# Patient Record
Sex: MALE | Race: WHITE | Employment: OTHER | URBAN - METROPOLITAN AREA
[De-identification: names, ages, dates, MRNs, and addresses within clinical notes are randomized per-mention and may not be internally consistent; named-entity substitution may affect disease eponyms.]

---

## 2018-07-22 RX ORDER — ATENOLOL 50 MG/1
TABLET ORAL
Qty: 90 TABLET | Refills: 11 | Status: SHIPPED | OUTPATIENT
Start: 2018-07-22 | End: 2019-10-12 | Stop reason: SDUPTHER

## 2018-07-22 RX ORDER — LISINOPRIL 20 MG/1
TABLET ORAL
Qty: 90 TABLET | Refills: 11 | Status: SHIPPED | OUTPATIENT
Start: 2018-07-22 | End: 2019-10-12 | Stop reason: SDUPTHER

## 2019-02-08 ENCOUNTER — HOSPITAL ENCOUNTER (OUTPATIENT)
Age: 69
Discharge: HOME | End: 2019-02-08

## 2019-02-08 PROCEDURE — 9900000038 HC CARDIAC REHAB PHASE 3 - MONTHLY

## 2019-10-13 RX ORDER — ATENOLOL 50 MG/1
TABLET ORAL
Qty: 90 TABLET | Refills: 10 | Status: SHIPPED | OUTPATIENT
Start: 2019-10-13 | End: 2020-09-21 | Stop reason: SDUPTHER

## 2019-10-13 RX ORDER — LISINOPRIL 20 MG/1
TABLET ORAL
Qty: 90 TABLET | Refills: 10 | Status: SHIPPED | OUTPATIENT
Start: 2019-10-13 | End: 2020-09-21 | Stop reason: SDUPTHER

## 2019-10-14 ENCOUNTER — OFFICE VISIT (OUTPATIENT)
Dept: INTERNAL MEDICINE CLINIC | Age: 69
End: 2019-10-14
Payer: MEDICARE

## 2019-10-14 VITALS
BODY MASS INDEX: 29.52 KG/M2 | HEART RATE: 78 BPM | WEIGHT: 230 LBS | RESPIRATION RATE: 12 BRPM | DIASTOLIC BLOOD PRESSURE: 80 MMHG | SYSTOLIC BLOOD PRESSURE: 122 MMHG | HEIGHT: 74 IN

## 2019-10-14 DIAGNOSIS — Z13.29 SCREENING FOR THYROID DISORDER: ICD-10-CM

## 2019-10-14 DIAGNOSIS — E78.5 HYPERLIPIDEMIA, UNSPECIFIED HYPERLIPIDEMIA TYPE: ICD-10-CM

## 2019-10-14 DIAGNOSIS — I10 ESSENTIAL HYPERTENSION: ICD-10-CM

## 2019-10-14 DIAGNOSIS — K63.5 POLYP OF COLON, UNSPECIFIED PART OF COLON, UNSPECIFIED TYPE: ICD-10-CM

## 2019-10-14 DIAGNOSIS — R31.9 HEMATURIA, UNSPECIFIED TYPE: Primary | ICD-10-CM

## 2019-10-14 DIAGNOSIS — Z23 NEED FOR VACCINATION WITH 13-POLYVALENT PNEUMOCOCCAL CONJUGATE VACCINE: ICD-10-CM

## 2019-10-14 DIAGNOSIS — Z00.00 MEDICARE ANNUAL WELLNESS VISIT, SUBSEQUENT: Primary | ICD-10-CM

## 2019-10-14 DIAGNOSIS — B19.20 HEPATITIS C VIRUS INFECTION WITHOUT HEPATIC COMA, UNSPECIFIED CHRONICITY: ICD-10-CM

## 2019-10-14 DIAGNOSIS — Z12.5 SPECIAL SCREENING FOR MALIGNANT NEOPLASM OF PROSTATE: ICD-10-CM

## 2019-10-14 LAB
BILIRUBIN, POC: ABNORMAL
BLOOD URINE, POC: ABNORMAL
CLARITY, POC: CLEAR
COLOR, POC: YELLOW
GLUCOSE URINE, POC: ABNORMAL
KETONES, POC: ABNORMAL
LEUKOCYTE EST, POC: ABNORMAL
NITRITE, POC: ABNORMAL
PH, POC: 5
PROTEIN, POC: ABNORMAL
SPECIFIC GRAVITY, POC: 1.01
UROBILINOGEN, POC: ABNORMAL

## 2019-10-14 PROCEDURE — 81002 URINALYSIS NONAUTO W/O SCOPE: CPT | Performed by: INTERNAL MEDICINE

## 2019-10-14 PROCEDURE — 4040F PNEUMOC VAC/ADMIN/RCVD: CPT | Performed by: INTERNAL MEDICINE

## 2019-10-14 PROCEDURE — G0439 PPPS, SUBSEQ VISIT: HCPCS | Performed by: INTERNAL MEDICINE

## 2019-10-14 PROCEDURE — 3017F COLORECTAL CA SCREEN DOC REV: CPT | Performed by: INTERNAL MEDICINE

## 2019-10-14 PROCEDURE — 1123F ACP DISCUSS/DSCN MKR DOCD: CPT | Performed by: INTERNAL MEDICINE

## 2019-10-14 PROCEDURE — G8484 FLU IMMUNIZE NO ADMIN: HCPCS | Performed by: INTERNAL MEDICINE

## 2019-10-14 PROCEDURE — G0009 ADMIN PNEUMOCOCCAL VACCINE: HCPCS | Performed by: INTERNAL MEDICINE

## 2019-10-14 PROCEDURE — 90670 PCV13 VACCINE IM: CPT | Performed by: INTERNAL MEDICINE

## 2019-10-14 PROCEDURE — 93000 ELECTROCARDIOGRAM COMPLETE: CPT | Performed by: INTERNAL MEDICINE

## 2019-10-14 ASSESSMENT — PATIENT HEALTH QUESTIONNAIRE - PHQ9
SUM OF ALL RESPONSES TO PHQ QUESTIONS 1-9: 1
SUM OF ALL RESPONSES TO PHQ QUESTIONS 1-9: 1

## 2019-10-14 ASSESSMENT — LIFESTYLE VARIABLES
HOW OFTEN DURING THE LAST YEAR HAVE YOU FAILED TO DO WHAT WAS NORMALLY EXPECTED FROM YOU BECAUSE OF DRINKING: 0
HOW OFTEN DURING THE LAST YEAR HAVE YOU FOUND THAT YOU WERE NOT ABLE TO STOP DRINKING ONCE YOU HAD STARTED: 0
HOW OFTEN DO YOU HAVE A DRINK CONTAINING ALCOHOL: 4
AUDIT-C TOTAL SCORE: 7
HOW OFTEN DURING THE LAST YEAR HAVE YOU BEEN UNABLE TO REMEMBER WHAT HAPPENED THE NIGHT BEFORE BECAUSE YOU HAD BEEN DRINKING: 0
HAVE YOU OR SOMEONE ELSE BEEN INJURED AS A RESULT OF YOUR DRINKING: 0
HOW MANY STANDARD DRINKS CONTAINING ALCOHOL DO YOU HAVE ON A TYPICAL DAY: 1
HOW OFTEN DO YOU HAVE SIX OR MORE DRINKS ON ONE OCCASION: 2
AUDIT TOTAL SCORE: 7
HOW OFTEN DURING THE LAST YEAR HAVE YOU HAD A FEELING OF GUILT OR REMORSE AFTER DRINKING: 0
HAS A RELATIVE, FRIEND, DOCTOR, OR ANOTHER HEALTH PROFESSIONAL EXPRESSED CONCERN ABOUT YOUR DRINKING OR SUGGESTED YOU CUT DOWN: 0
HOW OFTEN DURING THE LAST YEAR HAVE YOU NEEDED AN ALCOHOLIC DRINK FIRST THING IN THE MORNING TO GET YOURSELF GOING AFTER A NIGHT OF HEAVY DRINKING: 0

## 2019-10-16 LAB — URINE CULTURE, ROUTINE: NORMAL

## 2019-10-29 DIAGNOSIS — R31.9 HEMATURIA, UNSPECIFIED TYPE: Primary | ICD-10-CM

## 2019-10-29 LAB
BILIRUBIN, POC: ABNORMAL
BLOOD URINE, POC: ABNORMAL
CLARITY, POC: CLEAR
COLOR, POC: YELLOW
GLUCOSE URINE, POC: ABNORMAL
KETONES, POC: ABNORMAL
LEUKOCYTE EST, POC: ABNORMAL
NITRITE, POC: ABNORMAL
PH, POC: 6.5
PROTEIN, POC: ABNORMAL
SPECIFIC GRAVITY, POC: 1.01
UROBILINOGEN, POC: ABNORMAL

## 2019-10-29 PROCEDURE — 81002 URINALYSIS NONAUTO W/O SCOPE: CPT | Performed by: INTERNAL MEDICINE

## 2019-11-18 LAB — PROSTATE SPECIFIC ANTIGEN: 0.5 NG/ML

## 2020-01-27 ENCOUNTER — TELEPHONE (OUTPATIENT)
Dept: INTERNAL MEDICINE CLINIC | Age: 70
End: 2020-01-27

## 2020-01-27 NOTE — TELEPHONE ENCOUNTER
Over the weekend he stood up and there was a popping sound in his leg   She noticed broken blood vessels and soreness   Went to urgent care   They said maybe a pulled muscle but to call PCP to get an ultrasound   It is his inside left leg near knee

## 2020-09-21 RX ORDER — LISINOPRIL 20 MG/1
TABLET ORAL
Qty: 90 TABLET | Refills: 10 | Status: SHIPPED | OUTPATIENT
Start: 2020-09-21 | End: 2020-09-28

## 2020-09-21 RX ORDER — ATENOLOL 50 MG/1
TABLET ORAL
Qty: 90 TABLET | Refills: 3 | Status: SHIPPED | OUTPATIENT
Start: 2020-09-21 | End: 2020-09-28 | Stop reason: SDUPTHER

## 2020-09-28 ENCOUNTER — OFFICE VISIT (OUTPATIENT)
Dept: PRIMARY CARE CLINIC | Age: 70
End: 2020-09-28
Payer: MEDICARE

## 2020-09-28 VITALS
SYSTOLIC BLOOD PRESSURE: 126 MMHG | TEMPERATURE: 97.4 F | HEART RATE: 78 BPM | OXYGEN SATURATION: 98 % | HEIGHT: 74 IN | BODY MASS INDEX: 30.56 KG/M2 | WEIGHT: 238.13 LBS | DIASTOLIC BLOOD PRESSURE: 81 MMHG

## 2020-09-28 PROBLEM — F17.210 CIGARETTE NICOTINE DEPENDENCE WITHOUT COMPLICATION: Status: ACTIVE | Noted: 2020-09-28

## 2020-09-28 PROCEDURE — 99406 BEHAV CHNG SMOKING 3-10 MIN: CPT | Performed by: STUDENT IN AN ORGANIZED HEALTH CARE EDUCATION/TRAINING PROGRAM

## 2020-09-28 PROCEDURE — G0438 PPPS, INITIAL VISIT: HCPCS | Performed by: STUDENT IN AN ORGANIZED HEALTH CARE EDUCATION/TRAINING PROGRAM

## 2020-09-28 RX ORDER — ATENOLOL 50 MG/1
TABLET ORAL
Qty: 90 TABLET | Refills: 3 | Status: SHIPPED | OUTPATIENT
Start: 2020-09-28 | End: 2021-09-16

## 2020-09-28 RX ORDER — VARENICLINE TARTRATE 1 MG/1
1 TABLET, FILM COATED ORAL 2 TIMES DAILY
Qty: 60 TABLET | Refills: 3 | Status: SHIPPED | OUTPATIENT
Start: 2020-09-28 | End: 2021-09-28 | Stop reason: ALTCHOICE

## 2020-09-28 RX ORDER — LISINOPRIL 20 MG/1
20 TABLET ORAL DAILY
Qty: 90 TABLET | Refills: 3 | Status: SHIPPED | OUTPATIENT
Start: 2020-09-28 | End: 2021-09-27

## 2020-09-28 RX ORDER — VARENICLINE TARTRATE
KIT
Qty: 42 TABLET | Refills: 0 | Status: SHIPPED | OUTPATIENT
Start: 2020-09-28 | End: 2021-06-23

## 2020-09-28 ASSESSMENT — PATIENT HEALTH QUESTIONNAIRE - PHQ9
10. IF YOU CHECKED OFF ANY PROBLEMS, HOW DIFFICULT HAVE THESE PROBLEMS MADE IT FOR YOU TO DO YOUR WORK, TAKE CARE OF THINGS AT HOME, OR GET ALONG WITH OTHER PEOPLE: 0
8. MOVING OR SPEAKING SO SLOWLY THAT OTHER PEOPLE COULD HAVE NOTICED. OR THE OPPOSITE, BEING SO FIGETY OR RESTLESS THAT YOU HAVE BEEN MOVING AROUND A LOT MORE THAN USUAL: 0
4. FEELING TIRED OR HAVING LITTLE ENERGY: 1
1. LITTLE INTEREST OR PLEASURE IN DOING THINGS: 3
3. TROUBLE FALLING OR STAYING ASLEEP: 3
7. TROUBLE CONCENTRATING ON THINGS, SUCH AS READING THE NEWSPAPER OR WATCHING TELEVISION: 0
5. POOR APPETITE OR OVEREATING: 0
6. FEELING BAD ABOUT YOURSELF - OR THAT YOU ARE A FAILURE OR HAVE LET YOURSELF OR YOUR FAMILY DOWN: 0
SUM OF ALL RESPONSES TO PHQ QUESTIONS 1-9: 7
9. THOUGHTS THAT YOU WOULD BE BETTER OFF DEAD, OR OF HURTING YOURSELF: 0
SUM OF ALL RESPONSES TO PHQ QUESTIONS 1-9: 7

## 2020-09-28 ASSESSMENT — ENCOUNTER SYMPTOMS
COUGH: 0
SHORTNESS OF BREATH: 0
BACK PAIN: 0
BLOOD IN STOOL: 0

## 2020-09-28 NOTE — PROGRESS NOTES
2020    Bessie Resendez (:  1950) is a 72 y.o. male, here for evaluation of the following medical concerns:    HPI    Well Adult Physical: Patient here for a comprehensive physical exam.The patient reports problems - knee pain  Do you take any herbs or supplements that were not prescribed by a doctor? no Are you taking calcium supplements? no Are you taking aspirin daily? no    Sexual activity: single partner, contraception - none   Diet: Unhealthy  Exercise: no regular exercise  Seatbelt use: yes    Review of Systems   Constitutional: Negative for activity change, fatigue and unexpected weight change. HENT: Negative for hearing loss. Eyes: Negative for visual disturbance. Respiratory: Negative for cough and shortness of breath. Cardiovascular: Negative for chest pain and leg swelling. Gastrointestinal: Negative for blood in stool. Endocrine: Negative for polydipsia and polyphagia. Genitourinary: Negative for dysuria and frequency. Musculoskeletal: Positive for arthralgias (Bilateral knee pain; known osteoarthritis. Previous primary care doctor was holding him off as long as possible. ). Negative for back pain and gait problem. Skin: Negative for rash. Allergic/Immunologic: Negative for environmental allergies. Neurological: Negative for dizziness and headaches. Hematological: Does not bruise/bleed easily. Psychiatric/Behavioral: Negative for dysphoric mood and sleep disturbance. The patient is not nervous/anxious. Prior to Visit Medications    Medication Sig Taking? Authorizing Provider   varenicline (CHANTIX STARTING MONTH LEATHA) 0.5 MG X 11 & 1 MG X 42 tablet Take by mouth.  Yes Gretta Payne DO   varenicline (CHANTIX CONTINUING MONTH LEATHA) 1 MG tablet Take 1 tablet by mouth 2 times daily Yes Gretta Payne DO   atenolol (TENORMIN) 50 MG tablet TAKE ONE TABLET BY MOUTH DAILY Yes Gretta Payne, DO   lisinopril (PRINIVIL;ZESTRIL) 20 MG tablet Take 1 tablet by mouth daily Yes Myron Davies, DO        No Known Allergies    Past Medical History:   Diagnosis Date    Hepatitis C     History of meniscal tear     Right knee     Hyperlipidemia     Hypertension     Impaired fasting glucose        Past Surgical History:   Procedure Laterality Date    COLONOSCOPY  Aug., 2014 ( 2017 )    Dr. Aaron Mckeon - tubular adenomas ( 3 )    COLONOSCOPY  *Nov.26, 2019 ( 2024 )    Dr. Dani Yee - tubular adenoma       Social History     Socioeconomic History    Marital status:      Spouse name: Not on file    Number of children: 0    Years of education: Not on file    Highest education level: Not on file   Occupational History    Occupation: Artist    Social Needs    Financial resource strain: Not on file    Food insecurity     Worry: Not on file     Inability: Not on file   [employer] needs     Medical: Not on file     Non-medical: Not on file   Tobacco Use    Smoking status: Current Every Day Smoker     Packs/day: 1.00     Years: 30.00     Pack years: 30.00    Smokeless tobacco: Never Used   Substance and Sexual Activity    Alcohol use: Yes     Alcohol/week: 0.0 standard drinks     Comment: 35 beers per week.      Drug use: Not on file    Sexual activity: Not Currently   Lifestyle    Physical activity     Days per week: Not on file     Minutes per session: Not on file    Stress: Not on file   Relationships    Social connections     Talks on phone: Not on file     Gets together: Not on file     Attends Oriental orthodox service: Not on file     Active member of club or organization: Not on file     Attends meetings of clubs or organizations: Not on file     Relationship status: Not on file    Intimate partner violence     Fear of current or ex partner: Not on file     Emotionally abused: Not on file     Physically abused: Not on file     Forced sexual activity: Not on file   Other Topics Concern    Not on file   Social History Narrative    Living Will:  No                 Family History Problem Relation Age of Onset    Other Father 80        , pneumonia.  Other Mother         Alive, 81, in good health. Vitals:    20 1020 20 1118   BP: (!) 150/103 126/81   Pulse: 78    Temp: 97.4 °F (36.3 °C)    TempSrc: Oral    SpO2: 98%    Weight: 238 lb 2 oz (108 kg)    Height: 6' 2\" (1.88 m)      Estimated body mass index is 30.57 kg/m² as calculated from the following:    Height as of this encounter: 6' 2\" (1.88 m). Weight as of this encounter: 238 lb 2 oz (108 kg). Physical Exam  Vitals signs reviewed. Constitutional:       Appearance: Normal appearance. HENT:      Head: Normocephalic and atraumatic. Nose: Nose normal.      Mouth/Throat:      Mouth: Mucous membranes are moist.      Pharynx: Oropharynx is clear. Eyes:      Extraocular Movements: Extraocular movements intact. Conjunctiva/sclera: Conjunctivae normal.   Neck:      Musculoskeletal: Normal range of motion and neck supple. Cardiovascular:      Rate and Rhythm: Normal rate and regular rhythm. Pulses: Normal pulses. Heart sounds: Normal heart sounds. Pulmonary:      Effort: Pulmonary effort is normal.      Breath sounds: Normal breath sounds. Abdominal:      General: Abdomen is flat. Bowel sounds are normal.      Palpations: Abdomen is soft. Musculoskeletal: Normal range of motion. Skin:     General: Skin is warm and dry. Capillary Refill: Capillary refill takes less than 2 seconds. Findings: No rash. Neurological:      General: No focal deficit present. Mental Status: He is alert and oriented to person, place, and time. Mental status is at baseline. Sensory: No sensory deficit. Psychiatric:         Mood and Affect: Mood normal.         Behavior: Behavior normal.         Thought Content:  Thought content normal.         Judgment: Judgment normal.       Separate Identifiable issues addressed today:      ASSESSMENT/PLAN:   [facility] was seen today for established new doctor, nicotine dependence and other. Diagnoses and all orders for this visit:    Encounter for medical examination to establish care: Counseled on healthy diet and exercise regimen. Agrees he could improve upon his. Essential hypertension: BP at goal today. Continue current regimen.  -     COMPREHENSIVE METABOLIC PANEL; Future  -     atenolol (TENORMIN) 50 MG tablet; TAKE ONE TABLET BY MOUTH DAILY  -     lisinopril (PRINIVIL;ZESTRIL) 20 MG tablet; Take 1 tablet by mouth daily    Pure hypercholesterolemia: No recent lipid panel. Ordered. -     Lipid, Fasting; Future    Nicotine dependence, cigarettes, uncomplicated: Previously succeeded in quitting with Chantix, but resumed after prescription ran out. Currently smoking 1 pack/day. Interested in stopping. Will start Chantix again today. Greater than 3 minutes spent counseling.  -     CT Lung Screen (Initial or Annual); Future  -     CO TOBACCO USE CESSATION INTERMEDIATE 3-10 MINUTES  -     varenicline (CHANTIX STARTING MONTH PAK) 0.5 MG X 11 & 1 MG X 42 tablet; Take by mouth.  -     varenicline (CHANTIX CONTINUING MONTH PAK) 1 MG tablet; Take 1 tablet by mouth 2 times daily    Class 1 obesity due to excess calories with serious comorbidity and body mass index (BMI) of 30.0 to 30.9 in adult: Counseled on appropriate lifestyle modifications. Encounter for screening for malignant neoplasm of lung: Longstanding smoker. Currently asymptomatic. Appropriate for screening CT. Ordered. -     CT Lung Screen (Initial or Annual); Future    Need for immunization against influenza: Declined    Primary osteoarthritis of both knees: Previously diagnosed with osteoarthritis of both knees by his previous PCP. At his recommendation he has been holding off on knee replacement surgery, but he is now ready to have this done. Referral placed.   -     Anaid Ryan MD, Orthopedic Surgery (Sports,Knee), [place]    Return in about 4 weeks (around 10/26/2020) for Annual Wellness. An  electronic signature was used to authenticate this note.     --Logan Schwab, DO on 9/28/2020 at 11:18 AM

## 2020-09-28 NOTE — PROGRESS NOTES
Pt is concerned about his knees and feet hurting him. Wants to possibly have referrals,   Pt is asking about a marijuana card, has complaints about chronic pain.

## 2020-09-28 NOTE — PATIENT INSTRUCTIONS
pressure checked during a routine doctor visit. Your doctor will tell you how often to check your blood pressure based on your age, your blood pressure results, and other factors. · Diabetes. Ask your doctor whether you should have tests for diabetes. · Vision. Experts recommend that you have yearly exams for glaucoma and other age-related eye problems. · Hearing. Tell your doctor if you notice any change in your hearing. You can have tests to find out how well you hear. · Colon cancer tests. Keep having colon cancer tests as your doctor recommends. You can have one of several types of tests. · Heart attack and stroke risk. At least every 4 to 6 years, you should have your risk for heart attack and stroke assessed. Your doctor uses factors such as your age, blood pressure, cholesterol, and whether you smoke or have diabetes to show what your risk for a heart attack or stroke is over the next 10 years. · Osteoporosis. Talk to your doctor about whether you should have a bone density test to find out whether you have thinning bones. Ask your doctor if you need to take a calcium plus vitamin D supplement. You may be able to get enough calcium and vitamin D through your diet. For women  · Pap test and pelvic exam. You may no longer need a Pap test. Talk with your doctor about whether to stop or continue to have Pap tests. · Breast exam and mammogram. Ask how often you should have a mammogram, which is an X-ray of your breasts. A mammogram can spot breast cancer before it can be felt and when it is easiest to treat. · Thyroid disease. Talk to your doctor about whether to have your thyroid checked as part of a regular physical exam. Women have an increased chance of a thyroid problem. For men  · Prostate exam. Talk to your doctor about whether you should have a blood test (called a PSA test) for prostate cancer.  Experts recommend that you discuss the benefits and risks of the test with your doctor before you decide whether to have this test. Some experts say that men ages 79 and older no longer need testing. · Abdominal aortic aneurysm. Ask your doctor whether you should have a test to check for an aneurysm. You may need a test if you ever smoked or if your parent, brother, sister, or child has had an aneurysm. When should you call for help? Watch closely for changes in your health, and be sure to contact your doctor if you have any problems or symptoms that concern you. Where can you learn more? Go to https://[employer].Jule Game. org and sign in to your [employer] account. Enter I827 in the [employer] box to learn more about \"Well Visit, Over 65: Care Instructions. \"     If you do not have an account, please click on the \"Sign Up Now\" link. Current as of: August 22, 2019               Content Version: 12.5  © 7798-9120 Healthwise, Incorporated. Care instructions adapted under license by [facility] [address] If you have questions about a medical condition or this instruction, always ask your healthcare professional. [address] any warranty or liability for your use of this information.

## 2020-09-29 ENCOUNTER — TELEPHONE (OUTPATIENT)
Dept: PRIMARY CARE CLINIC | Age: 70
End: 2020-09-29

## 2020-09-30 NOTE — TELEPHONE ENCOUNTER
PA submitted via CMM for Chantix Starting Month Devendra 0.5 MG X 11 &1 MG X 42 tablets.   Alex CHOWDHURY Case ID: [id number] - Rx #: [id number]    STATUS: PENDING

## 2020-10-01 NOTE — TELEPHONE ENCOUNTER
Chantix Starting Month Devendra 0.5 MG X 11 &1 MG X 42 tablets  Approved on September 30   Your request has been approved

## 2021-06-14 PROBLEM — E66.09 CLASS 1 OBESITY DUE TO EXCESS CALORIES WITH SERIOUS COMORBIDITY AND BODY MASS INDEX (BMI) OF 30.0 TO 30.9 IN ADULT: Status: ACTIVE | Noted: 2021-06-14

## 2021-06-14 PROBLEM — E66.811 CLASS 1 OBESITY DUE TO EXCESS CALORIES WITH SERIOUS COMORBIDITY AND BODY MASS INDEX (BMI) OF 30.0 TO 30.9 IN ADULT: Status: ACTIVE | Noted: 2021-06-14

## 2021-06-14 NOTE — PATIENT INSTRUCTIONS
Patient Education        Learning About the Mediterranean Diet  What is the [address]? The Mediterranean diet is a style of eating rather than a diet plan. It features foods eaten in [place], [place], [place] and [place], and other countries along the [facility]. It emphasizes eating foods like fish, fruits, vegetables, beans, high-fiber breads and whole grains, nuts, and olive oil. This style of eating includes limited red meat, cheese, and sweets. Why choose the Mediterranean diet? A Mediterranean-style diet may improve heart health. It contains more fat than other heart-healthy diets. But the fats are mainly from nuts, unsaturated oils (such as fish oils and olive oil), and certain nut or seed oils (such as canola, soybean, or flaxseed oil). These fats may help protect the heart and blood vessels. How can you get started on the Mediterranean diet? Here are some things you can do to switch to a more Mediterranean way of eating. What to eat  · Eat a variety of fruits and vegetables each day, such as grapes, blueberries, tomatoes, broccoli, peppers, figs, olives, spinach, eggplant, beans, lentils, and chickpeas. · Eat a variety of whole-grain foods each day, such as oats, brown rice, and whole wheat bread, pasta, and couscous. · Eat fish at least 2 times a week. Try tuna, salmon, mackerel, lake trout, herring, or sardines. · Eat moderate amounts of low-fat dairy products, such as milk, cheese, or yogurt. · Eat moderate amounts of poultry and eggs. · Choose healthy (unsaturated) fats, such as nuts, olive oil, and certain nut or seed oils like canola, soybean, and flaxseed. · Limit unhealthy (saturated) fats, such as butter, palm oil, and coconut oil. And limit fats found in animal products, such as meat and dairy products made with whole milk. Try to eat red meat only a few times a month in very small amounts. · Limit sweets and desserts to only a few times a week.  This includes sugar-sweetened drinks like soda. The Mediterranean diet may also include red wine with your meal1 glass each day for women and up to 2 glasses a day for men. Tips for eating at home  · Use herbs, spices, garlic, lemon zest, and citrus juice instead of salt to add flavor to foods. · Add avocado slices to your sandwich instead of kelley. · Have fish for lunch or dinner instead of red meat. Brush the fish with olive oil, and broil or grill it. · Sprinkle your salad with seeds or nuts instead of cheese. · Cook with olive or canola oil instead of butter or oils that are high in saturated fat. · Switch from 2% milk or whole milk to 1% or fat-free milk. · Dip raw vegetables in a vinaigrette dressing or hummus instead of dips made from mayonnaise or sour cream.  · Have a piece of fruit for dessert instead of a piece of cake. Try baked apples, or have some dried fruit. Tips for eating out  · Try broiled, grilled, baked, or poached fish instead of having it fried or breaded. · Ask your  to have your meals prepared with olive oil instead of butter. · Order dishes made with marinara sauce or sauces made from olive oil. Avoid sauces made from cream or mayonnaise. · Choose whole-grain breads, whole wheat pasta and pizza crust, brown rice, beans, and lentils. · Cut back on butter or margarine on bread. Instead, you can dip your bread in a small amount of olive oil. · Ask for a side salad or grilled vegetables instead of french fries or chips. Where can you learn more? Go to https://[employer] Companyguilleeweb.[employer]aks. org and sign in to your [employer] account. Enter [phone] in the [facility] box to learn more about \"Learning About the Mediterranean Diet. \"     If you do not have an account, please click on the \"Sign Up Now\" link. Current as of: December 17, 2020               Content Version: 12.8  © 1424-6514 Healthwise, Incorporated. Care instructions adapted under license by [facility] ([facility]).  If you have questions about a medical condition or this instruction, always ask your healthcare professional. [place] [address] any warranty or liability for your use of this information.

## 2021-06-15 ENCOUNTER — OFFICE VISIT (OUTPATIENT)
Dept: PRIMARY CARE CLINIC | Age: 71
End: 2021-06-15
Payer: MEDICARE

## 2021-06-15 VITALS
HEIGHT: 74 IN | WEIGHT: 236.8 LBS | BODY MASS INDEX: 30.39 KG/M2 | HEART RATE: 70 BPM | SYSTOLIC BLOOD PRESSURE: 123 MMHG | DIASTOLIC BLOOD PRESSURE: 83 MMHG

## 2021-06-15 DIAGNOSIS — E66.09 CLASS 1 OBESITY DUE TO EXCESS CALORIES WITH SERIOUS COMORBIDITY AND BODY MASS INDEX (BMI) OF 30.0 TO 30.9 IN ADULT: ICD-10-CM

## 2021-06-15 DIAGNOSIS — R79.9 ABNORMAL FINDING OF BLOOD CHEMISTRY, UNSPECIFIED: ICD-10-CM

## 2021-06-15 DIAGNOSIS — E78.00 PURE HYPERCHOLESTEROLEMIA: ICD-10-CM

## 2021-06-15 DIAGNOSIS — Z12.5 SCREENING PSA (PROSTATE SPECIFIC ANTIGEN): ICD-10-CM

## 2021-06-15 DIAGNOSIS — M67.449 GANGLION CYST OF JOINT OF FINGER: ICD-10-CM

## 2021-06-15 DIAGNOSIS — F17.210 CIGARETTE NICOTINE DEPENDENCE WITHOUT COMPLICATION: ICD-10-CM

## 2021-06-15 DIAGNOSIS — N40.0 BENIGN PROSTATIC HYPERPLASIA WITHOUT LOWER URINARY TRACT SYMPTOMS: ICD-10-CM

## 2021-06-15 DIAGNOSIS — I10 ESSENTIAL HYPERTENSION: Primary | ICD-10-CM

## 2021-06-15 DIAGNOSIS — Z13.1 SCREENING FOR DIABETES MELLITUS: ICD-10-CM

## 2021-06-15 PROCEDURE — G8417 CALC BMI ABV UP PARAM F/U: HCPCS | Performed by: STUDENT IN AN ORGANIZED HEALTH CARE EDUCATION/TRAINING PROGRAM

## 2021-06-15 PROCEDURE — 4040F PNEUMOC VAC/ADMIN/RCVD: CPT | Performed by: STUDENT IN AN ORGANIZED HEALTH CARE EDUCATION/TRAINING PROGRAM

## 2021-06-15 PROCEDURE — 4004F PT TOBACCO SCREEN RCVD TLK: CPT | Performed by: STUDENT IN AN ORGANIZED HEALTH CARE EDUCATION/TRAINING PROGRAM

## 2021-06-15 PROCEDURE — 1123F ACP DISCUSS/DSCN MKR DOCD: CPT | Performed by: STUDENT IN AN ORGANIZED HEALTH CARE EDUCATION/TRAINING PROGRAM

## 2021-06-15 PROCEDURE — 99214 OFFICE O/P EST MOD 30 MIN: CPT | Performed by: STUDENT IN AN ORGANIZED HEALTH CARE EDUCATION/TRAINING PROGRAM

## 2021-06-15 PROCEDURE — 3017F COLORECTAL CA SCREEN DOC REV: CPT | Performed by: STUDENT IN AN ORGANIZED HEALTH CARE EDUCATION/TRAINING PROGRAM

## 2021-06-15 PROCEDURE — G8427 DOCREV CUR MEDS BY ELIG CLIN: HCPCS | Performed by: STUDENT IN AN ORGANIZED HEALTH CARE EDUCATION/TRAINING PROGRAM

## 2021-06-15 SDOH — ECONOMIC STABILITY: FOOD INSECURITY: WITHIN THE PAST 12 MONTHS, THE FOOD YOU BOUGHT JUST DIDN'T LAST AND YOU DIDN'T HAVE MONEY TO GET MORE.: NEVER TRUE

## 2021-06-15 SDOH — ECONOMIC STABILITY: FOOD INSECURITY: WITHIN THE PAST 12 MONTHS, YOU WORRIED THAT YOUR FOOD WOULD RUN OUT BEFORE YOU GOT MONEY TO BUY MORE.: NEVER TRUE

## 2021-06-15 ASSESSMENT — ENCOUNTER SYMPTOMS
NAUSEA: 0
CHEST TIGHTNESS: 0
ABDOMINAL PAIN: 0
SHORTNESS OF BREATH: 0
ABDOMINAL DISTENTION: 0
DIARRHEA: 0
COUGH: 0

## 2021-06-15 ASSESSMENT — PATIENT HEALTH QUESTIONNAIRE - PHQ9
2. FEELING DOWN, DEPRESSED OR HOPELESS: 0
SUM OF ALL RESPONSES TO PHQ QUESTIONS 1-9: 1
SUM OF ALL RESPONSES TO PHQ QUESTIONS 1-9: 1
SUM OF ALL RESPONSES TO PHQ9 QUESTIONS 1 & 2: 1
SUM OF ALL RESPONSES TO PHQ QUESTIONS 1-9: 1
1. LITTLE INTEREST OR PLEASURE IN DOING THINGS: 1

## 2021-06-15 ASSESSMENT — SOCIAL DETERMINANTS OF HEALTH (SDOH): HOW HARD IS IT FOR YOU TO PAY FOR THE VERY BASICS LIKE FOOD, HOUSING, MEDICAL CARE, AND HEATING?: NOT HARD AT ALL

## 2021-06-15 NOTE — PROGRESS NOTES
6/15/2021     Cynthia Garcia (:  1950) is a 72 y.o. male, here for evaluation of the following medical concerns:    HPI  Treatment Adherence:   Medication compliance:  compliant most of the time  Diet compliance:  compliant most of the time  Weight trend: stable  Current exercise: walks 3 time(s) per week  Barriers: none    Hypertension:  Home blood pressure monitoring: No. Patient denies chest pain, shortness of breath, headache, lightheadedness, blurred vision, peripheral edema, palpitations, dry cough and fatigue. Antihypertensive medication side effects: no medication side effects noted. Use of agents associated with hypertension: none. Sodium (mmol/L)   Date Value   2014 144    BUN (mg/dL)   Date Value   2014 13    Glucose (mg/dL)   Date Value   2014 95      Potassium (mmol/L)   Date Value   2014 4.2    CREATININE (mg/dL)   Date Value   2014 0.7 (L)         Hyperlipidemia:  No new myalgias or GI upset on atorvastatin (Lipitor). Lab Results   Component Value Date    CHOL 229 (H) 2014    TRIG 126 2014    HDL 36 (L) 2014    LDLCALC 168 (H) 2014     Lab Results   Component Value Date    ALT 62 (H) 2014    AST 73 (H) 2014        Mass left finger: Cynthia Najera presents today for evaluation of a mass of one of his left fingers. He noticed the mass several years ago, but it has been slowly increasing in size. It does not cause him much discomfort unless he strikes it on something. Review of Systems   Constitutional: Negative for activity change, appetite change, fatigue and unexpected weight change. Eyes: Negative for visual disturbance. Respiratory: Negative for cough, chest tightness and shortness of breath. Cardiovascular: Negative for chest pain, palpitations and leg swelling. Gastrointestinal: Negative for abdominal distention, abdominal pain, diarrhea and nausea.    Endocrine: Rate and Rhythm: Normal rate and regular rhythm. Pulses: Normal pulses. Heart sounds: Normal heart sounds. Pulmonary:      Effort: Pulmonary effort is normal.      Breath sounds: Normal breath sounds. Abdominal:      General: Abdomen is flat. Bowel sounds are normal.      Palpations: Abdomen is soft. Musculoskeletal:         General: Deformity (Ganglion cyst of PIP joint) present. Normal range of motion. Cervical back: Normal range of motion and neck supple. Skin:     General: Skin is warm and dry. Capillary Refill: Capillary refill takes less than 2 seconds. Findings: No rash. Neurological:      General: No focal deficit present. Mental Status: He is alert and oriented to person, place, and time. Mental status is at baseline. Psychiatric:         Mood and Affect: Mood normal.         ASSESSMENT/PLAN:  1. Essential hypertension: Blood pressure at goal today. Updating CMP. Tolerating current regimen well without side effects. Refills given on medications. - Comprehensive Metabolic Panel; Future    2. Pure hypercholesterolemia: Most recent lipid panel July 2014. . Updating today. - Lipid, Fasting; Future    3. Ganglion cyst of joint of finger: Irritating ganglion cyst of left PIP joint. Referral to Dr. Janey Cherry for evaluation.  - Ignacio Mckee MD, Hand Surgery (Hand, Wrist, Upper Extremity), [facility]    4. Cigarette nicotine dependence without complication: Chantix too expensive. Patient continues to try to wean cigarettes on his own. - CT Lung Screen (Initial or Annual); Future    5. Class 1 obesity due to excess calories with serious comorbidity and body mass index (BMI) of 30.0 to 68.1 in adult  Complicates all aspects of patient's care. 6. Screening for diabetes mellitus  - Hemoglobin A1C; Future    7. Screening PSA (prostate specific antigen)  - PSA, Prostatic Specific Antigen;  Future    Return in about 6 months (around 12/15/2021) for Annual Wellness. An electronic signature was used to authenticate this note.     --Jaycob Whelan,  on 6/15/2021 at 2:06 PM

## 2021-06-22 DIAGNOSIS — F17.210 NICOTINE DEPENDENCE, CIGARETTES, UNCOMPLICATED: ICD-10-CM

## 2021-06-22 NOTE — TELEPHONE ENCOUNTER
Medication:   Requested Prescriptions     Pending Prescriptions Disp Refills    varenicline (CHANTIX STARTING MONTH LEATHA) 0.5 MG X 11 & 1 MG X 42 tablet [Pharmacy Med Name: [address] 1 box 0     Sig: TAKE 1 (0.5MG) TAB BY MOUTH DAILY FOR 3 DAYS, THEN TAKE 1 (0.5MG) TAB TWICE DAILY FOR 4 DAYS, THEN TAKE 1 (1MG) TAB TWICE DAILY THEREAFTER        Last Filled:  09/28/2020    Patient Phone Number: [phone] (home)     Last appt: 6/15/2021 Return in about 6 months (around 12/15/2021) for Annual Wellness  Next appt: Visit date not found    Last OARRS: No flowsheet data found.

## 2021-06-23 RX ORDER — VARENICLINE TARTRATE
KIT
Qty: 1 BOX | Refills: 0 | Status: SHIPPED | OUTPATIENT
Start: 2021-06-23 | End: 2021-09-28 | Stop reason: ALTCHOICE

## 2021-06-24 ENCOUNTER — TELEPHONE (OUTPATIENT)
Dept: PRIMARY CARE CLINIC | Age: 71
End: 2021-06-24

## 2021-06-24 NOTE — TELEPHONE ENCOUNTER
PA submitted via CMM for Chantix Starting Month Devendra 0.5 MG X 11 &1 MG X 42 tablets.   Key: [id number] - PA Case ID: [id number]    STATUS: PENDING

## 2021-06-24 NOTE — TELEPHONE ENCOUNTER
varenicline (CHANTIX STARTING MONTH PAK) 0.5 MG X 11 & 1 MG X 42 tablet [[id number]]   Order Details   Dose, Route, Frequency: As Directed   Dispense Quantity: 1 box Refills: 0         Sig: TAKE 1 (0.5MG) TAB BY MOUTH DAILY FOR 3 DAYS, THEN TAKE 1 (0.5MG) TAB TWICE DAILY FOR 4 DAYS, THEN TAKE 1 (1MG) TAB TWICE DAILY THEREAFTER        Start Date: 06/23/21 End Date: --   Written Date: 06/23/21 Expiration Date: 06/23/22      Diagnosis Association: Nicotine dependence, cigarettes, uncomplicated    Original Order: varenicline (CHANTIX STARTING MONTH LEATHA) 0.5 MG X 11 & 1 MG X 42 tablet [[id number]]   Providers  Authorizing Provider: Maribeth Macias DO NPI: [id number]   Ordering User: Maribeth Macias DO        Pharmacy  [address], [address] [place] [id number] [facility] [phone]   [place] [id number]   Phone: [phone] Fax: [phone]

## 2021-06-25 NOTE — TELEPHONE ENCOUNTER
Received APPROVAL for Chantix. Medication has been approved through 12/21/2021. See approval letter attached. Please notify patient. Thank you.

## 2021-07-06 ENCOUNTER — TELEPHONE (OUTPATIENT)
Dept: PRIMARY CARE CLINIC | Age: 71
End: 2021-07-06

## 2021-07-06 DIAGNOSIS — G89.29 CHRONIC LOW BACK PAIN, UNSPECIFIED BACK PAIN LATERALITY, UNSPECIFIED WHETHER SCIATICA PRESENT: Primary | ICD-10-CM

## 2021-07-06 DIAGNOSIS — M54.50 CHRONIC LOW BACK PAIN, UNSPECIFIED BACK PAIN LATERALITY, UNSPECIFIED WHETHER SCIATICA PRESENT: Primary | ICD-10-CM

## 2021-07-06 NOTE — TELEPHONE ENCOUNTER
----- Message from Jenifer Cruz sent at 7/2/2021  3:41 PM EDT -----  Subject: Message to Provider    QUESTIONS  Information for Provider? Pt says Dr. Maria T Krishnamurthy mentioned a pain management   clinic in [facility] and he would like Dr. Maria T Krishnamurthy to give him a call or text   to get the number and address. ---------------------------------------------------------------------------  --------------  [place] READ  What is the best way for the office to contact you? OK to leave message on   voicemail  Preferred Call Back Phone Number? [phone]  ---------------------------------------------------------------------------  --------------  SCRIPT ANSWERS  Relationship to Patient?  Self

## 2021-07-06 NOTE — TELEPHONE ENCOUNTER
Can we call and clarify what he wants the referral for and I can place it. I just cannot remember which body part he has chronic pain.

## 2021-09-16 DIAGNOSIS — I10 ESSENTIAL HYPERTENSION: ICD-10-CM

## 2021-09-16 RX ORDER — ATENOLOL 100 MG/1
100 TABLET ORAL DAILY
Qty: 90 TABLET | Refills: 1 | Status: SHIPPED | OUTPATIENT
Start: 2021-09-16 | End: 2021-09-21

## 2021-09-16 NOTE — TELEPHONE ENCOUNTER
Medication:   Requested Prescriptions     Pending Prescriptions Disp Refills    atenolol (TENORMIN) 100 MG tablet [Pharmacy Med Name: ATENOLOL 100 MG TABLET] 45 tablet      Sig: TAKE 1/2 TABLET BY MOUTH DAILY        Last Filled:  09/28/2020    Patient Phone Number: [phone] (home)     Last appt: 6/15/2021 Return in about 6 months (around 12/15/2021) for Annual Wellness    Next appt: Visit date not found    Last OARRS: No flowsheet data found.

## 2021-09-21 DIAGNOSIS — I10 ESSENTIAL HYPERTENSION: ICD-10-CM

## 2021-09-21 RX ORDER — ATENOLOL 100 MG/1
TABLET ORAL
Qty: 45 TABLET | Refills: 1 | Status: SHIPPED | OUTPATIENT
Start: 2021-09-21 | End: 2021-09-28 | Stop reason: ALTCHOICE

## 2021-09-21 NOTE — TELEPHONE ENCOUNTER
Medication:   Requested Prescriptions     Pending Prescriptions Disp Refills    atenolol (TENORMIN) 100 MG tablet [Pharmacy Med Name: ATENOLOL 100 MG TABLET] 45 tablet      Sig: TAKE 1/2 TABLET BY MOUTH DAILY        Last Filled:  09/16/21    Patient Phone Number: [phone] (home)     Last appt: 6/15/2021 Return in about 6 months (around 12/15/2021) for Annual Wellness. Next appt: Visit date not found    Last OARRS: No flowsheet data found.

## 2021-09-24 DIAGNOSIS — I10 ESSENTIAL HYPERTENSION: ICD-10-CM

## 2021-09-27 RX ORDER — LISINOPRIL 20 MG/1
TABLET ORAL
Qty: 90 TABLET | Refills: 3 | Status: SHIPPED | OUTPATIENT
Start: 2021-09-27 | End: 2022-09-26

## 2021-09-28 ENCOUNTER — OFFICE VISIT (OUTPATIENT)
Dept: INTERNAL MEDICINE CLINIC | Age: 71
End: 2021-09-28
Payer: MEDICARE

## 2021-09-28 VITALS
BODY MASS INDEX: 29.26 KG/M2 | DIASTOLIC BLOOD PRESSURE: 80 MMHG | TEMPERATURE: 98 F | SYSTOLIC BLOOD PRESSURE: 140 MMHG | RESPIRATION RATE: 14 BRPM | HEIGHT: 74 IN | HEART RATE: 91 BPM | WEIGHT: 228 LBS | OXYGEN SATURATION: 98 %

## 2021-09-28 DIAGNOSIS — B18.2 CHRONIC HEPATITIS C WITHOUT HEPATIC COMA (HCC): ICD-10-CM

## 2021-09-28 DIAGNOSIS — F17.210 CIGARETTE NICOTINE DEPENDENCE WITHOUT COMPLICATION: ICD-10-CM

## 2021-09-28 DIAGNOSIS — E78.00 PURE HYPERCHOLESTEROLEMIA: ICD-10-CM

## 2021-09-28 DIAGNOSIS — E66.09 CLASS 1 OBESITY DUE TO EXCESS CALORIES WITH SERIOUS COMORBIDITY AND BODY MASS INDEX (BMI) OF 30.0 TO 30.9 IN ADULT: ICD-10-CM

## 2021-09-28 DIAGNOSIS — I10 ESSENTIAL HYPERTENSION: Primary | ICD-10-CM

## 2021-09-28 DIAGNOSIS — N40.1 BENIGN PROSTATIC HYPERPLASIA (BPH) WITH URINARY URGENCY: ICD-10-CM

## 2021-09-28 DIAGNOSIS — R39.15 BENIGN PROSTATIC HYPERPLASIA (BPH) WITH URINARY URGENCY: ICD-10-CM

## 2021-09-28 DIAGNOSIS — T14.8XXA BRUISING: ICD-10-CM

## 2021-09-28 PROCEDURE — G0439 PPPS, SUBSEQ VISIT: HCPCS | Performed by: INTERNAL MEDICINE

## 2021-09-28 RX ORDER — ATENOLOL 100 MG/1
100 TABLET ORAL DAILY
COMMUNITY
End: 2022-06-27

## 2021-09-28 ASSESSMENT — PATIENT HEALTH QUESTIONNAIRE - PHQ9
SUM OF ALL RESPONSES TO PHQ QUESTIONS 1-9: 0
2. FEELING DOWN, DEPRESSED OR HOPELESS: 0
SUM OF ALL RESPONSES TO PHQ QUESTIONS 1-9: 0
SUM OF ALL RESPONSES TO PHQ QUESTIONS 1-9: 0
1. LITTLE INTEREST OR PLEASURE IN DOING THINGS: 0
SUM OF ALL RESPONSES TO PHQ9 QUESTIONS 1 & 2: 0

## 2021-09-28 ASSESSMENT — ENCOUNTER SYMPTOMS
SINUS PRESSURE: 0
RHINORRHEA: 0
CHEST TIGHTNESS: 0
ROS SKIN COMMENTS: BRUISING
WHEEZING: 0
SINUS PAIN: 0
NAUSEA: 0
VOMITING: 0
EYE DISCHARGE: 0
SORE THROAT: 0
COUGH: 0
TROUBLE SWALLOWING: 0
SHORTNESS OF BREATH: 0
EYE PAIN: 0
ABDOMINAL PAIN: 0
BLOOD IN STOOL: 0

## 2021-09-28 NOTE — PROGRESS NOTES
2021    Gabby Lerner (: 1950) is a 80 y.o. male, here for evaluation of the following medical concerns:    Chief Complaint   Patient presents with    Establish Care       Hypertension    Watching low-sodium diet. Lisinopril not taken today thus her blood pressure is high usually is normal  Taking blood pressure medications regularly. Blood pressure checked off and on and trying to keep a goal of blood pressure less than 130/85 most of the time. Denies any chest pain / palpitation / shortness of breath / lightheadedness etc.   The available labs reviewed and analyzed and independent interpretation of the results explained at length. Lab Results       Component                Value               Date                       NA                       144                 2014                 K                        4.2                 2014                 CL                       102                 2014                 CO2                      26                  2014                 BUN                      13                  2014                 CREATININE               0.7                 2014                 GLUCOSE                  95                  2014                 CALCIUM                  9.4                 2014                Hyperlipidemia    he has not been watching low fat diet. As he says nobody else told him he had high cholesterol so he has not been following any diet. Reminded to keep doing regular exercise 3 to 4 days a week. Must keep trying to lose weight. The available labs reviewed and analyzed and independent interpretation of the results explained at length.   Lab Results       Component                Value               Date                       CHOL                     229 (H)             2014                 TRIG                     126                 2014                 HDL 36 (L)              07/01/2014                 ALT                      62 (H)              07/01/2014                 AST                      73 (H)              07/01/2014           He had colonoscopy in 2019 and had polyps    He had taken medication for hepatitis C in the past.   But he has not kept up with the liver specialist follow-up so he does need to see him again. Bruising forearms we need to check CBC PT PTT. BPH with symptoms. Smoking    The [address] for Disease Control and Prevention states:    Tobacco use harms every organ of the body which leads to disease and disability. Tobacco use causes cancer, heart disease, stroke, and lung diseases (including emphysema, bronchitis, and chronic airway obstruction). strongly encouraged  to quit using tobacco.   You can decrease your cigarette use by half every 1-2 weeks to quit smoking in 4-8 weeks or so. You can use off and on nicotine gum or lozenges to help quit smoking. Review of Systems   Constitutional: Negative for appetite change, chills, fever and unexpected weight change. HENT: Negative for congestion, ear discharge, ear pain, nosebleeds, rhinorrhea, sinus pressure, sinus pain, sore throat and trouble swallowing. Eyes: Negative for pain and discharge. Respiratory: Negative for cough, chest tightness, shortness of breath and wheezing. Cardiovascular: Negative for chest pain, palpitations and leg swelling. Gastrointestinal: Negative for abdominal pain, blood in stool, nausea and vomiting. Endocrine: Negative for polydipsia and polyphagia. Genitourinary: Negative for difficulty urinating, enuresis, flank pain and hematuria. Musculoskeletal: Negative for myalgias. Skin: Negative for rash. Bruising   Neurological: Negative for facial asymmetry, weakness, light-headedness, numbness and headaches. Psychiatric/Behavioral: Negative for confusion.        Current Outpatient Medications on File Prior to Visit   Medication Sig Dispense Refill    atenolol (TENORMIN) 100 MG tablet Take 100 mg by mouth daily      lisinopril (PRINIVIL;ZESTRIL) 20 MG tablet TAKE ONE TABLET BY MOUTH DAILY 90 tablet 3     No current facility-administered medications on file prior to visit. No Known Allergies  Past Medical History:   Diagnosis Date    Hepatitis C     History of meniscal tear     Right knee     Hyperlipidemia     Hypertension     Impaired fasting glucose      Past Surgical History:   Procedure Laterality Date    COLONOSCOPY  Aug., 2014 (  )    Dr. Vyas Hams - tubular adenomas ( 3 )    COLONOSCOPY  *2019 (  )    Dr. Elizondo Shown - tubular adenoma      Social History     Tobacco Use    Smoking status: Current Every Day Smoker     Packs/day: 1.00     Years: 30.00     Pack years: 30.00    Smokeless tobacco: Never Used   Substance Use Topics    Alcohol use: Yes     Alcohol/week: 0.0 standard drinks     Comment: 35 beers per week. Family History   Problem Relation Age of Onset    Other Father 80        , pneumonia.  Other Mother         Alive, 80, in good health. Vitals:    21 1331 21 1344   BP: (!) 144/90 (!) 140/80   Site: Left Upper Arm Left Upper Arm   Position: Sitting Sitting   Cuff Size: Large Adult Large Adult   Pulse: 91    Resp: 14    Temp: 98 °F (36.7 °C)    TempSrc: Temporal    SpO2: 98%    Weight: 228 lb (103.4 kg)    Height: 6' 2\" (1.88 m)      Estimated body mass index is 29.27 kg/m² as calculated from the following:    Height as of this encounter: 6' 2\" (1.88 m). Weight as of this encounter: 228 lb (103.4 kg). Physical Exam  Vitals and nursing note reviewed. Constitutional:       General: He is not in acute distress. HENT:      Head: Normocephalic and atraumatic.       Right Ear: Tympanic membrane, ear canal and external ear normal.      Left Ear: Tympanic membrane, ear canal and external ear normal.      Nose: Nose normal.      Mouth/Throat: Mouth: Mucous membranes are moist.      Pharynx: No posterior oropharyngeal erythema. Eyes:      General: Lids are normal.      Extraocular Movements: Extraocular movements intact. Conjunctiva/sclera: Conjunctivae normal.      Pupils: Pupils are equal, round, and reactive to light. Neck:      Thyroid: No thyromegaly. Vascular: No JVD. Trachea: No tracheal deviation. Cardiovascular:      Rate and Rhythm: Normal rate and regular rhythm. Heart sounds: Normal heart sounds. No gallop. Pulmonary:      Effort: Pulmonary effort is normal. No respiratory distress. Breath sounds: Normal breath sounds. No wheezing or rales. Abdominal:      General: Bowel sounds are normal.      Palpations: Abdomen is soft. There is no mass. Tenderness: There is no abdominal tenderness. Musculoskeletal:         General: No tenderness. Cervical back: Neck supple. Comments: No leg edema or calf tenderness   Lymphadenopathy:      Cervical: No cervical adenopathy. Skin:     General: Skin is warm and dry. Findings: Bruising present. No rash. Neurological:      General: No focal deficit present. Mental Status: He is alert and oriented to person, place, and time. Cranial Nerves: No cranial nerve deficit. Sensory: No sensory deficit. Psychiatric:         Mood and Affect: Mood normal.         Behavior: Behavior normal.         Thought Content: Thought content normal.         Judgment: Judgment normal.         ASSESSMENT/PLAN:  1. Essential hypertension    - Comprehensive Metabolic Panel; Future    2. Pure hypercholesterolemia    - Lipid Panel; Future    3. Chronic hepatitis C without hepatic coma (HCC)    - Pierre Hernandez MD, Gastroenterology, [facility]    4. Class 1 obesity due to excess calories with serious comorbidity and body mass index (BMI) of 30.0 to 30.9 in adult  Lose weight    5.  Benign prostatic hyperplasia (BPH) with urinary urgency    - PSA, Prostatic Specific Antigen; Future  - Urinalysis Reflex to Culture; Future    6. Cigarette nicotine dependence without complication    - CT Lung Screen (Initial or Annual); Future    7. Bruising    - CBC Auto Differential; Future  - Protime-INR; Future  - APTT; Future      Return in about 4 weeks (around 10/26/2021) for annual wellness visit. Patient Instructions   Fasting blood work tomorrow here. Do CT scan of chest [facility]    Hypertension    Extensive counseling done to keep low sodium diet and  Avoid potato chips, pretzels, sauerkraut , ham , sausage, kelley , salty crackers , salty french fries, salty nuts, salty popcorn etc.  Use only low sodium soups. No salted canned vegetables. Use fresh or frozen vegetables. No salt shaker use. May use Mrs. Shaw as a salt substitute. Avoid weight gain. Regular exercise program.  Keep taking blood pressure medications regularly. If blood pressure staying above 130/85 or having side effects with blood pressure medications, then bring the blood pressure and pulse recorded twice a day to an appointment sooner than scheduled one. Strongly encouraged to quit using tobacco.   You can decrease your cigarette use by half every 1-2 weeks to quit smoking in 4-8 weeks or so. You can use off and on nicotine gum or lozenges to help quit smoking. The [address] for Disease Control and Prevention states:    Tobacco use harms every organ of the body which leads to disease and disability.  Tobacco use causes cancer, heart disease, stroke, and lung diseases (including emphysema, bronchitis, and chronic airway obstruction). We have many other ways to help you quit using tobacco.     We suggest this website:  www.smokefree. gov   You might also like this cell phone text message program.  Text message charges apply on your cell phone plan. Text the word QUIT to IQUIT to get started.  This program offers free telephone counseling.   Dial 1-800-QUIT-Now    Hyperlipidemia    Explained -- to keep least cheese butter or fried food. Grilled baked or broiled meats. No breaded meats. Keep trying to get to a healthy weight. Keep regular exercise program.  Discussed use, benefit, and side effects of prescribed medications. Barriers to compliance discussed. All patient questions answered. Pt voiced understanding. IF YOU NEED A PRESCRIPTION REFILL, THEN PLEASE GIVE US THREE WORKING DAYS TO REFILL A PRESCRIPTION. Office Hours to Answer Questions--Tuesday thru Friday --9.00 AM to 4.00 PM    Please get all OVER DUE VACCINATIONS done at the pharmacy or health department as soon as possible. Electronically signed by Antonio Rahman MD on 9/28/2021 at 2:51 PM     This dictation was generated by voice recognition computer software. Although all attempts are made to edit the dictation for accuracy, there may be errors in the transcription that are not intended.

## 2021-09-28 NOTE — PATIENT INSTRUCTIONS
Fasting blood work tomorrow here. Do CT scan of chest [facility] [employer]    Hypertension    Extensive counseling done to keep low sodium diet and  Avoid potato chips, pretzels, sauerkraut , ham , sausage, kelley , salty crackers , salty french fries, salty nuts, salty popcorn etc.  Use only low sodium soups. No salted canned vegetables. Use fresh or frozen vegetables. No salt shaker use. May use Mrs. Shaw as a salt substitute. Avoid weight gain. Regular exercise program.  Keep taking blood pressure medications regularly. If blood pressure staying above 130/85 or having side effects with blood pressure medications, then bring the blood pressure and pulse recorded twice a day to an appointment sooner than scheduled one. Strongly encouraged to quit using tobacco.   You can decrease your cigarette use by half every 1-2 weeks to quit smoking in 4-8 weeks or so. You can use off and on nicotine gum or lozenges to help quit smoking. The [address] for Disease Control and Prevention states:    Tobacco use harms every organ of the body which leads to disease and disability.  Tobacco use causes cancer, heart disease, stroke, and lung diseases (including emphysema, bronchitis, and chronic airway obstruction). We have many other ways to help you quit using tobacco.     We suggest this website:  www.smokefree. gov   You might also like this cell phone text message program.  Text message charges apply on your cell phone plan. Text the word QUIT to IQUIT to get started.  This program offers free telephone counseling. Dial 1-800-QUIT-Now    Hyperlipidemia    Explained -- to keep least cheese butter or fried food. Grilled baked or broiled meats. No breaded meats. Keep trying to get to a healthy weight. Keep regular exercise program.  Discussed use, benefit, and side effects of prescribed medications. Barriers to compliance discussed. All patient questions answered. Pt voiced understanding.    IF YOU NEED A PRESCRIPTION REFILL, THEN PLEASE GIVE US THREE WORKING DAYS TO REFILL A PRESCRIPTION. Office Hours to Answer Questions--Tuesday thru Friday --9.00 AM to 4.00 PM    Please get all OVER DUE VACCINATIONS done at the pharmacy or health department as soon as possible.

## 2021-10-19 ENCOUNTER — HOSPITAL ENCOUNTER (OUTPATIENT)
Dept: CT IMAGING | Age: 71
Discharge: HOME | End: 2021-10-19
Payer: MEDICARE

## 2021-10-19 DIAGNOSIS — F17.210 CIGARETTE NICOTINE DEPENDENCE WITHOUT COMPLICATION: ICD-10-CM

## 2021-10-19 PROCEDURE — 71271 CT THORAX LUNG CANCER SCR C-: CPT

## 2021-10-26 ENCOUNTER — TELEPHONE (OUTPATIENT)
Dept: CT IMAGING | Age: 71
End: 2021-10-26

## 2021-10-26 NOTE — TELEPHONE ENCOUNTER
Dr. Tylor Fitzgerald,    Pt completed CT Lung Screening on 10/19/21 with the following results:       Pulmonary nodules : none.        Significant incidental findings : Nodular liver surface concerning for cirrhosis. Coronary artery calcification noted.        Thank you,  Gavin Jane  Lung Nodule Navigator  [facility]  [phone]

## 2021-10-26 NOTE — TELEPHONE ENCOUNTER
Baseline lung screen on 10/19/21. LRAD1. Recommended screen in one year. Ordering physician notified of results. Results letter mailed.        Domingo Isidro  Lung Nodule Navigator  [facility]  [phone]

## 2021-10-28 ENCOUNTER — OFFICE VISIT (OUTPATIENT)
Dept: INTERNAL MEDICINE CLINIC | Age: 71
End: 2021-10-28
Payer: MEDICARE

## 2021-10-28 VITALS
DIASTOLIC BLOOD PRESSURE: 84 MMHG | HEART RATE: 84 BPM | OXYGEN SATURATION: 97 % | SYSTOLIC BLOOD PRESSURE: 136 MMHG | WEIGHT: 232.2 LBS | TEMPERATURE: 98.7 F | BODY MASS INDEX: 29.8 KG/M2 | HEIGHT: 74 IN

## 2021-10-28 DIAGNOSIS — I25.10 ATHEROSCLEROSIS OF NATIVE CORONARY ARTERY OF NATIVE HEART WITHOUT ANGINA PECTORIS: ICD-10-CM

## 2021-10-28 DIAGNOSIS — Z00.00 ROUTINE GENERAL MEDICAL EXAMINATION AT A HEALTH CARE FACILITY: Primary | ICD-10-CM

## 2021-10-28 DIAGNOSIS — E78.00 PURE HYPERCHOLESTEROLEMIA: ICD-10-CM

## 2021-10-28 DIAGNOSIS — I10 ESSENTIAL HYPERTENSION: ICD-10-CM

## 2021-10-28 DIAGNOSIS — K70.30 ALCOHOLIC CIRRHOSIS OF LIVER WITHOUT ASCITES (HCC): ICD-10-CM

## 2021-10-28 DIAGNOSIS — Z23 NEED FOR INFLUENZA VACCINATION: ICD-10-CM

## 2021-10-28 PROCEDURE — G8427 DOCREV CUR MEDS BY ELIG CLIN: HCPCS | Performed by: INTERNAL MEDICINE

## 2021-10-28 PROCEDURE — G8484 FLU IMMUNIZE NO ADMIN: HCPCS | Performed by: INTERNAL MEDICINE

## 2021-10-28 PROCEDURE — 1123F ACP DISCUSS/DSCN MKR DOCD: CPT | Performed by: INTERNAL MEDICINE

## 2021-10-28 PROCEDURE — G8417 CALC BMI ABV UP PARAM F/U: HCPCS | Performed by: INTERNAL MEDICINE

## 2021-10-28 PROCEDURE — G0439 PPPS, SUBSEQ VISIT: HCPCS | Performed by: INTERNAL MEDICINE

## 2021-10-28 PROCEDURE — 99213 OFFICE O/P EST LOW 20 MIN: CPT | Performed by: INTERNAL MEDICINE

## 2021-10-28 PROCEDURE — 4004F PT TOBACCO SCREEN RCVD TLK: CPT | Performed by: INTERNAL MEDICINE

## 2021-10-28 PROCEDURE — 90694 VACC AIIV4 NO PRSRV 0.5ML IM: CPT | Performed by: INTERNAL MEDICINE

## 2021-10-28 PROCEDURE — G0008 ADMIN INFLUENZA VIRUS VAC: HCPCS | Performed by: INTERNAL MEDICINE

## 2021-10-28 PROCEDURE — 93000 ELECTROCARDIOGRAM COMPLETE: CPT | Performed by: INTERNAL MEDICINE

## 2021-10-28 PROCEDURE — 4040F PNEUMOC VAC/ADMIN/RCVD: CPT | Performed by: INTERNAL MEDICINE

## 2021-10-28 PROCEDURE — 3017F COLORECTAL CA SCREEN DOC REV: CPT | Performed by: INTERNAL MEDICINE

## 2021-10-28 RX ORDER — ASPIRIN 81 MG/1
81 TABLET, CHEWABLE ORAL DAILY
Qty: 30 TABLET | Refills: 3 | Status: SHIPPED | OUTPATIENT
Start: 2021-10-28

## 2021-10-28 RX ORDER — ATORVASTATIN CALCIUM 20 MG/1
20 TABLET, FILM COATED ORAL DAILY
Qty: 30 TABLET | Refills: 3 | Status: SHIPPED | OUTPATIENT
Start: 2021-10-28 | End: 2022-03-23

## 2021-10-28 ASSESSMENT — ENCOUNTER SYMPTOMS
COUGH: 0
SHORTNESS OF BREATH: 0
CHEST TIGHTNESS: 0
TROUBLE SWALLOWING: 0
VOMITING: 0
BLOOD IN STOOL: 0
NAUSEA: 0
RHINORRHEA: 0
WHEEZING: 0
ABDOMINAL PAIN: 0
EYE PAIN: 0
EYE DISCHARGE: 0
SORE THROAT: 0
SINUS PAIN: 0
SINUS PRESSURE: 0

## 2021-10-28 ASSESSMENT — PATIENT HEALTH QUESTIONNAIRE - PHQ9
SUM OF ALL RESPONSES TO PHQ9 QUESTIONS 1 & 2: 0
1. LITTLE INTEREST OR PLEASURE IN DOING THINGS: 0
SUM OF ALL RESPONSES TO PHQ QUESTIONS 1-9: 0
2. FEELING DOWN, DEPRESSED OR HOPELESS: 0

## 2021-10-28 ASSESSMENT — LIFESTYLE VARIABLES
HOW OFTEN DURING THE LAST YEAR HAVE YOU FAILED TO DO WHAT WAS NORMALLY EXPECTED FROM YOU BECAUSE OF DRINKING: 0
HOW OFTEN DO YOU HAVE A DRINK CONTAINING ALCOHOL: 4
HOW OFTEN DURING THE LAST YEAR HAVE YOU BEEN UNABLE TO REMEMBER WHAT HAPPENED THE NIGHT BEFORE BECAUSE YOU HAD BEEN DRINKING: 0
HOW OFTEN DURING THE LAST YEAR HAVE YOU FOUND THAT YOU WERE NOT ABLE TO STOP DRINKING ONCE YOU HAD STARTED: 0
HOW OFTEN DO YOU HAVE SIX OR MORE DRINKS ON ONE OCCASION: 2
HAS A RELATIVE, FRIEND, DOCTOR, OR ANOTHER HEALTH PROFESSIONAL EXPRESSED CONCERN ABOUT YOUR DRINKING OR SUGGESTED YOU CUT DOWN: 0
AUDIT TOTAL SCORE: 7
HAVE YOU OR SOMEONE ELSE BEEN INJURED AS A RESULT OF YOUR DRINKING: 0
HOW MANY STANDARD DRINKS CONTAINING ALCOHOL DO YOU HAVE ON A TYPICAL DAY: 1
AUDIT-C TOTAL SCORE: 7
HOW OFTEN DURING THE LAST YEAR HAVE YOU NEEDED AN ALCOHOLIC DRINK FIRST THING IN THE MORNING TO GET YOURSELF GOING AFTER A NIGHT OF HEAVY DRINKING: 0
HOW OFTEN DURING THE LAST YEAR HAVE YOU HAD A FEELING OF GUILT OR REMORSE AFTER DRINKING: 0

## 2021-10-28 NOTE — PROGRESS NOTES
Medicare Annual Wellness Visit  Name: Charity Simms Date: 10/28/2021   MRN: <[id number]> Sex: Male   Age: 80 y.o. Ethnicity: Non- / Non    : 1950 Race: White (non-)      Catalina Melo is here for Medicare AWV and Discuss Labs    Screenings for behavioral, psychosocial and functional/safety risks, and cognitive dysfunction are all negative except as indicated below. These results, as well as other patient data from the [address] form, are documented in Flowsheets linked to this Encounter. No Known Allergies      Prior to Visit Medications    Medication Sig Taking? Authorizing Provider   atorvastatin (LIPITOR) 20 MG tablet Take 1 tablet by mouth daily Yes Blayne Yeung MD   aspirin (ASPIRIN CHILDRENS) 81 MG chewable tablet Take 1 tablet by mouth daily Yes Blayne Yeung MD   atenolol (TENORMIN) 100 MG tablet Take 100 mg by mouth daily Yes Historical Provider, MD   lisinopril (PRINIVIL;ZESTRIL) 20 MG tablet TAKE ONE TABLET BY MOUTH DAILY Yes Ruth Burton DO         Past Medical History:   Diagnosis Date    Hepatitis C     History of meniscal tear     Right knee     Hyperlipidemia     Hypertension     Impaired fasting glucose        Past Surgical History:   Procedure Laterality Date    COLONOSCOPY  Aug., 2014 (  )    Dr. Kateryna Guerrero - tubular adenomas ( 3 )    COLONOSCOPY  *2019 (  )    Dr. Patricia Epstein - tubular adenoma         Family History   Problem Relation Age of Onset    Other Father 80        , pneumonia.  Other Mother         Alive, 80, in good health.         CareTeam (Including outside providers/suppliers regularly involved in providing care):   Patient Care Team:  Laurie Day MD as PCP - General (Internal Medicine)  Laurie Day MD as PCP - [facility] Empaneled Provider  Taqueria Rodriguez MD as Surgeon (Orthopedic Surgery)    Wt Readings from Last 3 Encounters:   10/28/21 232 lb 3.2 oz (105.3 kg)   21 228 lb (103.4 kg)   06/15/21 236 lb 12.8 oz (107.4 kg)     Vitals:    10/28/21 1221   BP: 136/84   Site: Left Upper Arm   Position: Sitting   Cuff Size: Large Adult   Pulse: 84   Temp: 98.7 °F (37.1 °C)   TempSrc: Oral   SpO2: 97%   Weight: 232 lb 3.2 oz (105.3 kg)   Height: 6' 2\" (1.88 m)     Body mass index is 29.81 kg/m². Based upon direct observation of the patient, evaluation of cognition reveals recent and remote memory intact. Patient's complete Health Risk Assessment and screening values have been reviewed and are found in Flowsheets. The following problems were reviewed today and where indicated follow up appointments were made and/or referrals ordered. Positive Risk Factor Screenings with Interventions:         Substance History:  Social History     Tobacco History     Smoking Status  Current Every Day Smoker Smoking Frequency  0.5 packs/day for 30 years (15 pk yrs)    Smokeless Tobacco Use  Never Used          Alcohol History     Alcohol Use Status  Yes Drinks/Week  0 Standard drinks or equivalent per week Amount  0.0 standard drinks of alcohol/wk Comment  35 beers per week. Drug Use     Drug Use Status  Not Currently          Sexual Activity     Sexually Active  Not Currently               Alcohol Screening: Audit-C Score: 7  Total Score: 7    A score of 8 or more is associated with harmful or hazardous drinking. A score of 13 or more in women, and 15 or more in men, is likely to indicate alcohol dependence. Substance Abuse Interventions:  · Tobacco abuse:  tobacco cessation tips and resources provided    General Health and ACP:  General  In general, how would you say your health is?: Good  In the past 7 days, have you experienced any of the following?  New or Increased Pain, New or Increased Fatigue, Loneliness, Social Isolation, Stress or Anger?: None of These  Do you get the social and emotional support that you need?: Yes  Do you have a Living Will?: (!) No  Advance Directives     Power of [facility] Will ACP-Advance Directive ACP-Power of     Not on File Not on File Not on File Not on File      General Health Risk Interventions:  · No Living Will: he is going to get that done     Hearing/Vision:  No exam data present  Hearing/Vision  Do you or your family notice any trouble with your hearing that hasn't been managed with hearing aids?: No  Do you have difficulty driving, watching TV, or doing any of your daily activities because of your eyesight?: (!) Yes  Have you had an eye exam within the past year?: Yes  Hearing/Vision Interventions:  · Vision concerns:  keeping eye specialist f/u    Safety:  Safety  Do you have working smoke detectors?: Yes  Have all throw rugs been removed or fastened?: (!) No  Do you have non-slip mats or surfaces in all bathtubs/showers?: Yes  Do all of your stairways have a railing or banister?: Yes  Are your doorways, halls and stairs free of clutter?: Yes  Do you always fasten your seatbelt when you are in a car?: Yes  Safety Interventions:  Home safety tips provided       10/28/2021     Adali Garces (: 1950) is a 80 y.o. male, here for evaluation of the following medical concerns:    Chief Complaint   Patient presents with    Medicare AWV    Discuss Labs         CT chest shows. Pulmonary nodules : none.      Significant incidental findings : Nodular liver surface concerning for cirrhosis. Coronary artery calcification noted.     Other Incidentals: none    He has not scheduled appointment with the liver specialist yet for chronic hepatitis C but now he needs to follow-up for cirrhosis also. And they need more testing with how much of the scarring in the liver they have. Coronary Artery Disease    For coronary artery disease risk factors explained about the smoking and the cholesterol part and will start a baby aspirin as well as atorvastatin and see how he does with that. He will need to do stress test if any chest pains.   Plaque hypothesis explained. No chest pains. No other heart symptoms. Able to exercise. Hyperlipidemia    he has been watching low fat diet. Reminded to keep doing regular exercise 3 to 4 days a week. Must keep trying to lose weight. He needs to start taking cholesterol-lowering medication  The available labs reviewed and analyzed and independent interpretation of the results explained at length. Lab Results       Component                Value               Date                       CHOL                     227 (H)             10/07/2021                 TRIG                     122                 10/07/2021                 HDL                      45                  10/07/2021                 ALT                      18                  10/07/2021                 AST                      28                  10/07/2021                Review of Systems   Constitutional: Negative for appetite change, chills, fever and unexpected weight change. HENT: Negative for congestion, ear discharge, ear pain, nosebleeds, rhinorrhea, sinus pressure, sinus pain, sore throat and trouble swallowing. Eyes: Negative for pain and discharge. Respiratory: Negative for cough, chest tightness, shortness of breath and wheezing. Cardiovascular: Negative for chest pain, palpitations and leg swelling. Gastrointestinal: Negative for abdominal pain, blood in stool, nausea and vomiting. Endocrine: Negative for polydipsia and polyphagia. Genitourinary: Negative for difficulty urinating, enuresis, flank pain and hematuria. Musculoskeletal: Negative for myalgias. Skin: Negative for rash. Neurological: Negative for facial asymmetry, weakness, light-headedness, numbness and headaches. Psychiatric/Behavioral: Negative for confusion.        Current Outpatient Medications on File Prior to Visit   Medication Sig Dispense Refill    atenolol (TENORMIN) 100 MG tablet Take 100 mg by mouth daily      lisinopril (PRINIVIL;ZESTRIL) 20 MG tablet TAKE ONE TABLET BY MOUTH DAILY 90 tablet 3     No current facility-administered medications on file prior to visit. Past Medical History:   Diagnosis Date    Hepatitis C     History of meniscal tear     Right knee     Hyperlipidemia     Hypertension     Impaired fasting glucose       Social History     Tobacco Use    Smoking status: Current Every Day Smoker     Packs/day: 0.50     Years: 30.00     Pack years: 15.00    Smokeless tobacco: Never Used   Substance Use Topics    Alcohol use: Yes     Alcohol/week: 0.0 standard drinks     Comment: 35 beers per week. Family History   Problem Relation Age of Onset    Other Father 80        , pneumonia.  Other Mother         Alive, 80, in good health. Vitals:    10/28/21 1221   BP: 136/84   Site: Left Upper Arm   Position: Sitting   Cuff Size: Large Adult   Pulse: 84   Temp: 98.7 °F (37.1 °C)   TempSrc: Oral   SpO2: 97%   Weight: 232 lb 3.2 oz (105.3 kg)   Height: 6' 2\" (1.88 m)     Estimated body mass index is 29.81 kg/m² as calculated from the following:    Height as of this encounter: 6' 2\" (1.88 m). Weight as of this encounter: 232 lb 3.2 oz (105.3 kg). Physical Exam  Vitals and nursing note reviewed. Constitutional:       General: He is not in acute distress. HENT:      Head: Normocephalic and atraumatic. Right Ear: External ear normal.      Left Ear: External ear normal.   Eyes:      General: Lids are normal.      Conjunctiva/sclera: Conjunctivae normal.      Pupils: Pupils are equal, round, and reactive to light. Neck:      Thyroid: No thyromegaly. Vascular: No JVD. Trachea: No tracheal deviation. Cardiovascular:      Rate and Rhythm: Normal rate and regular rhythm. Heart sounds: Normal heart sounds. No gallop. Pulmonary:      Effort: Pulmonary effort is normal. No respiratory distress. Breath sounds: Normal breath sounds. No wheezing or rales.    Abdominal:      General: Bowel sounds are normal.      Palpations: Abdomen is soft. There is no mass. Tenderness: There is no abdominal tenderness. Musculoskeletal:         General: No tenderness. Cervical back: Neck supple. Comments: No leg edema or calf tenderness   Lymphadenopathy:      Cervical: No cervical adenopathy. Skin:     General: Skin is warm and dry. Findings: No rash. Neurological:      General: No focal deficit present. Mental Status: He is alert and oriented to person, place, and time. Cranial Nerves: No cranial nerve deficit. Sensory: No sensory deficit. Psychiatric:         Mood and Affect: Mood normal.         Behavior: Behavior normal.         Thought Content: Thought content normal.         Judgment: Judgment normal.         ASSESSMENT/PLAN:  1. Routine general medical examination at a health care facility  Yearly exam    2. Need for influenza vaccination    - INFLUENZA, QUADV, ADJUVANTED, 65 YRS =, IM, PF, PREFILL SYR, 0.5ML (FLUAD)    3. Atherosclerosis of native coronary artery of native heart without angina pectoris    - atorvastatin (LIPITOR) 20 MG tablet; Take 1 tablet by mouth daily  Dispense: 30 tablet; Refill: 3  - aspirin (ASPIRIN CHILDRENS) 81 MG chewable tablet; Take 1 tablet by mouth daily  Dispense: 30 tablet; Refill: 3  - EKG 12 Lead    4. Essential hypertension  Low-sodium diet and blood pressure medicine    5. Pure hypercholesterolemia    - Basic Metabolic Panel; Future  - CK; Future  - Hepatic Function Panel; Future  - Lipid Panel; Future    6. Alcoholic cirrhosis of liver without ascites ([facility] [address]    Wean off alcohol and check on hepatitis C  Return in 2 months (on 1/12/2022) for  high cholesterol, blood pressure. Patient Instructions   Fasting blood work in January and follow-up after that. Baby aspirin after food. Call and see the liver specialist for cirrhosis of liver. Atorvastatin with the dinner.     If any chest pain then we do need to get him to see cardiologist.    Limit cheese butter fried foods and lot more vegetables fruit lean meats grilled or broiled. Low-sodium diet and blood pressure watch. Keep weaning down on the cigarette part as well as alcohol     personalized Preventive Plan for Jose Oquendo - 10/28/2021  Medicare offers a range of preventive health benefits. Some of the tests and screenings are paid in full while other may be subject to a deductible, co-insurance, and/or copay. Some of these benefits include a comprehensive review of your medical history including lifestyle, illnesses that may run in your family, and various assessments and screenings as appropriate. After reviewing your medical record and screening and assessments performed today your provider may have ordered immunizations, labs, imaging, and/or referrals for you. A list of these orders (if applicable) as well as your Preventive Care list are included within your After Visit Summary for your review. Other Preventive Recommendations:    A preventive eye exam performed by an eye specialist is recommended every 1-2 years to screen for glaucoma; cataracts, macular degeneration, and other eye disorders. A preventive dental visit is recommended every 6 months. Try to get at least 150 minutes of exercise per week or 10,000 steps per day on a pedometer . Order or download the FREE \"Exercise & Physical Activity: Your Everyday Guide\" from [employer]. Call 6-583.382.9789 or search [employer] online. You need 6222-6018 mg of calcium and 7633-0357 IU of vitamin D per day. It is possible to meet your calcium requirement with diet alone, but a vitamin D supplement is usually necessary to meet this goal.  When exposed to the sun, use a sunscreen that protects against both UVA and UVB radiation with an SPF of 30 or greater. Reapply every 2 to 3 hours or after sweating, drying off with a towel, or swimming.   Always wear a seat belt when traveling in a car. Always wear a helmet when riding a bicycle or motorcycle. Electronically signed by Norah Osman MD on 10/28/2021 at 1:11 PM     This dictation was generated by voice recognition computer software. Although all attempts are made to edit the dictation for accuracy, there may be errors in the transcription that are not intended. Personalized Preventive Plan   Current Health Maintenance Status  Immunization History   Administered Date(s) Administered    COVID-19, Pfizer, PF, 30mcg/0.3mL 02/11/2021, 03/02/2021, 10/07/2021    Influenza Vaccine, unspecified formulation 01/05/2013, 01/02/2014, 11/11/2014    Influenza Virus Vaccine 01/05/2013    Influenza, Quadv, adjuvanted, 65 yrs +, IM, PF (Fluad) 10/28/2021    Pneumococcal Conjugate 13-valent (Lonzie Dakota) 10/14/2019    Tdap (Boostrix, Adacel) 06/10/2014        Health Maintenance   Topic Date Due    Hepatitis A vaccine (1 of 2 - Risk 2-dose series) Never done    Shingles Vaccine (1 of 2) Never done    Pneumococcal 65+ years Vaccine (2 of 2 - PPSV23) 10/14/2020    Hepatitis B vaccine (1 of 3 - Risk 3-dose series) 06/15/2022 (Originally 8/3/1969)    Annual Wellness Visit (AWV)  09/29/2022    Potassium monitoring  10/07/2022    Creatinine monitoring  10/07/2022    PSA counseling  10/07/2022    Low dose CT lung screening  10/19/2022    DTaP/Tdap/Td vaccine (2 - Td or Tdap) 06/10/2024    Colon cancer screen colonoscopy  11/26/2024    Lipid screen  10/07/2026    Flu vaccine  Completed    COVID-19 Vaccine  Completed    AAA screen  Completed    Hib vaccine  Aged Out    Meningococcal (ACWY) vaccine  Aged Out     Recommendations for [employer] Due: see orders and patient instructions/AVS.  . Recommended screening schedule for the next 5-10 years is provided to the patient in written form: see Patient Bryce Loya was seen today for medicare awv and discuss labs.     Diagnoses and all orders for this visit:    Routine general medical examination at a health care facility    Need for influenza vaccination  -     INFLUENZA, QUADV, ADJUVANTED, 72 YRS =, IM, PF, PREFILL SYR, 0.5ML (FLUAD)    Atherosclerosis of native coronary artery of native heart without angina pectoris  -     atorvastatin (LIPITOR) 20 MG tablet; Take 1 tablet by mouth daily  -     aspirin (ASPIRIN CHILDRENS) 81 MG chewable tablet; Take 1 tablet by mouth daily  -     EKG 12 Lead    Essential hypertension    Pure hypercholesterolemia  -     Basic Metabolic Panel; Future  -     CK; Future  -     Hepatic Function Panel; Future  -     Lipid Panel;  Future    [facility]

## 2021-10-28 NOTE — PATIENT INSTRUCTIONS
Fasting blood work in January and follow-up after that. Baby aspirin after food. Call and see the liver specialist for cirrhosis of liver. Atorvastatin with the dinner. If any chest pain then we do need to get him to see cardiologist.    Limit cheese butter fried foods and lot more vegetables fruit lean meats grilled or broiled. Low-sodium diet and blood pressure watch. Keep weaning down on the cigarette part as well as alcohol     personalized Preventive Plan for Adali Garces - 10/28/2021  Medicare offers a range of preventive health benefits. Some of the tests and screenings are paid in full while other may be subject to a deductible, co-insurance, and/or copay. Some of these benefits include a comprehensive review of your medical history including lifestyle, illnesses that may run in your family, and various assessments and screenings as appropriate. After reviewing your medical record and screening and assessments performed today your provider may have ordered immunizations, labs, imaging, and/or referrals for you. A list of these orders (if applicable) as well as your Preventive Care list are included within your After Visit Summary for your review. Other Preventive Recommendations:    · A preventive eye exam performed by an eye specialist is recommended every 1-2 years to screen for glaucoma; cataracts, macular degeneration, and other eye disorders. · A preventive dental visit is recommended every 6 months. · Try to get at least 150 minutes of exercise per week or 10,000 steps per day on a pedometer . · Order or download the FREE \"Exercise & Physical Activity: Your Everyday Guide\" from [employer]. Call 4-458.991.2042 or search [employer] online. · You need 0472-0909 mg of calcium and 8794-7051 IU of vitamin D per day.  It is possible to meet your calcium requirement with diet alone, but a vitamin D supplement is usually necessary to meet this goal.  · When exposed to the sun, use a sunscreen that protects against both UVA and UVB radiation with an SPF of 30 or greater. Reapply every 2 to 3 hours or after sweating, drying off with a towel, or swimming. · Always wear a seat belt when traveling in a car. Always wear a helmet when riding a bicycle or motorcycle.

## 2022-03-21 RX ORDER — ATENOLOL 100 MG/1
TABLET ORAL
Qty: 90 TABLET | OUTPATIENT
Start: 2022-03-21

## 2022-03-23 DIAGNOSIS — I25.10 ATHEROSCLEROSIS OF NATIVE CORONARY ARTERY OF NATIVE HEART WITHOUT ANGINA PECTORIS: ICD-10-CM

## 2022-03-23 RX ORDER — ATORVASTATIN CALCIUM 20 MG/1
TABLET, FILM COATED ORAL
Qty: 30 TABLET | Refills: 0 | Status: SHIPPED | OUTPATIENT
Start: 2022-03-23 | End: 2022-05-11

## 2022-05-10 DIAGNOSIS — I25.10 ATHEROSCLEROSIS OF NATIVE CORONARY ARTERY OF NATIVE HEART WITHOUT ANGINA PECTORIS: ICD-10-CM

## 2022-05-11 RX ORDER — ATORVASTATIN CALCIUM 20 MG/1
TABLET, FILM COATED ORAL
Qty: 30 TABLET | Refills: 0 | Status: SHIPPED | OUTPATIENT
Start: 2022-05-11 | End: 2022-06-28 | Stop reason: SDUPTHER

## 2022-06-19 DIAGNOSIS — I25.10 ATHEROSCLEROSIS OF NATIVE CORONARY ARTERY OF NATIVE HEART WITHOUT ANGINA PECTORIS: ICD-10-CM

## 2022-06-21 RX ORDER — ATORVASTATIN CALCIUM 20 MG/1
TABLET, FILM COATED ORAL
Qty: 30 TABLET | Refills: 3 | OUTPATIENT
Start: 2022-06-21

## 2022-06-27 RX ORDER — ATENOLOL 100 MG/1
TABLET ORAL
Qty: 90 TABLET | Refills: 0 | Status: SHIPPED | OUTPATIENT
Start: 2022-06-27 | End: 2022-06-28

## 2022-06-28 ENCOUNTER — OFFICE VISIT (OUTPATIENT)
Dept: INTERNAL MEDICINE CLINIC | Age: 72
End: 2022-06-28
Payer: MEDICARE

## 2022-06-28 VITALS
SYSTOLIC BLOOD PRESSURE: 140 MMHG | BODY MASS INDEX: 30.8 KG/M2 | HEIGHT: 74 IN | TEMPERATURE: 97.5 F | HEART RATE: 71 BPM | OXYGEN SATURATION: 98 % | WEIGHT: 240 LBS | DIASTOLIC BLOOD PRESSURE: 90 MMHG

## 2022-06-28 DIAGNOSIS — K70.30 ALCOHOLIC CIRRHOSIS OF LIVER WITHOUT ASCITES (HCC): ICD-10-CM

## 2022-06-28 DIAGNOSIS — I10 ESSENTIAL HYPERTENSION: Primary | ICD-10-CM

## 2022-06-28 DIAGNOSIS — E78.00 PURE HYPERCHOLESTEROLEMIA: ICD-10-CM

## 2022-06-28 DIAGNOSIS — I25.10 ATHEROSCLEROSIS OF NATIVE CORONARY ARTERY OF NATIVE HEART WITHOUT ANGINA PECTORIS: ICD-10-CM

## 2022-06-28 DIAGNOSIS — B18.2 CHRONIC HEPATITIS C WITHOUT HEPATIC COMA (HCC): ICD-10-CM

## 2022-06-28 PROCEDURE — 1123F ACP DISCUSS/DSCN MKR DOCD: CPT | Performed by: INTERNAL MEDICINE

## 2022-06-28 PROCEDURE — 3017F COLORECTAL CA SCREEN DOC REV: CPT | Performed by: INTERNAL MEDICINE

## 2022-06-28 PROCEDURE — 99214 OFFICE O/P EST MOD 30 MIN: CPT | Performed by: INTERNAL MEDICINE

## 2022-06-28 PROCEDURE — 4004F PT TOBACCO SCREEN RCVD TLK: CPT | Performed by: INTERNAL MEDICINE

## 2022-06-28 PROCEDURE — G8417 CALC BMI ABV UP PARAM F/U: HCPCS | Performed by: INTERNAL MEDICINE

## 2022-06-28 PROCEDURE — G8427 DOCREV CUR MEDS BY ELIG CLIN: HCPCS | Performed by: INTERNAL MEDICINE

## 2022-06-28 RX ORDER — ATORVASTATIN CALCIUM 20 MG/1
20 TABLET, FILM COATED ORAL DAILY
Qty: 30 TABLET | Refills: 0 | Status: SHIPPED | OUTPATIENT
Start: 2022-06-28 | End: 2022-07-13

## 2022-06-28 RX ORDER — ATENOLOL 50 MG/1
50 TABLET ORAL DAILY
Qty: 30 TABLET | Refills: 0 | Status: SHIPPED | OUTPATIENT
Start: 2022-06-28 | End: 2022-07-13

## 2022-06-28 SDOH — ECONOMIC STABILITY: FOOD INSECURITY: WITHIN THE PAST 12 MONTHS, THE FOOD YOU BOUGHT JUST DIDN'T LAST AND YOU DIDN'T HAVE MONEY TO GET MORE.: NEVER TRUE

## 2022-06-28 SDOH — ECONOMIC STABILITY: FOOD INSECURITY: WITHIN THE PAST 12 MONTHS, YOU WORRIED THAT YOUR FOOD WOULD RUN OUT BEFORE YOU GOT MONEY TO BUY MORE.: NEVER TRUE

## 2022-06-28 ASSESSMENT — ENCOUNTER SYMPTOMS
SINUS PAIN: 0
NAUSEA: 0
TROUBLE SWALLOWING: 0
SORE THROAT: 0
EYE PAIN: 0
COUGH: 0
EYE DISCHARGE: 0
BLOOD IN STOOL: 0
WHEEZING: 0
SINUS PRESSURE: 0
VOMITING: 0
SHORTNESS OF BREATH: 0
CHEST TIGHTNESS: 0
ABDOMINAL PAIN: 0
RHINORRHEA: 0

## 2022-06-28 ASSESSMENT — PATIENT HEALTH QUESTIONNAIRE - PHQ9
1. LITTLE INTEREST OR PLEASURE IN DOING THINGS: 0
SUM OF ALL RESPONSES TO PHQ QUESTIONS 1-9: 0
2. FEELING DOWN, DEPRESSED OR HOPELESS: 0
SUM OF ALL RESPONSES TO PHQ9 QUESTIONS 1 & 2: 0

## 2022-06-28 ASSESSMENT — SOCIAL DETERMINANTS OF HEALTH (SDOH): HOW HARD IS IT FOR YOU TO PAY FOR THE VERY BASICS LIKE FOOD, HOUSING, MEDICAL CARE, AND HEATING?: NOT HARD AT ALL

## 2022-06-28 NOTE — PATIENT INSTRUCTIONS
Fasting Blood work tomorrow morning. Keep GI f/u to upper scope and lower scope and check on hepatitis C with them    Decrease alcohol use    Hypertension    Extensive counseling done to keep low sodium diet and  Avoid potato chips, pretzels, sauerkraut , ham , sausage, kelley , salty crackers , salty french fries, salty nuts, salty popcorn etc.  Use only low sodium soups. No salted canned vegetables. Use fresh or frozen vegetables. No salt shaker use. May use Mrs. Shaw as a salt substitute. Avoid weight gain. Regular exercise program.  Keep taking blood pressure medications regularly. If blood pressure staying above 130/85 or having side effects with blood pressure medications, then bring the blood pressure and pulse recorded twice a day to an appointment sooner than scheduled one. Hyperlipidemia    Explained -- to keep least cheese butter or fried food. Grilled baked or broiled meats. No breaded meats. Keep trying to get to a healthy weight. Keep regular exercise program.  Keep taking cholesterol lowering medication regularly. Discussed use, benefit, and side effects of prescribed medications. Barriers to compliance discussed. All patient questions answered. Pt voiced understanding. IF YOU NEED A PRESCRIPTION REFILL, THEN PLEASE GIVE US THREE WORKING DAYS TO REFILL A PRESCRIPTION. May go to urgent care or Emergency room or call to be seen in the office sooner than scheduled follow-up appointment,if condition worsens. Please get all OVER DUE Shingles/ pneumococcal VACCINATIONS done at the pharmacy or health department as soon as possible.

## 2022-06-28 NOTE — PROGRESS NOTES
2022     Steven Larios (: 1950) is a 80 y.o. male, here for evaluation of the following medical concerns:    Chief Complaint   Patient presents with    Hypertension    Hyperlipidemia        Hypertension--he was supposed to be doing blood work in January and follow-up after that but he never kept a follow-up here and he has been out of blood pressure medication and atenolol he is saying he used to take 25 mg and then increased further or not but the last prescription we see is 100 mg so unless he checks blood pressure and I am going to give him prescription for 50 mg and then he can adjust half a tablet a whole tablet and see how he does with blood pressure. He is taking lisinopril but blood pressure still elevated. Watching low-sodium diet. Taking blood pressure medications . Blood pressure checked off and on and trying to keep a goal of blood pressure less than 130/85 most of the time. Denies any chest pain / palpitation / shortness of breath / lightheadedness etc.   The available labs reviewed and analyzed and independent interpretation of the results explained at length.   Lab Results       Component                Value               Date                       NA                       139                 10/07/2021                 K                        4.4                 10/07/2021                 CL                       101                 10/07/2021                 CO2                      21                  10/07/2021                 BUN                      5                   10/07/2021                 CREATININE               0.8                 10/07/2021                 GLUCOSE                  70                  10/07/2021                 CALCIUM                  9.3                 10/07/2021                etoh cirrhosis--Dr Banuelos--u/s ok--EGD recommended-to check for esophageal varices and he has not done that and he is going to check on the colonoscopy with them 2.-not rescheduled yet--they have been trying to reach him a number of times and then we had to call him and he has not been keeping his follow-up here either. He does not remember what they told him about hepatitis C so explained to him that he needs to check with Dr. Lyudmila Jansen again. He is still drinking alcohol and neither watercolor wine and explained to him that is still not going to good for his liver. Hyperlipidemia    he has not been watching low fat diet. He is eating cheese with every meal and explained to him that is not going to be good for his cholesterol and heart disease. Reminded to keep doing regular exercise 3 to 4 days a week. Must keep trying to lose weight.    he has been tolerating cholesterol medications without any side effects. The available labs reviewed and analyzed and independent interpretation of the results explained at length. Lab Results       Component                Value               Date                       CHOL                     227 (H)             10/07/2021                 TRIG                     122                 10/07/2021                 HDL                      45                  10/07/2021                 ALT                      18                  10/07/2021                 AST                      28                  10/07/2021              Smoking    The [address] for Disease Control and Prevention states:    Tobacco use harms every organ of the body which leads to disease and disability. Tobacco use causes cancer, heart disease, stroke, and lung diseases (including emphysema, bronchitis, and chronic airway obstruction). strongly encouraged  to quit using tobacco.   You can decrease your cigarette use by half every 1-2 weeks to quit smoking in 4-8 weeks or so. You can use off and on nicotine gum or lozenges to help quit smoking.         Review of Systems   Constitutional: Negative for appetite change, chills, fever and unexpected weight change. HENT: Negative for congestion, ear discharge, ear pain, nosebleeds, rhinorrhea, sinus pressure, sinus pain, sore throat and trouble swallowing. Eyes: Negative for pain and discharge. Respiratory: Negative for cough, chest tightness, shortness of breath and wheezing. Cardiovascular: Negative for chest pain, palpitations and leg swelling. Gastrointestinal: Negative for abdominal pain, blood in stool, nausea and vomiting. Endocrine: Negative for polydipsia and polyphagia. Genitourinary: Negative for difficulty urinating, enuresis, flank pain and hematuria. Musculoskeletal: Negative for myalgias. Skin: Negative for rash. Neurological: Negative for facial asymmetry, weakness, light-headedness, numbness and headaches. Psychiatric/Behavioral: Negative for confusion. Current Outpatient Medications on File Prior to Visit   Medication Sig Dispense Refill    aspirin (ASPIRIN CHILDRENS) 81 MG chewable tablet Take 1 tablet by mouth daily 30 tablet 3    lisinopril (PRINIVIL;ZESTRIL) 20 MG tablet TAKE ONE TABLET BY MOUTH DAILY 90 tablet 3     No current facility-administered medications on file prior to visit. Past Medical History:   Diagnosis Date    Hepatitis C     History of meniscal tear     Right knee     Hyperlipidemia     Hypertension     Impaired fasting glucose       Social History     Tobacco Use    Smoking status: Current Every Day Smoker     Packs/day: 0.50     Years: 30.00     Pack years: 15.00    Smokeless tobacco: Never Used   Substance Use Topics    Alcohol use: Yes     Comment: 2vodka/soda or wine 2 glasses per week. no beers      Family History   Problem Relation Age of Onset    Other Father 80        , pneumonia.  Other Mother         Alive, 80, in good health.          Vitals:    22 1640 22 1659   BP: (!) 140/80 (!) 140/90   Site: Left Upper Arm    Position: Sitting    Cuff Size: Large Adult    Pulse: 71    Temp: 97.5 °F (36.4 °C) TempSrc: Temporal    SpO2: 98%    Weight: 240 lb (108.9 kg)    Height: 6' 2\" (1.88 m)      Estimated body mass index is 30.81 kg/m² as calculated from the following:    Height as of this encounter: 6' 2\" (1.88 m). Weight as of this encounter: 240 lb (108.9 kg). Physical Exam  Vitals and nursing note reviewed. Constitutional:       General: He is not in acute distress. HENT:      Head: Normocephalic and atraumatic. Right Ear: External ear normal.      Left Ear: External ear normal.   Eyes:      General: Lids are normal.      Conjunctiva/sclera: Conjunctivae normal.      Pupils: Pupils are equal, round, and reactive to light. Neck:      Thyroid: No thyromegaly. Vascular: No JVD. Trachea: No tracheal deviation. Cardiovascular:      Rate and Rhythm: Normal rate and regular rhythm. Heart sounds: Normal heart sounds. No gallop. Pulmonary:      Effort: Pulmonary effort is normal. No respiratory distress. Breath sounds: Normal breath sounds. No wheezing or rales. Abdominal:      General: Bowel sounds are normal.      Palpations: Abdomen is soft. There is no mass. Tenderness: There is no abdominal tenderness. Musculoskeletal:         General: No tenderness. Cervical back: Neck supple. Comments: No leg edema or calf tenderness   Lymphadenopathy:      Cervical: No cervical adenopathy. Skin:     General: Skin is warm and dry. Findings: No rash. Neurological:      Mental Status: He is alert and oriented to person, place, and time. Cranial Nerves: No cranial nerve deficit. Sensory: No sensory deficit. Psychiatric:         Behavior: Behavior normal.         Thought Content: Thought content normal.         ASSESSMENT/PLAN:  1. Essential hypertension    - atenolol (TENORMIN) 50 MG tablet; Take 1 tablet by mouth daily  Dispense: 30 tablet; Refill: 0    2. Pure hypercholesterolemia    - atorvastatin (LIPITOR) 20 MG tablet;  Take 1 tablet by mouth daily Dispense: 30 tablet; Refill: 0    3. Alcoholic cirrhosis of liver without ascites (HCC)  GI:    4. Chronic hepatitis C without hepatic coma (HCC)  GI    5. Atherosclerosis of native coronary artery of native heart without angina pectoris  Risk factor control      Return in about 4 weeks (around 7/26/2022) for blood pressure. Patient Instructions   Fasting Blood work tomorrow morning. Keep GI f/u to upper scope and lower scope and check on hepatitis C with them    Decrease alcohol use    Hypertension    Extensive counseling done to keep low sodium diet and  Avoid potato chips, pretzels, sauerkraut , ham , sausage, kelley , salty crackers , salty french fries, salty nuts, salty popcorn etc.  Use only low sodium soups. No salted canned vegetables. Use fresh or frozen vegetables. No salt shaker use. May use Mrs. Shaw as a salt substitute. Avoid weight gain. Regular exercise program.  Keep taking blood pressure medications regularly. If blood pressure staying above 130/85 or having side effects with blood pressure medications, then bring the blood pressure and pulse recorded twice a day to an appointment sooner than scheduled one. Hyperlipidemia    Explained -- to keep least cheese butter or fried food. Grilled baked or broiled meats. No breaded meats. Keep trying to get to a healthy weight. Keep regular exercise program.  Keep taking cholesterol lowering medication regularly. Discussed use, benefit, and side effects of prescribed medications. Barriers to compliance discussed. All patient questions answered. Pt voiced understanding. IF YOU NEED A PRESCRIPTION REFILL, THEN PLEASE GIVE US THREE WORKING DAYS TO REFILL A PRESCRIPTION. May go to urgent care or Emergency room or call to be seen in the office sooner than scheduled follow-up appointment,if condition worsens.     Please get all OVER DUE Shingles/ pneumococcal VACCINATIONS done at the pharmacy or health department as soon as possible. Electronically signed by Ilene Muniz MD on 6/28/2022 at 5:52 PM     This dictation was generated by voice recognition computer software. Although all attempts are made to edit the dictation for accuracy, there may be errors in the transcription that are not intended.

## 2022-06-29 DIAGNOSIS — E78.00 PURE HYPERCHOLESTEROLEMIA: ICD-10-CM

## 2022-06-29 DIAGNOSIS — I10 ESSENTIAL HYPERTENSION: ICD-10-CM

## 2022-06-29 LAB
ALBUMIN SERPL-MCNC: 4.6 G/DL (ref 3.4–5)
ALP BLD-CCNC: 90 U/L (ref 40–129)
ALT SERPL-CCNC: 20 U/L (ref 10–40)
ANION GAP SERPL CALCULATED.3IONS-SCNC: 16 MMOL/L (ref 3–16)
AST SERPL-CCNC: 29 U/L (ref 15–37)
BILIRUB SERPL-MCNC: 0.8 MG/DL (ref 0–1)
BILIRUBIN DIRECT: <0.2 MG/DL (ref 0–0.3)
BILIRUBIN, INDIRECT: NORMAL MG/DL (ref 0–1)
BUN BLDV-MCNC: 11 MG/DL (ref 7–20)
CALCIUM SERPL-MCNC: 9.6 MG/DL (ref 8.3–10.6)
CHLORIDE BLD-SCNC: 102 MMOL/L (ref 99–110)
CHOLESTEROL, TOTAL: 168 MG/DL (ref 0–199)
CO2: 21 MMOL/L (ref 21–32)
CREAT SERPL-MCNC: 0.8 MG/DL (ref 0.8–1.3)
GFR AFRICAN AMERICAN: >60
GFR NON-AFRICAN AMERICAN: >60
GLUCOSE BLD-MCNC: 98 MG/DL (ref 70–99)
HDLC SERPL-MCNC: 52 MG/DL (ref 40–60)
LDL CHOLESTEROL CALCULATED: 104 MG/DL
POTASSIUM SERPL-SCNC: 4.8 MMOL/L (ref 3.5–5.1)
SODIUM BLD-SCNC: 139 MMOL/L (ref 136–145)
TOTAL CK: 94 U/L (ref 39–308)
TOTAL PROTEIN: 7.7 G/DL (ref 6.4–8.2)
TRIGL SERPL-MCNC: 58 MG/DL (ref 0–150)
VLDLC SERPL CALC-MCNC: 12 MG/DL

## 2022-07-13 RX ORDER — ATORVASTATIN CALCIUM 20 MG/1
20 TABLET, FILM COATED ORAL DAILY
Qty: 90 TABLET | Refills: 0 | Status: SHIPPED | OUTPATIENT
Start: 2022-07-13 | End: 2022-08-02 | Stop reason: SDUPTHER

## 2022-07-13 RX ORDER — ATENOLOL 50 MG/1
50 TABLET ORAL DAILY
Qty: 90 TABLET | Refills: 0 | Status: SHIPPED | OUTPATIENT
Start: 2022-07-13 | End: 2022-07-29

## 2022-07-29 DIAGNOSIS — I10 ESSENTIAL HYPERTENSION: ICD-10-CM

## 2022-07-29 RX ORDER — ATENOLOL 50 MG/1
50 TABLET ORAL DAILY
Qty: 90 TABLET | Refills: 1 | Status: SHIPPED | OUTPATIENT
Start: 2022-07-29 | End: 2022-10-28

## 2022-08-02 ENCOUNTER — OFFICE VISIT (OUTPATIENT)
Dept: INTERNAL MEDICINE CLINIC | Age: 72
End: 2022-08-02
Payer: MEDICARE

## 2022-08-02 VITALS
SYSTOLIC BLOOD PRESSURE: 126 MMHG | HEIGHT: 74 IN | BODY MASS INDEX: 30.29 KG/M2 | WEIGHT: 236 LBS | DIASTOLIC BLOOD PRESSURE: 68 MMHG

## 2022-08-02 DIAGNOSIS — M25.561 CHRONIC PAIN OF BOTH KNEES: ICD-10-CM

## 2022-08-02 DIAGNOSIS — M25.562 CHRONIC PAIN OF BOTH KNEES: ICD-10-CM

## 2022-08-02 DIAGNOSIS — E78.00 PURE HYPERCHOLESTEROLEMIA: ICD-10-CM

## 2022-08-02 DIAGNOSIS — I10 ESSENTIAL HYPERTENSION: ICD-10-CM

## 2022-08-02 DIAGNOSIS — G89.29 CHRONIC PAIN OF BOTH KNEES: ICD-10-CM

## 2022-08-02 DIAGNOSIS — M79.89 PAIN AND SWELLING OF LOWER EXTREMITY, UNSPECIFIED LATERALITY: ICD-10-CM

## 2022-08-02 DIAGNOSIS — I25.10 ATHEROSCLEROSIS OF NATIVE CORONARY ARTERY OF NATIVE HEART WITHOUT ANGINA PECTORIS: Primary | ICD-10-CM

## 2022-08-02 DIAGNOSIS — F17.210 CIGARETTE NICOTINE DEPENDENCE WITHOUT COMPLICATION: ICD-10-CM

## 2022-08-02 DIAGNOSIS — M79.606 PAIN AND SWELLING OF LOWER EXTREMITY, UNSPECIFIED LATERALITY: ICD-10-CM

## 2022-08-02 DIAGNOSIS — K70.30 ALCOHOLIC CIRRHOSIS OF LIVER WITHOUT ASCITES (HCC): ICD-10-CM

## 2022-08-02 PROCEDURE — G8427 DOCREV CUR MEDS BY ELIG CLIN: HCPCS | Performed by: INTERNAL MEDICINE

## 2022-08-02 PROCEDURE — 4004F PT TOBACCO SCREEN RCVD TLK: CPT | Performed by: INTERNAL MEDICINE

## 2022-08-02 PROCEDURE — 3017F COLORECTAL CA SCREEN DOC REV: CPT | Performed by: INTERNAL MEDICINE

## 2022-08-02 PROCEDURE — 99214 OFFICE O/P EST MOD 30 MIN: CPT | Performed by: INTERNAL MEDICINE

## 2022-08-02 PROCEDURE — 1123F ACP DISCUSS/DSCN MKR DOCD: CPT | Performed by: INTERNAL MEDICINE

## 2022-08-02 PROCEDURE — G8417 CALC BMI ABV UP PARAM F/U: HCPCS | Performed by: INTERNAL MEDICINE

## 2022-08-02 RX ORDER — ATORVASTATIN CALCIUM 40 MG/1
40 TABLET, FILM COATED ORAL DAILY
Qty: 90 TABLET | Refills: 0 | Status: SHIPPED | OUTPATIENT
Start: 2022-08-02 | End: 2022-11-01 | Stop reason: SDUPTHER

## 2022-08-02 RX ORDER — SPIRONOLACTONE 25 MG/1
25 TABLET ORAL DAILY
Qty: 30 TABLET | Refills: 0 | Status: SHIPPED | OUTPATIENT
Start: 2022-08-02 | End: 2022-09-06

## 2022-08-02 ASSESSMENT — ENCOUNTER SYMPTOMS
WHEEZING: 0
EYE PAIN: 0
ABDOMINAL PAIN: 0
BLOOD IN STOOL: 0
VOMITING: 0
EYE DISCHARGE: 0
TROUBLE SWALLOWING: 0
SHORTNESS OF BREATH: 0
CHEST TIGHTNESS: 0
NAUSEA: 0
SORE THROAT: 0
SINUS PRESSURE: 0
RHINORRHEA: 0
SINUS PAIN: 0
COUGH: 0

## 2022-08-02 ASSESSMENT — PATIENT HEALTH QUESTIONNAIRE - PHQ9
SUM OF ALL RESPONSES TO PHQ QUESTIONS 1-9: 0
SUM OF ALL RESPONSES TO PHQ9 QUESTIONS 1 & 2: 0
1. LITTLE INTEREST OR PLEASURE IN DOING THINGS: 0
SUM OF ALL RESPONSES TO PHQ QUESTIONS 1-9: 0
SUM OF ALL RESPONSES TO PHQ QUESTIONS 1-9: 0
2. FEELING DOWN, DEPRESSED OR HOPELESS: 0
SUM OF ALL RESPONSES TO PHQ QUESTIONS 1-9: 0

## 2022-08-02 NOTE — PROGRESS NOTES
2022     Angie Hinds (: 1950) is a 80 y.o. male, here for evaluation of the following medical concerns:    Chief Complaint   Patient presents with    Hypertension        Leg swelling and off/ on pain--can be sec to cirrhosis    Still drinking alcohol -not good. He is still trying to schedule EGD/colon with Dr Ana Childress    Hypertension    Watching low-sodium diet. Taking blood pressure medications regularly. Blood pressure checked off and on and trying to keep a goal of blood pressure less than 130/85 most of the time. Denies any chest pain / palpitation / shortness of breath / lightheadedness etc.   The available labs reviewed and analyzed and independent interpretation of the results explained at length. Lab Results       Component                Value               Date/Time                  NA                       139                 2022 11:00 AM        K                        4.8                 2022 11:00 AM        CL                       102                 2022 11:00 AM        CO2                      21                  2022 11:00 AM        BUN                      11                  2022 11:00 AM        CREATININE               0.8                 2022 11:00 AM        GLUCOSE                  98                  2022 11:00 AM        CALCIUM                  9.6                 2022 11:00 AM       Hyperlipidemia    he has been watching low fat diet. Reminded to keep doing regular exercise 3 to 4 days a week. Must keep trying to lose weight.    he has been tolerating cholesterol medications without any side effects. The available labs reviewed and analyzed and independent interpretation of the results explained at length.   Lab Results       Component                Value               Date                       CHOL                     168                 2022                 TRIG                     58 06/29/2022                 HDL                      52                  06/29/2022                 ALT                      20                  06/29/2022                 AST                      29                  06/29/2022              Coronary Artery Disease    Doing good with heart medication. No chest pains. No other heart symptoms. Able to exercise. Smoking    The [address] for Disease Control and Prevention states:    Tobacco use harms every organ of the body which leads to disease and disability. Tobacco use causes cancer, heart disease, stroke, and lung diseases (including emphysema, bronchitis, and chronic airway obstruction). strongly encouraged  to quit using tobacco.   You can decrease your cigarette use by half every 1-2 weeks to quit smoking in 4-8 weeks or so. You can use off and on nicotine gum or lozenges to help quit smoking. Review of Systems   Constitutional:  Negative for appetite change, chills, fever and unexpected weight change. HENT:  Negative for congestion, ear discharge, ear pain, nosebleeds, rhinorrhea, sinus pressure, sinus pain, sore throat and trouble swallowing. Eyes:  Negative for pain and discharge. Respiratory:  Negative for cough, chest tightness, shortness of breath and wheezing. Cardiovascular:  Positive for leg swelling (w discomfort). Negative for chest pain and palpitations. Gastrointestinal:  Negative for abdominal pain, blood in stool, nausea and vomiting. Endocrine: Negative for polydipsia and polyphagia. Genitourinary:  Negative for difficulty urinating, enuresis, flank pain and hematuria. Musculoskeletal:  Negative for myalgias. Skin:  Negative for rash. Neurological:  Negative for facial asymmetry, weakness, light-headedness, numbness and headaches. Psychiatric/Behavioral:  Negative for confusion.       Current Outpatient Medications on File Prior to Visit   Medication Sig Dispense Refill    atenolol (TENORMIN) 50 MG tablet TAKE 1 TABLET BY MOUTH DAILY 90 tablet 1    aspirin (ASPIRIN CHILDRENS) 81 MG chewable tablet Take 1 tablet by mouth daily 30 tablet 3    lisinopril (PRINIVIL;ZESTRIL) 20 MG tablet TAKE ONE TABLET BY MOUTH DAILY 90 tablet 3     No current facility-administered medications on file prior to visit. Past Medical History:   Diagnosis Date    Hepatitis C     History of meniscal tear     Right knee     Hyperlipidemia     Hypertension     Impaired fasting glucose       Social History     Tobacco Use    Smoking status: Every Day     Packs/day: 0.50     Years: 30.00     Pack years: 15.00     Types: Cigarettes    Smokeless tobacco: Never   Substance Use Topics    Alcohol use: Yes     Comment: 2vodka/soda or wine 2 glasses per week. no beers      Family History   Problem Relation Age of Onset    Other Father 80        , pneumonia. Other Mother         Alive, 80, in good health. Vitals:    22 1245   BP: 126/68   Site: Left Upper Arm   Weight: 236 lb (107 kg)   Height: 6' 2\" (1.88 m)     Estimated body mass index is 30.3 kg/m² as calculated from the following:    Height as of this encounter: 6' 2\" (1.88 m). Weight as of this encounter: 236 lb (107 kg). Physical Exam  Vitals and nursing note reviewed. Constitutional:       General: He is not in acute distress. HENT:      Head: Normocephalic and atraumatic. Right Ear: External ear normal.      Left Ear: External ear normal.      Nose: Nose normal.   Eyes:      General: Lids are normal.      Conjunctiva/sclera: Conjunctivae normal.      Pupils: Pupils are equal, round, and reactive to light. Neck:      Thyroid: No thyromegaly. Vascular: No JVD. Trachea: No tracheal deviation. Cardiovascular:      Rate and Rhythm: Normal rate and regular rhythm. Heart sounds: Normal heart sounds. No gallop. Pulmonary:      Effort: Pulmonary effort is normal. No respiratory distress. Breath sounds: Normal breath sounds.  No wheezing or rales. Abdominal:      General: Bowel sounds are normal.      Palpations: Abdomen is soft. There is no mass. Tenderness: There is no abdominal tenderness. Musculoskeletal:         General: No tenderness. Cervical back: Neck supple. Comments: B/l leg edema with discomfort with ankle swollen     no calf tenderness   Lymphadenopathy:      Cervical: No cervical adenopathy. Skin:     General: Skin is warm and dry. Findings: No rash. Neurological:      General: No focal deficit present. Mental Status: He is alert and oriented to person, place, and time. Cranial Nerves: No cranial nerve deficit. Sensory: No sensory deficit. Psychiatric:         Mood and Affect: Mood normal.         Behavior: Behavior normal.         Thought Content: Thought content normal.         Judgment: Judgment normal.       ASSESSMENT/PLAN:  1. Atherosclerosis of native coronary artery of native heart without angina pectoris    - Samira Andino MD, Cardiology, [facility]    2. Essential hypertension  Low-sodium diet    3. Pure hypercholesterolemia    - atorvastatin (LIPITOR) 40 MG tablet; Take 1 tablet by mouth in the morning. Dispense: 90 tablet; Refill: 0    4. Alcoholic cirrhosis of liver without ascites (HCC)    - Samira Andino MD, Cardiology, [facility]    5. Pain and swelling of lower extremity, unspecified laterality    - VL DUP LOWER EXTREMITY VENOUS BILATERAL; Future  [facility]  - spironolactone (ALDACTONE) 25 MG tablet; Take 1 tablet by mouth in the morning. Dispense: 30 tablet; Refill: 0  - Basic Metabolic Panel; Future    6. Chronic pain of both knees    - Chloe - Elaine Carrera MD, Orthopedic Surgery, [facility]    7. Cigarette nicotine dependence without complication  Wean off smoking    Return in about 3 weeks (around 8/23/2022) for spironolactone.      Patient Instructions   Do the blood work a day before 3 weeks follow-up. This new medication spironolactone 25 mg every day. Call [phone] to schedule ultrasound of the both legs    Call and see the cardiologist.    Call and do egd/colon soon w Dr Maryland Dubin off alcohol    Wean off smoking    Increase atorvastatin 40mg a day    See ortho    Strongly encouraged to quit using tobacco.   You can decrease your cigarette use by half every 1-2 weeks to quit smoking in 4-8 weeks or so. You can use off and on nicotine gum or lozenges to help quit smoking. The [address] for Disease Control and Prevention states:    Tobacco use harms every organ of the body which leads to disease and disability. Tobacco use causes cancer, heart disease, stroke, and lung diseases (including emphysema, bronchitis, and chronic airway obstruction). We have many other ways to help you quit using tobacco.    We suggest this website:  www.smokefree. gov  You might also like this cell phone text message program.  Text message charges apply on your cell phone plan. Text the word QUIT to ILIANAUIT to get started. This program offers free telephone counseling. Dial -800-QUIT-Now   Hypertension    Extensive counseling done to keep low sodium diet and  Avoid potato chips, pretzels, sauerkraut , ham , sausage, kelley , salty crackers , salty french fries, salty nuts, salty popcorn etc.  Use only low sodium soups. No salted canned vegetables. Use fresh or frozen vegetables. No salt shaker use. May use Mrs. Shaw as a salt substitute. Avoid weight gain. Regular exercise program.  Keep taking blood pressure medications regularly. If blood pressure staying above 130/85 or having side effects with blood pressure medications, then bring the blood pressure and pulse recorded twice a day to an appointment sooner than scheduled one. Hyperlipidemia    Explained -- to keep least cheese butter or fried food. Grilled baked or broiled meats. No breaded meats.   Keep trying to get to a healthy weight. Keep regular exercise program.  Keep taking cholesterol lowering medication regularly. Discussed use, benefit, and side effects of prescribed medications. Barriers to compliance discussed. All patient questions answered. Pt voiced understanding. IF YOU NEED A PRESCRIPTION REFILL, THEN PLEASE GIVE US THREE WORKING DAYS TO REFILL A PRESCRIPTION. May go to urgent care or Emergency room or call to be seen in the office sooner than scheduled follow-up appointment,if condition worsens. Electronically signed by Charisse Toussaint MD on 8/2/2022 at 1:14 PM     This dictation was generated by voice recognition computer software. Although all attempts are made to edit the dictation for accuracy, there may be errors in the transcription that are not intended.

## 2022-08-02 NOTE — PATIENT INSTRUCTIONS
get to a healthy weight. Keep regular exercise program.  Keep taking cholesterol lowering medication regularly. Discussed use, benefit, and side effects of prescribed medications. Barriers to compliance discussed. All patient questions answered. Pt voiced understanding. IF YOU NEED A PRESCRIPTION REFILL, THEN PLEASE GIVE US THREE WORKING DAYS TO REFILL A PRESCRIPTION. May go to urgent care or Emergency room or call to be seen in the office sooner than scheduled follow-up appointment,if condition worsens.

## 2022-08-03 DIAGNOSIS — M79.89 PAIN AND SWELLING OF LOWER EXTREMITY, UNSPECIFIED LATERALITY: ICD-10-CM

## 2022-08-03 DIAGNOSIS — M79.606 PAIN AND SWELLING OF LOWER EXTREMITY, UNSPECIFIED LATERALITY: ICD-10-CM

## 2022-08-04 RX ORDER — SPIRONOLACTONE 25 MG/1
25 TABLET ORAL DAILY
Qty: 90 TABLET | OUTPATIENT
Start: 2022-08-04

## 2022-08-23 ENCOUNTER — HOSPITAL ENCOUNTER (OUTPATIENT)
Age: 72
Discharge: HOME | End: 2022-08-23
Payer: MEDICARE

## 2022-08-23 ENCOUNTER — HOSPITAL ENCOUNTER (OUTPATIENT)
Dept: GENERAL RADIOLOGY | Age: 72
Discharge: HOME | End: 2022-08-23
Payer: MEDICARE

## 2022-08-23 ENCOUNTER — OFFICE VISIT (OUTPATIENT)
Dept: INTERNAL MEDICINE CLINIC | Age: 72
End: 2022-08-23
Payer: MEDICARE

## 2022-08-23 VITALS
HEIGHT: 74 IN | WEIGHT: 236 LBS | SYSTOLIC BLOOD PRESSURE: 123 MMHG | HEART RATE: 71 BPM | DIASTOLIC BLOOD PRESSURE: 73 MMHG | BODY MASS INDEX: 30.29 KG/M2

## 2022-08-23 DIAGNOSIS — M79.642 LEFT HAND PAIN: ICD-10-CM

## 2022-08-23 DIAGNOSIS — M79.642 LEFT HAND PAIN: Primary | ICD-10-CM

## 2022-08-23 DIAGNOSIS — E78.00 PURE HYPERCHOLESTEROLEMIA: ICD-10-CM

## 2022-08-23 DIAGNOSIS — I10 ESSENTIAL HYPERTENSION: ICD-10-CM

## 2022-08-23 DIAGNOSIS — K70.30 ALCOHOLIC CIRRHOSIS OF LIVER WITHOUT ASCITES (HCC): ICD-10-CM

## 2022-08-23 PROCEDURE — 99214 OFFICE O/P EST MOD 30 MIN: CPT | Performed by: INTERNAL MEDICINE

## 2022-08-23 PROCEDURE — G8417 CALC BMI ABV UP PARAM F/U: HCPCS | Performed by: INTERNAL MEDICINE

## 2022-08-23 PROCEDURE — G8427 DOCREV CUR MEDS BY ELIG CLIN: HCPCS | Performed by: INTERNAL MEDICINE

## 2022-08-23 PROCEDURE — 4004F PT TOBACCO SCREEN RCVD TLK: CPT | Performed by: INTERNAL MEDICINE

## 2022-08-23 PROCEDURE — 73130 X-RAY EXAM OF HAND: CPT

## 2022-08-23 PROCEDURE — 1123F ACP DISCUSS/DSCN MKR DOCD: CPT | Performed by: INTERNAL MEDICINE

## 2022-08-23 PROCEDURE — 3017F COLORECTAL CA SCREEN DOC REV: CPT | Performed by: INTERNAL MEDICINE

## 2022-08-23 ASSESSMENT — ENCOUNTER SYMPTOMS
EYE DISCHARGE: 0
SHORTNESS OF BREATH: 0
CHEST TIGHTNESS: 0
RHINORRHEA: 0
WHEEZING: 0
TROUBLE SWALLOWING: 0
VOMITING: 0
EYE PAIN: 0
NAUSEA: 0
SINUS PAIN: 0
BLOOD IN STOOL: 0
COUGH: 0
ABDOMINAL PAIN: 0
SORE THROAT: 0
SINUS PRESSURE: 0

## 2022-08-23 NOTE — PATIENT INSTRUCTIONS
X-ray and blood work right now. Diclofenac gel twice a day    Limit alcohol / seafood. Hypertension    Extensive counseling done to keep low sodium diet and  Avoid potato chips, pretzels, sauerkraut , ham , sausage, kelley , salty crackers , salty french fries, salty nuts, salty popcorn etc.  Use only low sodium soups. No salted canned vegetables. Use fresh or frozen vegetables. No salt shaker use. May use Mrs. Shaw as a salt substitute. Avoid weight gain. Regular exercise program.  Keep taking blood pressure medications regularly. If blood pressure staying above 130/85 or having side effects with blood pressure medications, then bring the blood pressure and pulse recorded twice a day to an appointment sooner than scheduled one. Hyperlipidemia    Explained -- to keep least cheese butter or fried food. Grilled baked or broiled meats. No breaded meats. Keep trying to get to a healthy weight. Keep regular exercise program.  Keep taking cholesterol lowering medication regularly. Discussed use, benefit, and side effects of prescribed medications. Barriers to compliance discussed. All patient questions answered. Pt voiced understanding. IF YOU NEED A PRESCRIPTION REFILL, THEN PLEASE GIVE US THREE WORKING DAYS TO REFILL A PRESCRIPTION. May go to urgent care or Emergency room or call to be seen in the office sooner than scheduled follow-up appointment,if condition worsens.

## 2022-08-23 NOTE — PROGRESS NOTES
2022     Latoya Gallardo (: 1950) is a 71 y.o. male, here for evaluation of the following medical concerns:    Chief Complaint   Patient presents with    Follow-up     edema        Digging  in garden-- hurt L hand -2wk ago--4d later still-swollen--pain--urgent care --cellulitis--Atb --cephalexin bid x 1 wk--finished last Friday--not as hot/red  and swelling down. He has not cut back on his alcohol or smoking understanding the risk. And not sure if he had eaten a lot of seafood or not before this episode. Hypertension    Watching low-sodium diet. Taking blood pressure medications regularly. Blood pressure checked off and on and trying to keep a goal of blood pressure less than 130/85 most of the time. Denies any chest pain / palpitation / shortness of breath / lightheadedness etc.   The available labs reviewed and analyzed and independent interpretation of the results explained at length. Lab Results       Component                Value               Date/Time                  NA                       139                 2022 11:00 AM        K                        4.8                 2022 11:00 AM        CL                       102                 2022 11:00 AM        CO2                      21                  2022 11:00 AM        BUN                      11                  2022 11:00 AM        CREATININE               0.8                 2022 11:00 AM        GLUCOSE                  98                  2022 11:00 AM        CALCIUM                  9.6                 2022 11:00 AM       Hyperlipidemia    he has been watching low fat diet. Reminded to keep doing regular exercise 3 to 4 days a week. Must keep trying to lose weight.    he has been tolerating cholesterol medications without any side effects. The available labs reviewed and analyzed and independent interpretation of the results explained at length.   Lab Results       Component                Value               Date                       CHOL                     168                 06/29/2022                 TRIG                     58                  06/29/2022                 HDL                      52                  06/29/2022                 ALT                      20                  06/29/2022                 AST                      29                  06/29/2022                  Review of Systems   Constitutional:  Negative for appetite change, chills, fever and unexpected weight change. HENT:  Negative for congestion, ear discharge, ear pain, nosebleeds, rhinorrhea, sinus pressure, sinus pain, sore throat and trouble swallowing. Eyes:  Negative for pain and discharge. Respiratory:  Negative for cough, chest tightness, shortness of breath and wheezing. Cardiovascular:  Negative for chest pain, palpitations and leg swelling. Gastrointestinal:  Negative for abdominal pain, blood in stool, nausea and vomiting. Endocrine: Negative for polydipsia and polyphagia. Genitourinary:  Negative for difficulty urinating, enuresis, flank pain and hematuria. Musculoskeletal:  Negative for myalgias. Left hand index finger MCP joint swollen and painful   Skin:  Negative for rash. Neurological:  Negative for facial asymmetry, weakness, light-headedness, numbness and headaches. Psychiatric/Behavioral:  Negative for confusion. Current Outpatient Medications on File Prior to Visit   Medication Sig Dispense Refill    atorvastatin (LIPITOR) 40 MG tablet Take 1 tablet by mouth in the morning. 90 tablet 0    spironolactone (ALDACTONE) 25 MG tablet Take 1 tablet by mouth in the morning.  30 tablet 0    atenolol (TENORMIN) 50 MG tablet TAKE 1 TABLET BY MOUTH DAILY 90 tablet 1    aspirin (ASPIRIN CHILDRENS) 81 MG chewable tablet Take 1 tablet by mouth daily 30 tablet 3    lisinopril (PRINIVIL;ZESTRIL) 20 MG tablet TAKE ONE TABLET BY MOUTH DAILY 90 tablet 3     No current facility-administered medications on file prior to visit. Past Medical History:   Diagnosis Date    Hepatitis C     History of meniscal tear     Right knee     Hyperlipidemia     Hypertension     Impaired fasting glucose       Social History     Tobacco Use    Smoking status: Every Day     Packs/day: 0.50     Years: 30.00     Pack years: 15.00     Types: Cigarettes    Smokeless tobacco: Never   Substance Use Topics    Alcohol use: Yes     Comment: 2vodka/soda or wine 2 glasses per week. no beers      Family History   Problem Relation Age of Onset    Other Father 80        , pneumonia. Other Mother         Alive, 80, in good health. Vitals:    22 1247   BP: 123/73   Pulse: 71   Weight: 236 lb (107 kg)   Height: 6' 2\" (1.88 m)     Estimated body mass index is 30.3 kg/m² as calculated from the following:    Height as of this encounter: 6' 2\" (1.88 m). Weight as of this encounter: 236 lb (107 kg). Physical Exam  Vitals and nursing note reviewed. Constitutional:       General: He is not in acute distress. HENT:      Head: Normocephalic and atraumatic. Right Ear: External ear normal.      Left Ear: External ear normal.   Eyes:      General: Lids are normal.      Conjunctiva/sclera: Conjunctivae normal.      Pupils: Pupils are equal, round, and reactive to light. Neck:      Thyroid: No thyromegaly. Vascular: No JVD. Trachea: No tracheal deviation. Cardiovascular:      Rate and Rhythm: Normal rate and regular rhythm. Heart sounds: Normal heart sounds. No gallop. Pulmonary:      Effort: Pulmonary effort is normal. No respiratory distress. Breath sounds: Normal breath sounds. No wheezing or rales. Abdominal:      General: Bowel sounds are normal.      Palpations: Abdomen is soft. There is no mass. Tenderness: There is no abdominal tenderness. Musculoskeletal:         General: No tenderness. Cervical back: Neck supple. Comments: No leg edema or calf tenderness    Left hand index finger MCP joint swollen and painful   Lymphadenopathy:      Cervical: No cervical adenopathy. Skin:     General: Skin is warm and dry. Findings: No rash. Neurological:      Mental Status: He is alert and oriented to person, place, and time. Cranial Nerves: No cranial nerve deficit. Sensory: No sensory deficit. Psychiatric:         Behavior: Behavior normal.         Thought Content: Thought content normal.       ASSESSMENT/PLAN:  1. Left hand pain    - XR HAND LEFT (2 VIEWS); Future  - CBC with Auto Differential; Future  - Sedimentation Rate; Future  - Comprehensive Metabolic Panel; Future  - Uric Acid; Future  - diclofenac sodium (VOLTAREN) 1 % GEL; Apply 1 g topically in the morning and 1 g in the evening. Dispense: 50 g; Refill: 1    2. Essential hypertension  meds    3. Pure hypercholesterolemia  meds    4. Alcoholic cirrhosis of liver without ascites (HCC)  Limit alcohol    Return in about 5 weeks (around 9/30/2022) for blood pressure, high cholesterol. Patient Instructions   X-ray and blood work right now. Diclofenac gel twice a day    Limit alcohol / seafood. Hypertension    Extensive counseling done to keep low sodium diet and  Avoid potato chips, pretzels, sauerkraut , ham , sausage, kelley , salty crackers , salty french fries, salty nuts, salty popcorn etc.  Use only low sodium soups. No salted canned vegetables. Use fresh or frozen vegetables. No salt shaker use. May use Mrs. Shaw as a salt substitute. Avoid weight gain. Regular exercise program.  Keep taking blood pressure medications regularly. If blood pressure staying above 130/85 or having side effects with blood pressure medications, then bring the blood pressure and pulse recorded twice a day to an appointment sooner than scheduled one. Hyperlipidemia    Explained -- to keep least cheese butter or fried food. Grilled baked or broiled meats.   No breaded meats. Keep trying to get to a healthy weight. Keep regular exercise program.  Keep taking cholesterol lowering medication regularly. Discussed use, benefit, and side effects of prescribed medications. Barriers to compliance discussed. All patient questions answered. Pt voiced understanding. IF YOU NEED A PRESCRIPTION REFILL, THEN PLEASE GIVE US THREE WORKING DAYS TO REFILL A PRESCRIPTION. May go to urgent care or Emergency room or call to be seen in the office sooner than scheduled follow-up appointment,if condition worsens. Electronically signed by Sally Chowdhury MD on 8/23/2022 at 1:19 PM     This dictation was generated by voice recognition computer software. Although all attempts are made to edit the dictation for accuracy, there may be errors in the transcription that are not intended.

## 2022-08-24 ENCOUNTER — TELEPHONE (OUTPATIENT)
Dept: INTERNAL MEDICINE CLINIC | Age: 72
End: 2022-08-24

## 2022-09-01 DIAGNOSIS — M79.606 PAIN AND SWELLING OF LOWER EXTREMITY, UNSPECIFIED LATERALITY: ICD-10-CM

## 2022-09-01 DIAGNOSIS — M79.642 LEFT HAND PAIN: ICD-10-CM

## 2022-09-01 DIAGNOSIS — M79.89 PAIN AND SWELLING OF LOWER EXTREMITY, UNSPECIFIED LATERALITY: ICD-10-CM

## 2022-09-01 LAB
A/G RATIO: 1.5 (ref 1.1–2.2)
ALBUMIN SERPL-MCNC: 4.5 G/DL (ref 3.4–5)
ALP BLD-CCNC: 102 U/L (ref 40–129)
ALT SERPL-CCNC: 17 U/L (ref 10–40)
ANION GAP SERPL CALCULATED.3IONS-SCNC: 13 MMOL/L (ref 3–16)
AST SERPL-CCNC: 26 U/L (ref 15–37)
BASOPHILS ABSOLUTE: 0 K/UL (ref 0–0.2)
BASOPHILS RELATIVE PERCENT: 0.4 %
BILIRUB SERPL-MCNC: 0.7 MG/DL (ref 0–1)
BUN BLDV-MCNC: 7 MG/DL (ref 7–20)
CALCIUM SERPL-MCNC: 9.2 MG/DL (ref 8.3–10.6)
CHLORIDE BLD-SCNC: 99 MMOL/L (ref 99–110)
CO2: 21 MMOL/L (ref 21–32)
CREAT SERPL-MCNC: 0.8 MG/DL (ref 0.8–1.3)
EOSINOPHILS ABSOLUTE: 0.3 K/UL (ref 0–0.6)
EOSINOPHILS RELATIVE PERCENT: 4.5 %
GFR AFRICAN AMERICAN: >60
GFR NON-AFRICAN AMERICAN: >60
GLUCOSE BLD-MCNC: 122 MG/DL (ref 70–99)
HCT VFR BLD CALC: 37.2 % (ref 40.5–52.5)
HEMOGLOBIN: 12.9 G/DL (ref 13.5–17.5)
LYMPHOCYTES ABSOLUTE: 2.3 K/UL (ref 1–5.1)
LYMPHOCYTES RELATIVE PERCENT: 33.4 %
MCH RBC QN AUTO: 30.8 PG (ref 26–34)
MCHC RBC AUTO-ENTMCNC: 34.8 G/DL (ref 31–36)
MCV RBC AUTO: 88.6 FL (ref 80–100)
MONOCYTES ABSOLUTE: 0.5 K/UL (ref 0–1.3)
MONOCYTES RELATIVE PERCENT: 7.4 %
NEUTROPHILS ABSOLUTE: 3.8 K/UL (ref 1.7–7.7)
NEUTROPHILS RELATIVE PERCENT: 54.3 %
PDW BLD-RTO: 15.3 % (ref 12.4–15.4)
PLATELET # BLD: 171 K/UL (ref 135–450)
PMV BLD AUTO: 7.8 FL (ref 5–10.5)
POTASSIUM SERPL-SCNC: 4.4 MMOL/L (ref 3.5–5.1)
RBC # BLD: 4.2 M/UL (ref 4.2–5.9)
SEDIMENTATION RATE, ERYTHROCYTE: 16 MM/HR (ref 0–20)
SODIUM BLD-SCNC: 133 MMOL/L (ref 136–145)
TOTAL PROTEIN: 7.5 G/DL (ref 6.4–8.2)
URIC ACID, SERUM: 9.5 MG/DL (ref 3.5–7.2)
WBC # BLD: 7 K/UL (ref 4–11)

## 2022-09-02 DIAGNOSIS — M1A.0710 CHRONIC GOUT OF RIGHT FOOT, UNSPECIFIED CAUSE: Primary | ICD-10-CM

## 2022-09-02 RX ORDER — COLCHICINE 0.6 MG/1
0.6 TABLET ORAL DAILY
Qty: 7 TABLET | Refills: 0 | Status: SHIPPED | OUTPATIENT
Start: 2022-09-02 | End: 2022-11-01 | Stop reason: ALTCHOICE

## 2022-09-02 RX ORDER — ALLOPURINOL 300 MG/1
300 TABLET ORAL DAILY
Qty: 30 TABLET | Refills: 0 | Status: SHIPPED | OUTPATIENT
Start: 2022-09-02 | End: 2022-10-18

## 2022-09-04 DIAGNOSIS — M79.89 PAIN AND SWELLING OF LOWER EXTREMITY, UNSPECIFIED LATERALITY: ICD-10-CM

## 2022-09-04 DIAGNOSIS — M79.606 PAIN AND SWELLING OF LOWER EXTREMITY, UNSPECIFIED LATERALITY: ICD-10-CM

## 2022-09-06 DIAGNOSIS — M79.606 PAIN AND SWELLING OF LOWER EXTREMITY, UNSPECIFIED LATERALITY: ICD-10-CM

## 2022-09-06 DIAGNOSIS — M79.89 PAIN AND SWELLING OF LOWER EXTREMITY, UNSPECIFIED LATERALITY: ICD-10-CM

## 2022-09-06 RX ORDER — SPIRONOLACTONE 25 MG/1
25 TABLET ORAL DAILY
Qty: 30 TABLET | Refills: 0 | Status: SHIPPED | OUTPATIENT
Start: 2022-09-06 | End: 2022-10-05

## 2022-09-07 RX ORDER — SPIRONOLACTONE 25 MG/1
25 TABLET ORAL DAILY
Qty: 90 TABLET | OUTPATIENT
Start: 2022-09-07

## 2022-09-25 DIAGNOSIS — M1A.0710 CHRONIC GOUT OF RIGHT FOOT, UNSPECIFIED CAUSE: ICD-10-CM

## 2022-09-26 DIAGNOSIS — I10 ESSENTIAL HYPERTENSION: ICD-10-CM

## 2022-09-26 RX ORDER — LISINOPRIL 20 MG/1
TABLET ORAL
Qty: 30 TABLET | Refills: 0 | Status: SHIPPED | OUTPATIENT
Start: 2022-09-26 | End: 2022-11-01 | Stop reason: SDUPTHER

## 2022-09-26 RX ORDER — COLCHICINE 0.6 MG/1
TABLET ORAL
Qty: 7 TABLET | Refills: 0 | OUTPATIENT
Start: 2022-09-26

## 2022-09-30 ENCOUNTER — TELEPHONE (OUTPATIENT)
Dept: CASE MANAGEMENT | Age: 72
End: 2022-09-30

## 2022-09-30 NOTE — TELEPHONE ENCOUNTER
Patient due for annual CT Lung Screening. Reminder letter mailed.       Gavin GARCIAN, RN   Lung Nodule Navigator  [facility]  [phone]

## 2022-10-05 DIAGNOSIS — M79.89 PAIN AND SWELLING OF LOWER EXTREMITY, UNSPECIFIED LATERALITY: ICD-10-CM

## 2022-10-05 DIAGNOSIS — M79.606 PAIN AND SWELLING OF LOWER EXTREMITY, UNSPECIFIED LATERALITY: ICD-10-CM

## 2022-10-05 RX ORDER — SPIRONOLACTONE 25 MG/1
25 TABLET ORAL DAILY
Qty: 30 TABLET | Refills: 0 | Status: SHIPPED | OUTPATIENT
Start: 2022-10-05 | End: 2022-10-05

## 2022-10-05 RX ORDER — SPIRONOLACTONE 25 MG/1
25 TABLET ORAL DAILY
Qty: 90 TABLET | Refills: 0 | Status: SHIPPED | OUTPATIENT
Start: 2022-10-05 | End: 2022-11-01 | Stop reason: SDUPTHER

## 2022-10-18 ENCOUNTER — TELEPHONE (OUTPATIENT)
Dept: CASE MANAGEMENT | Age: 72
End: 2022-10-18

## 2022-10-18 DIAGNOSIS — M1A.0710 CHRONIC GOUT OF RIGHT FOOT, UNSPECIFIED CAUSE: ICD-10-CM

## 2022-10-18 RX ORDER — ALLOPURINOL 300 MG/1
TABLET ORAL
Qty: 30 TABLET | Refills: 0 | Status: SHIPPED | OUTPATIENT
Start: 2022-10-18 | End: 2022-11-01 | Stop reason: SDUPTHER

## 2022-10-18 NOTE — TELEPHONE ENCOUNTER
Dr. Brizuela Enter,    Patient due for annual CT Lung screening. If you would like patient to have screening, please place order for CT Lung screening ([facility] [id number]). I will contact patient to help schedule after order entered.     Thanks,  Lory GARCIAN, RN   Lung Nodule Navigator  [facility]  [phone]

## 2022-10-18 NOTE — TELEPHONE ENCOUNTER
He was supposed to have follow-up scheduled end of September and he did not keep it. He needs to return to office.

## 2022-10-27 DIAGNOSIS — I10 ESSENTIAL HYPERTENSION: ICD-10-CM

## 2022-10-27 DIAGNOSIS — E78.00 PURE HYPERCHOLESTEROLEMIA: ICD-10-CM

## 2022-10-27 RX ORDER — ATORVASTATIN CALCIUM 20 MG/1
20 TABLET, FILM COATED ORAL DAILY
Qty: 90 TABLET | Refills: 0 | OUTPATIENT
Start: 2022-10-27

## 2022-10-28 RX ORDER — ATENOLOL 50 MG/1
50 TABLET ORAL DAILY
Qty: 90 TABLET | Refills: 1 | Status: SHIPPED | OUTPATIENT
Start: 2022-10-28

## 2022-11-01 ENCOUNTER — OFFICE VISIT (OUTPATIENT)
Dept: INTERNAL MEDICINE CLINIC | Age: 72
End: 2022-11-01

## 2022-11-01 VITALS
HEART RATE: 77 BPM | OXYGEN SATURATION: 98 % | WEIGHT: 244.2 LBS | DIASTOLIC BLOOD PRESSURE: 86 MMHG | SYSTOLIC BLOOD PRESSURE: 138 MMHG | TEMPERATURE: 97.4 F | BODY MASS INDEX: 31.34 KG/M2 | HEIGHT: 74 IN

## 2022-11-01 DIAGNOSIS — E78.00 PURE HYPERCHOLESTEROLEMIA: ICD-10-CM

## 2022-11-01 DIAGNOSIS — M79.89 PAIN AND SWELLING OF LOWER EXTREMITY, UNSPECIFIED LATERALITY: ICD-10-CM

## 2022-11-01 DIAGNOSIS — D64.9 ANEMIA, UNSPECIFIED TYPE: ICD-10-CM

## 2022-11-01 DIAGNOSIS — M79.606 PAIN AND SWELLING OF LOWER EXTREMITY, UNSPECIFIED LATERALITY: ICD-10-CM

## 2022-11-01 DIAGNOSIS — M1A.0710 CHRONIC GOUT OF RIGHT FOOT, UNSPECIFIED CAUSE: ICD-10-CM

## 2022-11-01 DIAGNOSIS — R63.5 WEIGHT GAIN: ICD-10-CM

## 2022-11-01 DIAGNOSIS — K70.30 ALCOHOLIC CIRRHOSIS OF LIVER WITHOUT ASCITES (HCC): ICD-10-CM

## 2022-11-01 DIAGNOSIS — I10 ESSENTIAL HYPERTENSION: Primary | ICD-10-CM

## 2022-11-01 DIAGNOSIS — M1A.0720 CHRONIC GOUT OF LEFT FOOT, UNSPECIFIED CAUSE: ICD-10-CM

## 2022-11-01 DIAGNOSIS — F17.210 CIGARETTE NICOTINE DEPENDENCE WITHOUT COMPLICATION: ICD-10-CM

## 2022-11-01 DIAGNOSIS — R73.9 HYPERGLYCEMIA: ICD-10-CM

## 2022-11-01 PROCEDURE — 3074F SYST BP LT 130 MM HG: CPT | Performed by: INTERNAL MEDICINE

## 2022-11-01 PROCEDURE — G8427 DOCREV CUR MEDS BY ELIG CLIN: HCPCS | Performed by: INTERNAL MEDICINE

## 2022-11-01 PROCEDURE — G8484 FLU IMMUNIZE NO ADMIN: HCPCS | Performed by: INTERNAL MEDICINE

## 2022-11-01 PROCEDURE — 4004F PT TOBACCO SCREEN RCVD TLK: CPT | Performed by: INTERNAL MEDICINE

## 2022-11-01 PROCEDURE — 3288F FALL RISK ASSESSMENT DOCD: CPT | Performed by: INTERNAL MEDICINE

## 2022-11-01 PROCEDURE — 3017F COLORECTAL CA SCREEN DOC REV: CPT | Performed by: INTERNAL MEDICINE

## 2022-11-01 PROCEDURE — 1123F ACP DISCUSS/DSCN MKR DOCD: CPT | Performed by: INTERNAL MEDICINE

## 2022-11-01 PROCEDURE — G8417 CALC BMI ABV UP PARAM F/U: HCPCS | Performed by: INTERNAL MEDICINE

## 2022-11-01 PROCEDURE — 3078F DIAST BP <80 MM HG: CPT | Performed by: INTERNAL MEDICINE

## 2022-11-01 PROCEDURE — 99214 OFFICE O/P EST MOD 30 MIN: CPT | Performed by: INTERNAL MEDICINE

## 2022-11-01 RX ORDER — LISINOPRIL 20 MG/1
TABLET ORAL
Qty: 90 TABLET | Refills: 0 | Status: SHIPPED | OUTPATIENT
Start: 2022-11-01

## 2022-11-01 RX ORDER — SPIRONOLACTONE 25 MG/1
25 TABLET ORAL DAILY
Qty: 90 TABLET | Refills: 0 | Status: SHIPPED | OUTPATIENT
Start: 2022-11-01

## 2022-11-01 RX ORDER — ATORVASTATIN CALCIUM 40 MG/1
40 TABLET, FILM COATED ORAL DAILY
Qty: 90 TABLET | Refills: 0 | Status: SHIPPED | OUTPATIENT
Start: 2022-11-01

## 2022-11-01 RX ORDER — ALLOPURINOL 300 MG/1
300 TABLET ORAL DAILY
Qty: 90 TABLET | Refills: 0 | Status: SHIPPED | OUTPATIENT
Start: 2022-11-01

## 2022-11-01 ASSESSMENT — ENCOUNTER SYMPTOMS
SINUS PAIN: 0
WHEEZING: 0
NAUSEA: 0
TROUBLE SWALLOWING: 0
SORE THROAT: 0
BLOOD IN STOOL: 0
SINUS PRESSURE: 0
SHORTNESS OF BREATH: 0
VOMITING: 0
ABDOMINAL PAIN: 0
EYE DISCHARGE: 0
RHINORRHEA: 0
CHEST TIGHTNESS: 0
EYE PAIN: 0
COUGH: 0

## 2022-11-01 ASSESSMENT — PATIENT HEALTH QUESTIONNAIRE - PHQ9
SUM OF ALL RESPONSES TO PHQ QUESTIONS 1-9: 0
2. FEELING DOWN, DEPRESSED OR HOPELESS: 0
SUM OF ALL RESPONSES TO PHQ QUESTIONS 1-9: 0
SUM OF ALL RESPONSES TO PHQ QUESTIONS 1-9: 0
SUM OF ALL RESPONSES TO PHQ9 QUESTIONS 1 & 2: 0
1. LITTLE INTEREST OR PLEASURE IN DOING THINGS: 0
SUM OF ALL RESPONSES TO PHQ QUESTIONS 1-9: 0

## 2022-11-01 NOTE — PROGRESS NOTES
2022     Alfreida Lanes (: 1950) is a 71 y.o. male, here for evaluation of the following medical concerns:    Chief Complaint   Patient presents with    Hypertension    Hyperlipidemia        Hypertension    Watching low-sodium diet. Taking blood pressure medications regularly. Blood pressure checked off and on and trying to keep a goal of blood pressure less than 130/85 most of the time. Denies any chest pain / palpitation / shortness of breath / lightheadedness etc.   The available labs reviewed and analyzed and independent interpretation of the results explained at length. Lab Results       Component                Value               Date/Time                  NA                       133                 2022 03:03 PM        K                        4.4                 2022 03:03 PM        CL                       99                  2022 03:03 PM        CO2                      21                  2022 03:03 PM        BUN                      7                   2022 03:03 PM        CREATININE               0.8                 2022 03:03 PM        GLUCOSE                  122                 2022 03:03 PM        CALCIUM                  9.2                 2022 03:03 PM       Hyperlipidemia    he has been watching low fat diet. Reminded to keep doing regular exercise 3 to 4 days a week. Must keep trying to lose weight.    he has been tolerating cholesterol medications without any side effects. The available labs reviewed and analyzed and independent interpretation of the results explained at length.   Lab Results       Component                Value               Date                       CHOL                     168                 2022                 TRIG                     58                  2022                 HDL                      52                  2022                 ALT                      17 09/01/2022                 AST                      26                  09/01/2022                Gout--doing fine--Hand and L foot    Alcohol cut back Quite a bit    Explained at length that overweight is not good for knee and hip joints. Wt gain  Overweight puts us at increased risk for high blood pressure and diabetes risk and must keep trying to get to ideal body weight with Body Mass Index less than 25. Smoking    The [address] for Disease Control and Prevention states:    Tobacco use harms every organ of the body which leads to disease and disability. Tobacco use causes cancer, heart disease, stroke, and lung diseases (including emphysema, bronchitis, and chronic airway obstruction). strongly encouraged  to quit using tobacco.   You can decrease your cigarette use by half every 1-2 weeks to quit smoking in 4-8 weeks or so. You can use off and on nicotine gum or lozenges to help quit smoking. Review of Systems   Constitutional:  Negative for appetite change, chills, fever and unexpected weight change. HENT:  Negative for congestion, ear discharge, ear pain, nosebleeds, rhinorrhea, sinus pressure, sinus pain, sore throat and trouble swallowing. Eyes:  Negative for pain and discharge. Respiratory:  Negative for cough, chest tightness, shortness of breath and wheezing. Cardiovascular:  Negative for chest pain, palpitations and leg swelling. Gastrointestinal:  Negative for abdominal pain, blood in stool, nausea and vomiting. Endocrine: Negative for polydipsia and polyphagia. Genitourinary:  Negative for difficulty urinating, enuresis, flank pain and hematuria. Musculoskeletal:  Negative for myalgias. Skin:  Negative for rash. Neurological:  Negative for facial asymmetry, weakness, light-headedness, numbness and headaches. Psychiatric/Behavioral:  Negative for confusion.       Current Outpatient Medications on File Prior to Visit   Medication Sig Dispense Refill atenolol (TENORMIN) 50 MG tablet TAKE 1 TABLET BY MOUTH DAILY 90 tablet 1    diclofenac sodium (VOLTAREN) 1 % GEL Apply 1 g topically in the morning and 1 g in the evening. 50 g 1    aspirin (ASPIRIN CHILDRENS) 81 MG chewable tablet Take 1 tablet by mouth daily 30 tablet 3     No current facility-administered medications on file prior to visit. Past Medical History:   Diagnosis Date    Hepatitis C     History of meniscal tear     Right knee     Hyperlipidemia     Hypertension     Impaired fasting glucose       Social History     Tobacco Use    Smoking status: Every Day     Packs/day: 1.00     Years: 40.00     Pack years: 40.00     Types: Cigarettes    Smokeless tobacco: Never   Substance Use Topics    Alcohol use: Yes     Comment: 2vodka/soda or wine 2 glasses per week. no beers      Family History   Problem Relation Age of Onset    Other Father 80        , pneumonia. Other Mother         Alive, 80, in good health. Vitals:    22 1101   BP: 138/86   Site: Left Upper Arm  Comment: No BP med taken   Position: Sitting   Cuff Size: Large Adult   Pulse: 77   Temp: 97.4 °F (36.3 °C)   TempSrc: Temporal   SpO2: 98%   Weight: 244 lb 3.2 oz (110.8 kg)   Height: 6' 2\" (1.88 m)     Estimated body mass index is 31.35 kg/m² as calculated from the following:    Height as of this encounter: 6' 2\" (1.88 m). Weight as of this encounter: 244 lb 3.2 oz (110.8 kg). Physical Exam  Vitals and nursing note reviewed. Constitutional:       General: He is not in acute distress. HENT:      Head: Normocephalic and atraumatic. Right Ear: External ear normal.      Left Ear: External ear normal.   Eyes:      General: Lids are normal.      Conjunctiva/sclera: Conjunctivae normal.      Pupils: Pupils are equal, round, and reactive to light. Neck:      Thyroid: No thyromegaly. Vascular: No JVD. Trachea: No tracheal deviation.    Cardiovascular:      Rate and Rhythm: Normal rate and regular rhythm. Heart sounds: Normal heart sounds. No gallop. Pulmonary:      Effort: Pulmonary effort is normal. No respiratory distress. Breath sounds: Normal breath sounds. No wheezing or rales. Abdominal:      General: Bowel sounds are normal.      Palpations: Abdomen is soft. There is no mass. Tenderness: There is no abdominal tenderness. Musculoskeletal:         General: No tenderness. Cervical back: Neck supple. Comments: No leg edema or calf tenderness   Lymphadenopathy:      Cervical: No cervical adenopathy. Skin:     General: Skin is warm and dry. Findings: No rash. Neurological:      General: No focal deficit present. Mental Status: He is alert and oriented to person, place, and time. Cranial Nerves: No cranial nerve deficit. Sensory: No sensory deficit. Psychiatric:         Mood and Affect: Mood normal.         Behavior: Behavior normal.         Thought Content: Thought content normal.       ASSESSMENT/PLAN:  1. Essential hypertension    - lisinopril (PRINIVIL;ZESTRIL) 20 MG tablet; TAKE ONE TABLET BY MOUTH DAILY  Dispense: 90 tablet; Refill: 0  - Basic Metabolic Panel; Future    2. Pure hypercholesterolemia    - CK; Future  - Hepatic Function Panel; Future  - Lipid Panel; Future  - atorvastatin (LIPITOR) 40 MG tablet; Take 1 tablet by mouth daily  Dispense: 90 tablet; Refill: 0    3. Chronic gout of left foot, unspecified cause    - allopurinol (ZYLOPRIM) 300 MG tablet; Take 1 tablet by mouth daily  Dispense: 90 tablet; Refill: 0  - Uric Acid; Future    4. Alcoholic cirrhosis of liver without ascites ([facility] [address]  Wean off    5. Weight gain  Lose wt    6. Chronic gout of right foot, unspecified cause    - allopurinol (ZYLOPRIM) 300 MG tablet; Take 1 tablet by mouth daily  Dispense: 90 tablet; Refill: 0    7. Hyperglycemia    - Hemoglobin A1C; Future    8. Anemia, unspecified type    - CBC with Auto Differential; Future    9.  Pain and swelling of lower extremity, unspecified laterality    - spironolactone (ALDACTONE) 25 MG tablet; Take 1 tablet by mouth daily  Dispense: 90 tablet; Refill: 0    10. Cigarette nicotine dependence without complication    - CT Lung Screen (Initial or Annual); Future      Return in about 5 weeks (around 12/6/2022) for blood pressure, high cholesterol--AWV--Dr Radha El. Patient Instructions   fasting blood work tomorrow    Keep allopurinol 1 a day. Avoid gout causing foods    Try To lose weight with limiting sweets or carb or high fat foods. Egg whites or oatmeal.    Wean off alcohol    Quit smoking with nicotine lozenges    CT lung screen    Hypertension    Extensive counseling done to keep low sodium diet and  Avoid potato chips, pretzels, sauerkraut , ham , sausage, kelley , salty crackers , salty french fries, salty nuts, salty popcorn etc.  Use only low sodium soups. No salted canned vegetables. Use fresh or frozen vegetables. No salt shaker use. May use Mrs. Shaw as a salt substitute. Avoid weight gain. Regular exercise program.  Keep taking blood pressure medications regularly. If blood pressure staying above 130/85 or having side effects with blood pressure medications, then bring the blood pressure and pulse recorded twice a day to an appointment sooner than scheduled one. Hyperlipidemia    Explained -- to keep least cheese butter or fried food. Grilled baked or broiled meats. No breaded meats. Keep trying to get to a healthy weight. Keep regular exercise program.  Keep taking cholesterol lowering medication regularly. Discussed use, benefit, and side effects of prescribed medications. Barriers to compliance discussed. All patient questions answered. Pt voiced understanding. IF YOU NEED A PRESCRIPTION REFILL, THEN PLEASE GIVE US THREE WORKING DAYS TO REFILL A PRESCRIPTION.   May go to urgent care or Emergency room or call to be seen in the office sooner than scheduled follow-up appointment,if condition worsens. Please get all OVER DUE VACCINATIONS done at the pharmacy or health department as soon as possible. Electronically signed by Francisco Tyson MD on 11/1/2022 at 11:46 AM     This dictation was generated by voice recognition computer software. Although all attempts are made to edit the dictation for accuracy, there may be errors in the transcription that are not intended.

## 2022-11-01 NOTE — PATIENT INSTRUCTIONS
fasting blood work tomorrow    Keep allopurinol 1 a day. Avoid gout causing foods    Try To lose weight with limiting sweets or carb or high fat foods. Egg whites or oatmeal.    Wean off alcohol    Quit smoking with nicotine lozenges    CT lung screen    Hypertension    Extensive counseling done to keep low sodium diet and  Avoid potato chips, pretzels, sauerkraut , ham , sausage, kelley , salty crackers , salty french fries, salty nuts, salty popcorn etc.  Use only low sodium soups. No salted canned vegetables. Use fresh or frozen vegetables. No salt shaker use. May use Mrs. Shaw as a salt substitute. Avoid weight gain. Regular exercise program.  Keep taking blood pressure medications regularly. If blood pressure staying above 130/85 or having side effects with blood pressure medications, then bring the blood pressure and pulse recorded twice a day to an appointment sooner than scheduled one. Hyperlipidemia    Explained -- to keep least cheese butter or fried food. Grilled baked or broiled meats. No breaded meats. Keep trying to get to a healthy weight. Keep regular exercise program.  Keep taking cholesterol lowering medication regularly. Discussed use, benefit, and side effects of prescribed medications. Barriers to compliance discussed. All patient questions answered. Pt voiced understanding. IF YOU NEED A PRESCRIPTION REFILL, THEN PLEASE GIVE US THREE WORKING DAYS TO REFILL A PRESCRIPTION. May go to urgent care or Emergency room or call to be seen in the office sooner than scheduled follow-up appointment,if condition worsens. Please get all OVER DUE VACCINATIONS done at the pharmacy or health department as soon as possible.

## 2022-11-03 DIAGNOSIS — E78.00 PURE HYPERCHOLESTEROLEMIA: ICD-10-CM

## 2022-11-03 RX ORDER — ATORVASTATIN CALCIUM 40 MG/1
TABLET, FILM COATED ORAL
Qty: 90 TABLET | Refills: 0 | OUTPATIENT
Start: 2022-11-03

## 2022-11-28 ENCOUNTER — TELEPHONE (OUTPATIENT)
Dept: CASE MANAGEMENT | Age: 72
End: 2022-11-28

## 2022-11-28 NOTE — TELEPHONE ENCOUNTER
Pt due for annual Lung Screening CT. Order in place. Called pt to assist with scheduling. No answer- left vm.     Jakob GARCIAN, RN   Lung Nodule Navigator  [facility]  [phone]

## 2022-12-06 ENCOUNTER — TELEPHONE (OUTPATIENT)
Dept: CASE MANAGEMENT | Age: 72
End: 2022-12-06

## 2022-12-06 NOTE — TELEPHONE ENCOUNTER
Patient due for annual CT Lung Screening. Reminder letter mailed.       Apolinar Solorio BSN, RN   Lung Nodule Navigator  [facility]  [phone]

## 2022-12-29 ENCOUNTER — OFFICE VISIT (OUTPATIENT)
Dept: INTERNAL MEDICINE CLINIC | Age: 72
End: 2022-12-29
Payer: MEDICARE

## 2022-12-29 VITALS
WEIGHT: 242.8 LBS | DIASTOLIC BLOOD PRESSURE: 78 MMHG | TEMPERATURE: 98.2 F | BODY MASS INDEX: 31.17 KG/M2 | SYSTOLIC BLOOD PRESSURE: 120 MMHG

## 2022-12-29 DIAGNOSIS — R19.8 GUM SYMPTOMS: Primary | ICD-10-CM

## 2022-12-29 PROCEDURE — 4004F PT TOBACCO SCREEN RCVD TLK: CPT | Performed by: STUDENT IN AN ORGANIZED HEALTH CARE EDUCATION/TRAINING PROGRAM

## 2022-12-29 PROCEDURE — G8427 DOCREV CUR MEDS BY ELIG CLIN: HCPCS | Performed by: STUDENT IN AN ORGANIZED HEALTH CARE EDUCATION/TRAINING PROGRAM

## 2022-12-29 PROCEDURE — 99212 OFFICE O/P EST SF 10 MIN: CPT | Performed by: STUDENT IN AN ORGANIZED HEALTH CARE EDUCATION/TRAINING PROGRAM

## 2022-12-29 PROCEDURE — 3074F SYST BP LT 130 MM HG: CPT | Performed by: STUDENT IN AN ORGANIZED HEALTH CARE EDUCATION/TRAINING PROGRAM

## 2022-12-29 PROCEDURE — 3078F DIAST BP <80 MM HG: CPT | Performed by: STUDENT IN AN ORGANIZED HEALTH CARE EDUCATION/TRAINING PROGRAM

## 2022-12-29 PROCEDURE — 3017F COLORECTAL CA SCREEN DOC REV: CPT | Performed by: STUDENT IN AN ORGANIZED HEALTH CARE EDUCATION/TRAINING PROGRAM

## 2022-12-29 PROCEDURE — 1123F ACP DISCUSS/DSCN MKR DOCD: CPT | Performed by: STUDENT IN AN ORGANIZED HEALTH CARE EDUCATION/TRAINING PROGRAM

## 2022-12-29 PROCEDURE — G8484 FLU IMMUNIZE NO ADMIN: HCPCS | Performed by: STUDENT IN AN ORGANIZED HEALTH CARE EDUCATION/TRAINING PROGRAM

## 2022-12-29 PROCEDURE — G8417 CALC BMI ABV UP PARAM F/U: HCPCS | Performed by: STUDENT IN AN ORGANIZED HEALTH CARE EDUCATION/TRAINING PROGRAM

## 2022-12-29 ASSESSMENT — ENCOUNTER SYMPTOMS
ABDOMINAL DISTENTION: 0
APNEA: 0
NAUSEA: 0
ABDOMINAL PAIN: 0
CONSTIPATION: 0
SINUS PRESSURE: 0
PHOTOPHOBIA: 0
SHORTNESS OF BREATH: 0
STRIDOR: 0
CHEST TIGHTNESS: 0
CHOKING: 0
DIARRHEA: 0
SORE THROAT: 0
VOMITING: 0
VOICE CHANGE: 0
RHINORRHEA: 0
TROUBLE SWALLOWING: 0
SINUS PAIN: 0
COUGH: 0
WHEEZING: 0

## 2022-12-29 ASSESSMENT — PATIENT HEALTH QUESTIONNAIRE - PHQ9
SUM OF ALL RESPONSES TO PHQ QUESTIONS 1-9: 1
SUM OF ALL RESPONSES TO PHQ9 QUESTIONS 1 & 2: 1
SUM OF ALL RESPONSES TO PHQ QUESTIONS 1-9: 1
1. LITTLE INTEREST OR PLEASURE IN DOING THINGS: 1
SUM OF ALL RESPONSES TO PHQ QUESTIONS 1-9: 1
2. FEELING DOWN, DEPRESSED OR HOPELESS: 0
SUM OF ALL RESPONSES TO PHQ QUESTIONS 1-9: 1

## 2022-12-29 NOTE — ASSESSMENT & PLAN NOTE
Gum discomfort, on upper right   - No apparent ulcer or abrasion and no signs of infection or inflammation  - Denture seems to fit wells however a small corner of the denture is raised, which most likely cause patient discomfort sensation.   - Recommended patient to follow up with his dentist.

## 2022-12-29 NOTE — PROGRESS NOTES
Alfreida Lanes (:  1950) is a 71 y.o. male,Established patient, here for evaluation of the following chief complaint(s):  New Patient         ASSESSMENT/PLAN:  1. Gum symptoms  Assessment & Plan:  Gum discomfort, on upper right   - No apparent ulcer or abrasion and no signs of infection or inflammation  - Denture seems to fit wells however a small corner of the denture is raised, which most likely cause patient discomfort sensation.   - Recommended patient to follow up with his dentist.     Return in about 3 months (around 3/29/2023) for Routine follow-up. Subjective   SUBJECTIVE/OBJECTIVE:  HPI  Here for discomfort on his upper palate for over month. Felt more in the morning. Not painful but feel like sore throat  Wear denture every and not sure if it causes any difference  No issues with chewing or swallowing    Never had this before in the past     Review of Systems   Constitutional:  Negative for activity change, appetite change, chills, diaphoresis, fatigue, fever and unexpected weight change. HENT:  Negative for congestion, ear discharge, ear pain, postnasal drip, rhinorrhea, sinus pressure, sinus pain, sore throat, tinnitus, trouble swallowing and voice change. Eyes:  Negative for photophobia and visual disturbance. Respiratory:  Negative for apnea, cough, choking, chest tightness, shortness of breath, wheezing and stridor. Cardiovascular:  Negative for chest pain, palpitations and leg swelling. Gastrointestinal:  Negative for abdominal distention, abdominal pain, constipation, diarrhea, nausea and vomiting. Genitourinary:  Negative for difficulty urinating and dysuria. Musculoskeletal:  Negative for arthralgias and myalgias. Skin:  Negative for rash and wound. Neurological:  Negative for dizziness, weakness and light-headedness. Hematological:  Negative for adenopathy. Psychiatric/Behavioral:  Negative for agitation and behavioral problems.          Current Outpatient Medications   Medication Sig Dispense Refill    lisinopril (PRINIVIL;ZESTRIL) 20 MG tablet TAKE ONE TABLET BY MOUTH DAILY 90 tablet 0    allopurinol (ZYLOPRIM) 300 MG tablet Take 1 tablet by mouth daily 90 tablet 0    spironolactone (ALDACTONE) 25 MG tablet Take 1 tablet by mouth daily 90 tablet 0    atorvastatin (LIPITOR) 40 MG tablet Take 1 tablet by mouth daily 90 tablet 0    atenolol (TENORMIN) 50 MG tablet TAKE 1 TABLET BY MOUTH DAILY 90 tablet 1    aspirin (ASPIRIN CHILDRENS) 81 MG chewable tablet Take 1 tablet by mouth daily 30 tablet 3    diclofenac sodium (VOLTAREN) 1 % GEL Apply 1 g topically in the morning and 1 g in the evening. (Patient not taking: Reported on 12/29/2022) 50 g 1     No current facility-administered medications for this visit.        Objective     Vitals:    12/29/22 1423   BP: 120/78   Temp: 98.2 °F (36.8 °C)        Lab Results   Component Value Date    WBC 7.0 09/01/2022    HGB 12.9 (L) 09/01/2022    HCT 37.2 (L) 09/01/2022    MCV 88.6 09/01/2022     09/01/2022      Lab Results   Component Value Date     (L) 09/01/2022    K 4.4 09/01/2022    CL 99 09/01/2022    CO2 21 09/01/2022    BUN 7 09/01/2022    CREATININE 0.8 09/01/2022    GLUCOSE 122 (H) 09/01/2022    CALCIUM 9.2 09/01/2022    PROT 7.5 09/01/2022    LABALBU 4.5 09/01/2022    BILITOT 0.7 09/01/2022    ALKPHOS 102 09/01/2022    AST 26 09/01/2022    ALT 17 09/01/2022    LABGLOM >60 09/01/2022    GFRAA >60 09/01/2022    AGRATIO 1.5 09/01/2022    GLOB 3.2 10/07/2021     Lab Results   Component Value Date    CHOL 168 06/29/2022    CHOL 227 (H) 10/07/2021    CHOL 229 (H) 07/01/2014     Lab Results   Component Value Date    TRIG 58 06/29/2022    TRIG 122 10/07/2021    TRIG 126 07/01/2014     Lab Results   Component Value Date    HDL 52 06/29/2022    HDL 45 10/07/2021    HDL 36 (L) 07/01/2014     Lab Results   Component Value Date    LDLCALC 104 (H) 06/29/2022    LDLCALC 158 (H) 10/07/2021    LDLCALC 168 (H) 07/01/2014     Lab Results   Component Value Date    LABVLDL 12 06/29/2022    LABVLDL 24 10/07/2021    LABVLDL 25 07/01/2014     No results found for: CHOLHDLRATIO  No results found for: LABA1C  No results found for: EAG        Physical Exam  Vitals and nursing note reviewed. Constitutional:       General: He is not in acute distress. Appearance: Normal appearance. He is well-developed. He is not diaphoretic. HENT:      Head: Normocephalic and atraumatic. Mouth/Throat:      Mouth: Mucous membranes are moist.      Pharynx: Oropharynx is clear. No oropharyngeal exudate or posterior oropharyngeal erythema. Comments: No ulcer, no erosion. Denture seems to fit well. Eyes:      General: No scleral icterus. Conjunctiva/sclera: Conjunctivae normal.      Pupils: Pupils are equal, round, and reactive to light. Cardiovascular:      Rate and Rhythm: Normal rate and regular rhythm. Pulses: Normal pulses. Heart sounds: Normal heart sounds. No murmur heard. No friction rub. No gallop. Pulmonary:      Effort: Pulmonary effort is normal. No respiratory distress. Breath sounds: Normal breath sounds. No wheezing or rales. Chest:      Chest wall: No tenderness. Abdominal:      General: Bowel sounds are normal. There is no distension. Palpations: Abdomen is soft. Musculoskeletal:         General: No tenderness or deformity. Normal range of motion. Cervical back: Normal range of motion and neck supple. Skin:     General: Skin is warm and dry. Neurological:      Mental Status: He is alert and oriented to person, place, and time. Cranial Nerves: No cranial nerve deficit. Sensory: No sensory deficit. Psychiatric:         Behavior: Behavior normal.            Please note that this chart was generated using dragon dictation software.   Although every effort was made to ensure the accuracy of this automated transcription, some errors in transcription may have occurred.        --Asha Hoffman MD

## 2023-01-09 ENCOUNTER — TELEPHONE (OUTPATIENT)
Dept: CASE MANAGEMENT | Age: 73
End: 2023-01-09

## 2023-01-09 NOTE — TELEPHONE ENCOUNTER
Pt due for annual Lung Screening CT. Order in place. Called pt to assist with scheduling. No answer- left vm.     Gavin GARCIAN, RN   Lung Nodule Navigator  [facility]  [phone]

## 2023-01-24 ENCOUNTER — TELEPHONE (OUTPATIENT)
Dept: CASE MANAGEMENT | Age: 73
End: 2023-01-24

## 2023-01-24 NOTE — TELEPHONE ENCOUNTER
Patient due for annual CT Lung Screening. Final reminder letter mailed.       Sean GARCIAN, RN   Lung Nodule Navigator  [facility]  [phone]

## 2023-02-06 DIAGNOSIS — I10 ESSENTIAL HYPERTENSION: ICD-10-CM

## 2023-02-06 RX ORDER — LISINOPRIL 20 MG/1
TABLET ORAL
Qty: 90 TABLET | Refills: 0 | Status: SHIPPED | OUTPATIENT
Start: 2023-02-06

## 2023-02-06 NOTE — TELEPHONE ENCOUNTER
Pt needs a refill-     lisinopril (PRINIVIL;ZESTRIL) 20 MG tablet    [facility] [id number] - [address]  [place] [phone]      Please advise

## 2023-02-22 RX ORDER — ATENOLOL 50 MG/1
50 TABLET ORAL DAILY
Qty: 90 TABLET | Refills: 0 | Status: SHIPPED | OUTPATIENT
Start: 2023-02-22 | End: 2023-05-23

## 2023-03-27 ENCOUNTER — TELEPHONE (OUTPATIENT)
Dept: INTERNAL MEDICINE CLINIC | Age: 73
End: 2023-03-27

## 2023-03-27 NOTE — TELEPHONE ENCOUNTER
----- Message from [facility] sent at 3/27/2023  3:38 PM EDT -----  Subject: Appointment Request    Reason for Call: Established Patient Appointment needed: Urgent Cough Cold    QUESTIONS    Reason for appointment request? Available appointments did not meet   patient need     Additional Information for Provider? Patient is experiecing Upper throat   and ear pain on right side. There are no available appointments to meet   patient need.   ---------------------------------------------------------------------------  --------------  Lidia ROMERO  [phone]; OK to leave message on voicemail  ---------------------------------------------------------------------------  --------------  SCRIPT ANSWERS  COVID Screen: Red

## 2023-03-28 ENCOUNTER — OFFICE VISIT (OUTPATIENT)
Dept: INTERNAL MEDICINE CLINIC | Age: 73
End: 2023-03-28
Payer: MEDICARE

## 2023-03-28 VITALS
WEIGHT: 250 LBS | BODY MASS INDEX: 32.08 KG/M2 | HEART RATE: 66 BPM | HEIGHT: 74 IN | SYSTOLIC BLOOD PRESSURE: 130 MMHG | TEMPERATURE: 97.5 F | DIASTOLIC BLOOD PRESSURE: 80 MMHG | OXYGEN SATURATION: 98 %

## 2023-03-28 DIAGNOSIS — E78.00 PURE HYPERCHOLESTEROLEMIA: ICD-10-CM

## 2023-03-28 DIAGNOSIS — Z12.5 PROSTATE CANCER SCREENING: ICD-10-CM

## 2023-03-28 DIAGNOSIS — M79.606 PAIN AND SWELLING OF LOWER EXTREMITY, UNSPECIFIED LATERALITY: ICD-10-CM

## 2023-03-28 DIAGNOSIS — S03.40XA SPRAIN OF TEMPOROMANDIBULAR JOINT, INITIAL ENCOUNTER: ICD-10-CM

## 2023-03-28 DIAGNOSIS — E66.09 CLASS 1 OBESITY DUE TO EXCESS CALORIES WITH SERIOUS COMORBIDITY AND BODY MASS INDEX (BMI) OF 30.0 TO 30.9 IN ADULT: ICD-10-CM

## 2023-03-28 DIAGNOSIS — M79.89 PAIN AND SWELLING OF LOWER EXTREMITY, UNSPECIFIED LATERALITY: ICD-10-CM

## 2023-03-28 DIAGNOSIS — M1A.0720 CHRONIC GOUT OF LEFT FOOT, UNSPECIFIED CAUSE: ICD-10-CM

## 2023-03-28 DIAGNOSIS — M1A.0710 CHRONIC GOUT OF RIGHT FOOT, UNSPECIFIED CAUSE: ICD-10-CM

## 2023-03-28 DIAGNOSIS — I10 ESSENTIAL HYPERTENSION: Primary | ICD-10-CM

## 2023-03-28 PROBLEM — H92.01 EARACHE ON RIGHT: Status: RESOLVED | Noted: 2023-03-28 | Resolved: 2023-03-28

## 2023-03-28 PROBLEM — H92.01 EARACHE ON RIGHT: Status: ACTIVE | Noted: 2023-03-28

## 2023-03-28 PROCEDURE — 3075F SYST BP GE 130 - 139MM HG: CPT | Performed by: STUDENT IN AN ORGANIZED HEALTH CARE EDUCATION/TRAINING PROGRAM

## 2023-03-28 PROCEDURE — 3017F COLORECTAL CA SCREEN DOC REV: CPT | Performed by: STUDENT IN AN ORGANIZED HEALTH CARE EDUCATION/TRAINING PROGRAM

## 2023-03-28 PROCEDURE — 99213 OFFICE O/P EST LOW 20 MIN: CPT | Performed by: STUDENT IN AN ORGANIZED HEALTH CARE EDUCATION/TRAINING PROGRAM

## 2023-03-28 PROCEDURE — 4004F PT TOBACCO SCREEN RCVD TLK: CPT | Performed by: STUDENT IN AN ORGANIZED HEALTH CARE EDUCATION/TRAINING PROGRAM

## 2023-03-28 PROCEDURE — G8427 DOCREV CUR MEDS BY ELIG CLIN: HCPCS | Performed by: STUDENT IN AN ORGANIZED HEALTH CARE EDUCATION/TRAINING PROGRAM

## 2023-03-28 PROCEDURE — G8484 FLU IMMUNIZE NO ADMIN: HCPCS | Performed by: STUDENT IN AN ORGANIZED HEALTH CARE EDUCATION/TRAINING PROGRAM

## 2023-03-28 PROCEDURE — 1123F ACP DISCUSS/DSCN MKR DOCD: CPT | Performed by: STUDENT IN AN ORGANIZED HEALTH CARE EDUCATION/TRAINING PROGRAM

## 2023-03-28 PROCEDURE — 3079F DIAST BP 80-89 MM HG: CPT | Performed by: STUDENT IN AN ORGANIZED HEALTH CARE EDUCATION/TRAINING PROGRAM

## 2023-03-28 PROCEDURE — G8417 CALC BMI ABV UP PARAM F/U: HCPCS | Performed by: STUDENT IN AN ORGANIZED HEALTH CARE EDUCATION/TRAINING PROGRAM

## 2023-03-28 RX ORDER — SPIRONOLACTONE 25 MG/1
25 TABLET ORAL DAILY
Qty: 90 TABLET | Refills: 0 | Status: SHIPPED | OUTPATIENT
Start: 2023-03-28

## 2023-03-28 RX ORDER — ALLOPURINOL 300 MG/1
300 TABLET ORAL DAILY
Qty: 90 TABLET | Refills: 0 | Status: SHIPPED | OUTPATIENT
Start: 2023-03-28

## 2023-03-28 SDOH — ECONOMIC STABILITY: HOUSING INSECURITY
IN THE LAST 12 MONTHS, WAS THERE A TIME WHEN YOU DID NOT HAVE A STEADY PLACE TO SLEEP OR SLEPT IN A SHELTER (INCLUDING NOW)?: NO

## 2023-03-28 SDOH — ECONOMIC STABILITY: INCOME INSECURITY: HOW HARD IS IT FOR YOU TO PAY FOR THE VERY BASICS LIKE FOOD, HOUSING, MEDICAL CARE, AND HEATING?: NOT HARD AT ALL

## 2023-03-28 SDOH — ECONOMIC STABILITY: FOOD INSECURITY: WITHIN THE PAST 12 MONTHS, THE FOOD YOU BOUGHT JUST DIDN'T LAST AND YOU DIDN'T HAVE MONEY TO GET MORE.: NEVER TRUE

## 2023-03-28 SDOH — ECONOMIC STABILITY: FOOD INSECURITY: WITHIN THE PAST 12 MONTHS, YOU WORRIED THAT YOUR FOOD WOULD RUN OUT BEFORE YOU GOT MONEY TO BUY MORE.: NEVER TRUE

## 2023-03-28 ASSESSMENT — ENCOUNTER SYMPTOMS
SHORTNESS OF BREATH: 0
DIARRHEA: 0
CHEST TIGHTNESS: 0
APNEA: 0
VOMITING: 0
WHEEZING: 0
VOICE CHANGE: 0
CONSTIPATION: 0
TROUBLE SWALLOWING: 0
ABDOMINAL DISTENTION: 0
COUGH: 0
PHOTOPHOBIA: 0
ABDOMINAL PAIN: 0
STRIDOR: 0
NAUSEA: 0
CHOKING: 0

## 2023-03-28 ASSESSMENT — PATIENT HEALTH QUESTIONNAIRE - PHQ9
SUM OF ALL RESPONSES TO PHQ QUESTIONS 1-9: 0
2. FEELING DOWN, DEPRESSED OR HOPELESS: 0
1. LITTLE INTEREST OR PLEASURE IN DOING THINGS: 0
SUM OF ALL RESPONSES TO PHQ9 QUESTIONS 1 & 2: 0
SUM OF ALL RESPONSES TO PHQ QUESTIONS 1-9: 0

## 2023-03-28 NOTE — PROGRESS NOTES
[facility]  Acute visit   3/28/2023    Arthur Catherine (:  1950) is a 71 y.o. male, here for evaluation of the following medical concerns:    Chief Complaint   Patient presents with    Ear Problem     Check (R)) ear pain off and on  ,issue with upper right -pallet        ASSESSMENT/ PLAN:  Sprain of temporomandibular joint or ligament   Earache and discomfort most likely related to TMJ  -Exam otherwise benign  -Recommended to follow-up with his dentist        Orders Placed This Encounter   Medications    allopurinol (ZYLOPRIM) 300 MG tablet     Sig: Take 1 tablet by mouth daily     Dispense:  90 tablet     Refill:  0    spironolactone (ALDACTONE) 25 MG tablet     Sig: Take 1 tablet by mouth daily     Dispense:  90 tablet     Refill:  0     **Patient requests 90 days supply**        Medications Discontinued During This Encounter   Medication Reason    allopurinol (ZYLOPRIM) 300 MG tablet REORDER    spironolactone (ALDACTONE) 25 MG tablet REORDER        Return in about 5 months (around 2023) for Annual Physical Exam, Hypertension. HPI    The patient is a 71-year-old male who presented today with a complaint of pain around his right ear. He describes it as ache behind his right ear and sometimes feels like it is in his ear. Denies any hearing loss ringing sensation. No fever, chill, recent sickness. Of note, he has been endorsing some gum discomfort of the right upper molar for the past several months. He will evaluated by his dentist for this discomfort, and has his denture fixed. However the discomfort never goes away. No gum disease or infection. Has the same dentures for many years.     HISTORIES  Current Outpatient Medications on File Prior to Visit   Medication Sig Dispense Refill    atenolol (TENORMIN) 50 MG tablet Take 1 tablet by mouth daily 90 tablet 0    lisinopril (PRINIVIL;ZESTRIL) 20 MG tablet TAKE ONE TABLET BY MOUTH DAILY 90 tablet 0    atorvastatin (LIPITOR)

## 2023-03-28 NOTE — ASSESSMENT & PLAN NOTE
Earache and discomfort most likely related to TMJ  -Exam otherwise benign  -Recommended to follow-up with his dentist

## 2023-05-08 DIAGNOSIS — I10 ESSENTIAL HYPERTENSION: ICD-10-CM

## 2023-05-09 RX ORDER — LISINOPRIL 20 MG/1
TABLET ORAL
Qty: 90 TABLET | Refills: 0 | Status: SHIPPED | OUTPATIENT
Start: 2023-05-09

## 2023-06-20 DIAGNOSIS — M79.89 PAIN AND SWELLING OF LOWER EXTREMITY, UNSPECIFIED LATERALITY: ICD-10-CM

## 2023-06-20 DIAGNOSIS — M1A.0720 CHRONIC GOUT OF LEFT FOOT, UNSPECIFIED CAUSE: ICD-10-CM

## 2023-06-20 DIAGNOSIS — M1A.0710 CHRONIC GOUT OF RIGHT FOOT, UNSPECIFIED CAUSE: ICD-10-CM

## 2023-06-20 DIAGNOSIS — M79.606 PAIN AND SWELLING OF LOWER EXTREMITY, UNSPECIFIED LATERALITY: ICD-10-CM

## 2023-06-21 RX ORDER — SPIRONOLACTONE 25 MG/1
TABLET ORAL
Qty: 90 TABLET | Refills: 0 | Status: SHIPPED | OUTPATIENT
Start: 2023-06-21

## 2023-06-21 RX ORDER — ALLOPURINOL 300 MG/1
TABLET ORAL
Qty: 90 TABLET | Refills: 0 | Status: SHIPPED | OUTPATIENT
Start: 2023-06-21

## 2023-07-25 ENCOUNTER — OFFICE VISIT (OUTPATIENT)
Dept: INTERNAL MEDICINE CLINIC | Age: 73
End: 2023-07-25

## 2023-07-25 VITALS
OXYGEN SATURATION: 97 % | HEIGHT: 74 IN | SYSTOLIC BLOOD PRESSURE: 122 MMHG | TEMPERATURE: 97.1 F | BODY MASS INDEX: 30.29 KG/M2 | WEIGHT: 236 LBS | DIASTOLIC BLOOD PRESSURE: 80 MMHG

## 2023-07-25 DIAGNOSIS — E78.00 PURE HYPERCHOLESTEROLEMIA: ICD-10-CM

## 2023-07-25 DIAGNOSIS — M79.606 PAIN AND SWELLING OF LOWER EXTREMITY, UNSPECIFIED LATERALITY: ICD-10-CM

## 2023-07-25 DIAGNOSIS — M1A.0720 CHRONIC GOUT OF LEFT FOOT, UNSPECIFIED CAUSE: ICD-10-CM

## 2023-07-25 DIAGNOSIS — K70.30 ALCOHOLIC CIRRHOSIS OF LIVER WITHOUT ASCITES (HCC): ICD-10-CM

## 2023-07-25 DIAGNOSIS — R73.9 HYPERGLYCEMIA: Primary | ICD-10-CM

## 2023-07-25 DIAGNOSIS — M1A.0710 CHRONIC GOUT OF RIGHT FOOT, UNSPECIFIED CAUSE: ICD-10-CM

## 2023-07-25 DIAGNOSIS — M79.89 PAIN AND SWELLING OF LOWER EXTREMITY, UNSPECIFIED LATERALITY: ICD-10-CM

## 2023-07-25 DIAGNOSIS — I10 ESSENTIAL HYPERTENSION: ICD-10-CM

## 2023-07-25 DIAGNOSIS — F17.210 CIGARETTE NICOTINE DEPENDENCE WITHOUT COMPLICATION: ICD-10-CM

## 2023-07-25 RX ORDER — FUROSEMIDE 20 MG/1
20 TABLET ORAL DAILY PRN
Qty: 30 TABLET | Refills: 0 | Status: SHIPPED | OUTPATIENT
Start: 2023-07-25 | End: 2023-08-24

## 2023-07-25 RX ORDER — ATENOLOL 50 MG/1
50 TABLET ORAL DAILY
Qty: 90 TABLET | Refills: 1 | Status: SHIPPED | OUTPATIENT
Start: 2023-07-25

## 2023-07-25 RX ORDER — LISINOPRIL 20 MG/1
20 TABLET ORAL DAILY
Qty: 90 TABLET | Refills: 1 | Status: SHIPPED | OUTPATIENT
Start: 2023-07-25

## 2023-07-25 RX ORDER — SPIRONOLACTONE 25 MG/1
25 TABLET ORAL DAILY
Qty: 90 TABLET | Refills: 1 | Status: SHIPPED | OUTPATIENT
Start: 2023-07-25

## 2023-07-25 RX ORDER — ATORVASTATIN CALCIUM 40 MG/1
40 TABLET, FILM COATED ORAL DAILY
Qty: 90 TABLET | Refills: 0 | Status: SHIPPED | OUTPATIENT
Start: 2023-07-25 | End: 2023-07-28 | Stop reason: SDUPTHER

## 2023-07-25 RX ORDER — ALLOPURINOL 300 MG/1
300 TABLET ORAL DAILY
Qty: 90 TABLET | Refills: 1 | Status: SHIPPED | OUTPATIENT
Start: 2023-07-25

## 2023-07-25 ASSESSMENT — ENCOUNTER SYMPTOMS
SHORTNESS OF BREATH: 0
VOICE CHANGE: 0
CONSTIPATION: 0
TROUBLE SWALLOWING: 0
ABDOMINAL PAIN: 0
NAUSEA: 0
WHEEZING: 0
COUGH: 0
STRIDOR: 0
DIARRHEA: 0
CHOKING: 0
ABDOMINAL DISTENTION: 0
CHEST TIGHTNESS: 0
PHOTOPHOBIA: 0
VOMITING: 0
APNEA: 0

## 2023-07-25 NOTE — PROGRESS NOTES
Manasa Reyes (:  1950) is a 71 y.o. male,Established patient, here for evaluation of the following chief complaint(s):  Joint Swelling (X 1 mo. (L) ankle swelling,bruise  easy on forearms recurrimg)         ASSESSMENT/PLAN:  1. Hyperglycemia  -     Hemoglobin A1C; Future  2. Pain and swelling of lower extremity, unspecified laterality  Assessment & Plan:  Swelling localized at at the left ankle joint more prominent than on the right. Patient also have history of gout. Will often x-ray the left ankle further evaluation of his ankle joint. Leg elevation, compression stocking, low-sodium diet  Orders:  -     spironolactone (ALDACTONE) 25 MG tablet; Take 1 tablet by mouth daily, Disp-90 tablet, R-1Normal  -     XR ANKLE LEFT (MIN 3 VIEWS); Future  -     Hemoglobin A1C; Future  -     Lipid, Fasting; Future  -     TSH with Reflex; Future  -     Comprehensive Metabolic Panel; Future  -     CBC with Auto Differential; Future  3. Chronic gout of left foot, unspecified cause  -     allopurinol (ZYLOPRIM) 300 MG tablet; Take 1 tablet by mouth daily, Disp-90 tablet, R-1Normal  4. Chronic gout of right foot, unspecified cause  -     allopurinol (ZYLOPRIM) 300 MG tablet; Take 1 tablet by mouth daily, Disp-90 tablet, R-1Normal  -     Uric Acid; Future  5. Essential hypertension  -     lisinopril (PRINIVIL;ZESTRIL) 20 MG tablet; Take 1 tablet by mouth daily, Disp-90 tablet, R-1Normal  -     Hemoglobin A1C; Future  -     Lipid, Fasting; Future  -     TSH with Reflex; Future  -     Comprehensive Metabolic Panel; Future  -     CBC with Auto Differential; Future  6. Pure hypercholesterolemia  -     atorvastatin (LIPITOR) 40 MG tablet; Take 1 tablet by mouth daily, Disp-90 tablet, R-0**Patient requests 90 days supply**Normal  -     Lipid, Fasting; Future  7. Alcoholic cirrhosis of liver without ascites ([address])  Assessment & Plan:  Recommend patient to abstain from alcohol.   Repeat labs  We will most likely need to

## 2023-07-25 NOTE — ASSESSMENT & PLAN NOTE
Recommend patient to abstain from alcohol.   Repeat labs  We will most likely need to follow-up with GI

## 2023-07-25 NOTE — ASSESSMENT & PLAN NOTE
Swelling localized at at the left ankle joint more prominent than on the right. Patient also have history of gout. Will often x-ray the left ankle further evaluation of his ankle joint.   Leg elevation, compression stocking, low-sodium diet

## 2023-07-27 DIAGNOSIS — R73.9 HYPERGLYCEMIA: ICD-10-CM

## 2023-07-27 DIAGNOSIS — E78.00 PURE HYPERCHOLESTEROLEMIA: ICD-10-CM

## 2023-07-27 DIAGNOSIS — M79.606 PAIN AND SWELLING OF LOWER EXTREMITY, UNSPECIFIED LATERALITY: ICD-10-CM

## 2023-07-27 DIAGNOSIS — I10 ESSENTIAL HYPERTENSION: ICD-10-CM

## 2023-07-27 DIAGNOSIS — M1A.0710 CHRONIC GOUT OF RIGHT FOOT, UNSPECIFIED CAUSE: ICD-10-CM

## 2023-07-27 DIAGNOSIS — M79.89 PAIN AND SWELLING OF LOWER EXTREMITY, UNSPECIFIED LATERALITY: ICD-10-CM

## 2023-07-27 LAB
ALBUMIN SERPL-MCNC: 4.3 G/DL (ref 3.4–5)
ALBUMIN/GLOB SERPL: 1.4 {RATIO} (ref 1.1–2.2)
ALP SERPL-CCNC: 123 U/L (ref 40–129)
ALT SERPL-CCNC: 24 U/L (ref 10–40)
ANION GAP SERPL CALCULATED.3IONS-SCNC: 13 MMOL/L (ref 3–16)
AST SERPL-CCNC: 36 U/L (ref 15–37)
BASOPHILS # BLD: 0 K/UL (ref 0–0.2)
BASOPHILS NFR BLD: 0.6 %
BILIRUB SERPL-MCNC: 0.5 MG/DL (ref 0–1)
BUN SERPL-MCNC: 10 MG/DL (ref 7–20)
CALCIUM SERPL-MCNC: 9.4 MG/DL (ref 8.3–10.6)
CHLORIDE SERPL-SCNC: 97 MMOL/L (ref 99–110)
CHOLEST SERPL-MCNC: 188 MG/DL (ref 0–199)
CO2 SERPL-SCNC: 21 MMOL/L (ref 21–32)
CREAT SERPL-MCNC: 0.9 MG/DL (ref 0.8–1.3)
DEPRECATED RDW RBC AUTO: 16.4 % (ref 12.4–15.4)
EOSINOPHIL # BLD: 0.3 K/UL (ref 0–0.6)
EOSINOPHIL NFR BLD: 5.3 %
GFR SERPLBLD CREATININE-BSD FMLA CKD-EPI: >60 ML/MIN/{1.73_M2}
GLUCOSE SERPL-MCNC: 89 MG/DL (ref 70–99)
HCT VFR BLD AUTO: 38.7 % (ref 40.5–52.5)
HDLC SERPL-MCNC: 46 MG/DL (ref 40–60)
HGB BLD-MCNC: 13.3 G/DL (ref 13.5–17.5)
LDL CHOLESTEROL CALCULATED: 120 MG/DL
LYMPHOCYTES # BLD: 2.4 K/UL (ref 1–5.1)
LYMPHOCYTES NFR BLD: 46.8 %
MCH RBC QN AUTO: 30.7 PG (ref 26–34)
MCHC RBC AUTO-ENTMCNC: 34.5 G/DL (ref 31–36)
MCV RBC AUTO: 89.1 FL (ref 80–100)
MONOCYTES # BLD: 0.4 K/UL (ref 0–1.3)
MONOCYTES NFR BLD: 8.1 %
NEUTROPHILS # BLD: 2 K/UL (ref 1.7–7.7)
NEUTROPHILS NFR BLD: 39.2 %
PLATELET # BLD AUTO: 166 K/UL (ref 135–450)
PMV BLD AUTO: 8.2 FL (ref 5–10.5)
POTASSIUM SERPL-SCNC: 4.6 MMOL/L (ref 3.5–5.1)
PROT SERPL-MCNC: 7.4 G/DL (ref 6.4–8.2)
RBC # BLD AUTO: 4.34 M/UL (ref 4.2–5.9)
SODIUM SERPL-SCNC: 131 MMOL/L (ref 136–145)
TRIGL SERPL-MCNC: 109 MG/DL (ref 0–150)
TSH SERPL DL<=0.005 MIU/L-ACNC: 2.41 UIU/ML (ref 0.27–4.2)
URATE SERPL-MCNC: 3 MG/DL (ref 3.5–7.2)
VLDLC SERPL CALC-MCNC: 22 MG/DL
WBC # BLD AUTO: 5.1 K/UL (ref 4–11)

## 2023-07-28 DIAGNOSIS — D64.9 ANEMIA, UNSPECIFIED TYPE: Primary | ICD-10-CM

## 2023-07-28 DIAGNOSIS — E78.00 PURE HYPERCHOLESTEROLEMIA: ICD-10-CM

## 2023-07-28 LAB
EST. AVERAGE GLUCOSE BLD GHB EST-MCNC: 105.4 MG/DL
HBA1C MFR BLD: 5.3 %

## 2023-07-28 RX ORDER — ATORVASTATIN CALCIUM 80 MG/1
80 TABLET, FILM COATED ORAL DAILY
Qty: 90 TABLET | Refills: 1 | Status: SHIPPED | OUTPATIENT
Start: 2023-07-28

## 2023-08-10 DIAGNOSIS — D64.9 ANEMIA, UNSPECIFIED TYPE: ICD-10-CM

## 2023-08-10 LAB
FERRITIN SERPL IA-MCNC: 53.9 NG/ML (ref 30–400)
FOLATE SERPL-MCNC: >20 NG/ML (ref 4.78–24.2)
VIT B12 SERPL-MCNC: 690 PG/ML (ref 211–911)

## 2023-08-11 LAB
IRON SATN MFR SERPL: 19 % (ref 20–50)
IRON SERPL-MCNC: 75 UG/DL (ref 59–158)
TIBC SERPL-MCNC: 386 UG/DL (ref 260–445)

## 2023-08-28 RX ORDER — FUROSEMIDE 20 MG/1
TABLET ORAL
Qty: 30 TABLET | Refills: 0 | Status: SHIPPED | OUTPATIENT
Start: 2023-08-28

## 2023-10-02 RX ORDER — FUROSEMIDE 20 MG/1
TABLET ORAL
Qty: 30 TABLET | Refills: 0 | Status: SHIPPED | OUTPATIENT
Start: 2023-10-02

## 2023-10-05 ENCOUNTER — OFFICE VISIT (OUTPATIENT)
Dept: INTERNAL MEDICINE CLINIC | Age: 73
End: 2023-10-05

## 2023-10-05 VITALS
BODY MASS INDEX: 30.54 KG/M2 | HEIGHT: 74 IN | TEMPERATURE: 98 F | OXYGEN SATURATION: 98 % | WEIGHT: 238 LBS | HEART RATE: 72 BPM

## 2023-10-05 DIAGNOSIS — E78.00 PURE HYPERCHOLESTEROLEMIA: ICD-10-CM

## 2023-10-05 DIAGNOSIS — I25.10 ATHEROSCLEROSIS OF NATIVE CORONARY ARTERY OF NATIVE HEART WITHOUT ANGINA PECTORIS: Primary | ICD-10-CM

## 2023-10-05 DIAGNOSIS — F17.210 CIGARETTE NICOTINE DEPENDENCE WITHOUT COMPLICATION: ICD-10-CM

## 2023-10-05 DIAGNOSIS — R59.1 LYMPHADENOPATHY: ICD-10-CM

## 2023-10-05 DIAGNOSIS — Z00.00 MEDICARE ANNUAL WELLNESS VISIT, SUBSEQUENT: ICD-10-CM

## 2023-10-05 DIAGNOSIS — Z87.891 PERSONAL HISTORY OF TOBACCO USE: ICD-10-CM

## 2023-10-05 DIAGNOSIS — I10 ESSENTIAL HYPERTENSION: ICD-10-CM

## 2023-10-05 ASSESSMENT — PATIENT HEALTH QUESTIONNAIRE - PHQ9
SUM OF ALL RESPONSES TO PHQ QUESTIONS 1-9: 1
SUM OF ALL RESPONSES TO PHQ QUESTIONS 1-9: 1
SUM OF ALL RESPONSES TO PHQ9 QUESTIONS 1 & 2: 1
SUM OF ALL RESPONSES TO PHQ QUESTIONS 1-9: 1
2. FEELING DOWN, DEPRESSED OR HOPELESS: 0
1. LITTLE INTEREST OR PLEASURE IN DOING THINGS: 1
SUM OF ALL RESPONSES TO PHQ QUESTIONS 1-9: 1

## 2023-10-05 NOTE — PROGRESS NOTES
Medicare Annual Wellness Visit    Dawson Hernandez is here for No chief complaint on file. Assessment & Plan   Atherosclerosis of native coronary artery of native heart without angina pectoris  Assessment & Plan:  Stable   No CP  Cigarette nicotine dependence without complication  Assessment & Plan:  Counseled patient  Has been trying to cut down, using nicotine patches but developed rash. Wants to look into nicotine strips  Will obtain CT lung screening   Last one in 2021  Orders:  -     AR VISIT TO DISCUSS LUNG CA SCREEN W LDCT  -     CT Lung Screen (Initial or Annual); Future  Pure hypercholesterolemia  Assessment & Plan:   Continue statin - Atorvastatin 40 mg   Essential hypertension  Assessment & Plan:   At goal   Continue with same meds  Personal history of tobacco use  -     AR VISIT TO DISCUSS LUNG CA SCREEN W LDCT  Medicare annual wellness visit, subsequent  Assessment & Plan: Within normal limits for age- retired, no ADL issues,no depression ,no cognitive impairment  Immunizations - due for COVID 19 booster, Prevnar 20, flu, Shingrix. Patient will get them at his local pharmacy. Colonoscopy up to date, last on in 2019, polyps repeat in 5 years   Eye exam and dental up to date  Exercises as tolerated    No living will  Findings and recommendations discussed with Pt    Lymphadenopathy  Assessment & Plan:  Nontender submandibular lymph node. No recent illness or URI. No dental infection/abscess. Will have patient see ENT. Does have a history of smoking. Might need biopsy. Orders:  -     Kusum Tang MD, Otolaryngology, [facility]    Recommendations for Preventive Services Due: see orders and patient instructions/AVS.  Recommended screening schedule for the next 5-10 years is provided to the patient in written form: see Patient Instructions/AVS.     Return in about 6 months (around 4/5/2024) for Routine follow-up.      Subjective   The following acute and/or chronic problems were

## 2023-10-05 NOTE — ASSESSMENT & PLAN NOTE
Nontender submandibular lymph node. No recent illness or URI. No dental infection/abscess. Will have patient see ENT. Does have a history of smoking. Might need biopsy.

## 2023-10-05 NOTE — ASSESSMENT & PLAN NOTE
Counseled patient  Has been trying to cut down, using nicotine patches but developed rash.  Wants to look into nicotine strips  Will obtain CT lung screening   Last one in 2021

## 2023-10-05 NOTE — PATIENT INSTRUCTIONS
includes how to handle the relapse and who to notify in case of relapse. Don't give up. Remember that a relapse does not mean that you have failed. Use the experience to learn the triggers that lead you to drink or use drugs. Then quit again. Recovery is a lifelong process. Many people have several relapses before they are able to quit for good. Follow-up care is a key part of your treatment and safety. Be sure to make and go to all appointments, and call your doctor if you are having problems. It's also a good idea to know your test results and keep a list of the medicines you take. When should you call for help? Call 911  anytime you think you may need emergency care. For example, call if you or someone else:    Has overdosed or has withdrawal signs. Be sure to tell the emergency workers that you are or someone else is using or trying to quit using drugs. Overdose or withdrawal signs may include:  Losing consciousness. Seizure. Seeing or hearing things that aren't there (hallucinations). Is thinking or talking about suicide or harming others. Where to get help 24 hours a day, 7 days a week   If you or someone you know talks about suicide, self-harm, a mental health crisis, a substance use crisis, or any other kind of emotional distress, get help right away. You can:    Call the Suicide and Crisis Lifeline at 65. Call 1-800-273-talk (8-999.840.8869). Text HOME to 896098 to access the Crisis Text Line. Consider saving these numbers in your phone. Go to InsideM[employer]. org for more information or to chat online. Call your doctor now or seek immediate medical care if:    You are having withdrawal symptoms. These may include nausea or vomiting, sweating, shakiness, and anxiety. Watch closely for changes in your health, and be sure to contact your doctor if:    You have a relapse. You need more help or support to stop. Where can you learn more?   Go to http://www.woods.com/

## 2023-10-05 NOTE — ASSESSMENT & PLAN NOTE
Within normal limits for age- retired, no ADL issues,no depression ,no cognitive impairment  Immunizations - due for COVID 19 booster, Prevnar 20, flu, Shingrix. Patient will get them at his local pharmacy.    Colonoscopy up to date, last on in 2019, polyps repeat in 5 years   Eye exam and dental up to date  Exercises as tolerated    No living will  Findings and recommendations discussed with Pt

## 2023-10-17 ENCOUNTER — HOSPITAL ENCOUNTER (OUTPATIENT)
Dept: CT IMAGING | Age: 73
Discharge: HOME | End: 2023-10-17
Payer: MEDICARE

## 2023-10-17 ENCOUNTER — OFFICE VISIT (OUTPATIENT)
Dept: ENT CLINIC | Age: 73
End: 2023-10-17
Payer: MEDICARE

## 2023-10-17 VITALS
SYSTOLIC BLOOD PRESSURE: 108 MMHG | WEIGHT: 236.8 LBS | DIASTOLIC BLOOD PRESSURE: 70 MMHG | TEMPERATURE: 98.4 F | BODY MASS INDEX: 30.39 KG/M2 | HEIGHT: 74 IN | HEART RATE: 69 BPM

## 2023-10-17 DIAGNOSIS — R22.1 MASS OF RIGHT SIDE OF NECK: ICD-10-CM

## 2023-10-17 DIAGNOSIS — F17.210 CIGARETTE NICOTINE DEPENDENCE WITHOUT COMPLICATION: ICD-10-CM

## 2023-10-17 DIAGNOSIS — J35.8 TONSILLAR MASS: Primary | ICD-10-CM

## 2023-10-17 PROCEDURE — 1123F ACP DISCUSS/DSCN MKR DOCD: CPT | Performed by: OTOLARYNGOLOGY

## 2023-10-17 PROCEDURE — 3017F COLORECTAL CA SCREEN DOC REV: CPT | Performed by: OTOLARYNGOLOGY

## 2023-10-17 PROCEDURE — 71271 CT THORAX LUNG CANCER SCR C-: CPT

## 2023-10-17 PROCEDURE — 3074F SYST BP LT 130 MM HG: CPT | Performed by: OTOLARYNGOLOGY

## 2023-10-17 PROCEDURE — G8427 DOCREV CUR MEDS BY ELIG CLIN: HCPCS | Performed by: OTOLARYNGOLOGY

## 2023-10-17 PROCEDURE — G8484 FLU IMMUNIZE NO ADMIN: HCPCS | Performed by: OTOLARYNGOLOGY

## 2023-10-17 PROCEDURE — 10021 FNA BX W/O IMG GDN 1ST LES: CPT | Performed by: OTOLARYNGOLOGY

## 2023-10-17 PROCEDURE — 99204 OFFICE O/P NEW MOD 45 MIN: CPT | Performed by: OTOLARYNGOLOGY

## 2023-10-17 PROCEDURE — 4004F PT TOBACCO SCREEN RCVD TLK: CPT | Performed by: OTOLARYNGOLOGY

## 2023-10-17 PROCEDURE — 3078F DIAST BP <80 MM HG: CPT | Performed by: OTOLARYNGOLOGY

## 2023-10-17 PROCEDURE — G8417 CALC BMI ABV UP PARAM F/U: HCPCS | Performed by: OTOLARYNGOLOGY

## 2023-10-17 RX ORDER — IBUPROFEN 200 MG
200 TABLET ORAL EVERY 6 HOURS PRN
COMMUNITY

## 2023-10-17 RX ORDER — MILK THISTLE 500 MG
CAPSULE ORAL
COMMUNITY

## 2023-10-17 NOTE — PROGRESS NOTES
meniscal tear     Right knee     Hyperlipidemia     Hypertension     Impaired fasting glucose                                                     Past Surgical History:   Procedure Laterality Date    COLONOSCOPY  Aug., 2014 ( 2017 )    Dr. Birdie Coyne - tubular adenomas ( 3 )    COLONOSCOPY  *Nov.26, 2019 ( 2024 )    Dr. Edgard Sterling - tubular adenoma     FAMILY HISTORY: Family history reviewed. Except as noted in history of present illness, there is no pertinent family history      REVIEW OF SYSTEMS:  All pertinent positive and negative review of systems included in HPI. Otherwise, all systems are reviewed and negative. PHYSICAL EXAMINATION:   GENERAL: wdwn- no acute distress  RESPIRATORY:  No stridor or respiratory distress  COMMUNICATION :  Normal voice  MENTAL STATUS:  Mood and affect normal, oriented X 3  HEAD AND FACE:  No abnormalities of the skin of face or head  EXTERNAL EARS AND NOSE:  Normal pinnae bilateral  FACIAL MUSCLES:  All branches of facial nerve intact  EXTRAOCULAR MUSCLES: Intact with full range of motion  FACE PALPATION:  No tenderness over sinuses. Zygomatic arches and orbital rims intact  OTOSCOPY:  Normal external auditory canals, tympanic membranes, and middle ear spaces  TUNING FORKS: Rinne ++ Navarro midline at 512 Hz  INTRANASAL:  Septum midline, turbinates normal, meati clear. LIPS, TEETH, GINGIVA:  Normal mucosa  PHARYNX: There is some exudative tissue in the tonsil fossa on the right side. There is induration of this tissue on palpation. NECK:  No masses. LYMPHATIC: There is a firm mass in the right submandibular triangle posterior to the submandibular salivary gland. SALIVARY GLANDS:  No swelling or masses in the parotid or submandibular salivary glands  THYROID:  No goiter or thyroid masses. IMPRESSION: Right neck mass associated with a right tonsil mass. I am concerned about squamous cell carcinoma with metastases.       PLAN: After discussion with patient we agreed to perform a

## 2023-10-20 NOTE — RESULT ENCOUNTER NOTE
Fine-needle aspiration of right neck mass inconclusive. Spoke with patient. He will come to the office next week for a biopsy of the tonsil mass.

## 2023-10-25 ENCOUNTER — TELEPHONE (OUTPATIENT)
Dept: CASE MANAGEMENT | Age: 73
End: 2023-10-25

## 2023-10-25 ENCOUNTER — PROCEDURE VISIT (OUTPATIENT)
Dept: ENT CLINIC | Age: 73
End: 2023-10-25

## 2023-10-25 VITALS
RESPIRATION RATE: 16 BRPM | HEIGHT: 74 IN | DIASTOLIC BLOOD PRESSURE: 75 MMHG | SYSTOLIC BLOOD PRESSURE: 118 MMHG | HEART RATE: 68 BPM | TEMPERATURE: 98.7 F | WEIGHT: 243 LBS | BODY MASS INDEX: 31.18 KG/M2

## 2023-10-25 DIAGNOSIS — J35.8 TONSILLAR MASS: Primary | ICD-10-CM

## 2023-10-25 DIAGNOSIS — R22.1 MASS OF RIGHT SIDE OF NECK: ICD-10-CM

## 2023-10-25 DIAGNOSIS — E78.00 PURE HYPERCHOLESTEROLEMIA: ICD-10-CM

## 2023-10-25 RX ORDER — ATORVASTATIN CALCIUM 40 MG/1
40 TABLET, FILM COATED ORAL DAILY
Qty: 90 TABLET | Refills: 0 | Status: SHIPPED | OUTPATIENT
Start: 2023-10-25

## 2023-10-25 NOTE — PROGRESS NOTES
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 The patient has a very active gag reflex but I was able to see an exfoliative lesion of the right tonsil fossa. Topical anesthesia with lidocaine/Afrin spray. Several small bites taken with a cup forceps but the patient has an active gag reflex and I am not certain I got an adequate specimen. Sent open pathologic section. FOLLOW UP: After pathology.

## 2023-10-25 NOTE — TELEPHONE ENCOUNTER
Annual lung screen on 10/17/23. LRAD2. Recommended screen in one year. Reviewed by ordering physician and ordering office contacts pt with results. Follow-up referrals placed by provider.         Dorothy GARCIAN, RN   Lung Nodule Navigator  [facility]  [phone]

## 2023-10-30 ENCOUNTER — TELEPHONE (OUTPATIENT)
Dept: INTERNAL MEDICINE CLINIC | Age: 73
End: 2023-10-30

## 2023-10-30 NOTE — TELEPHONE ENCOUNTER
Patient returned Jayla's call for CT results from 10/17/23.     CT Lung Screen       Please call and advise

## 2023-11-02 ENCOUNTER — OFFICE VISIT (OUTPATIENT)
Dept: ENT CLINIC | Age: 73
End: 2023-11-02
Payer: MEDICARE

## 2023-11-02 VITALS
TEMPERATURE: 97.5 F | SYSTOLIC BLOOD PRESSURE: 115 MMHG | HEIGHT: 74 IN | BODY MASS INDEX: 31.24 KG/M2 | WEIGHT: 243.4 LBS | DIASTOLIC BLOOD PRESSURE: 6 MMHG | HEART RATE: 71 BPM

## 2023-11-02 DIAGNOSIS — R22.1 MASS OF RIGHT SIDE OF NECK: ICD-10-CM

## 2023-11-02 DIAGNOSIS — J35.8 TONSILLAR MASS: Primary | ICD-10-CM

## 2023-11-02 PROCEDURE — 3017F COLORECTAL CA SCREEN DOC REV: CPT | Performed by: OTOLARYNGOLOGY

## 2023-11-02 PROCEDURE — G8484 FLU IMMUNIZE NO ADMIN: HCPCS | Performed by: OTOLARYNGOLOGY

## 2023-11-02 PROCEDURE — 3074F SYST BP LT 130 MM HG: CPT | Performed by: OTOLARYNGOLOGY

## 2023-11-02 PROCEDURE — 3078F DIAST BP <80 MM HG: CPT | Performed by: OTOLARYNGOLOGY

## 2023-11-02 PROCEDURE — 1123F ACP DISCUSS/DSCN MKR DOCD: CPT | Performed by: OTOLARYNGOLOGY

## 2023-11-02 PROCEDURE — 99212 OFFICE O/P EST SF 10 MIN: CPT | Performed by: OTOLARYNGOLOGY

## 2023-11-02 PROCEDURE — 4004F PT TOBACCO SCREEN RCVD TLK: CPT | Performed by: OTOLARYNGOLOGY

## 2023-11-02 PROCEDURE — G8427 DOCREV CUR MEDS BY ELIG CLIN: HCPCS | Performed by: OTOLARYNGOLOGY

## 2023-11-02 PROCEDURE — G8417 CALC BMI ABV UP PARAM F/U: HCPCS | Performed by: OTOLARYNGOLOGY

## 2023-11-02 NOTE — PROGRESS NOTES
Right-sided tonsil mass which is better palpated than seen with a neck mass on the right which is strongly suspicious for metastatic lymph node. Fine-needle aspiration of the lymph node showed suspicious squamous cells but the pathologist could not call the diagnosis. I made a biopsy in the office but the patient has an active gag reflex and the biopsy was superficial.  The pathology called high dysplasia strongly suggest carcinoma but once again would not call invasive carcinoma I reviewed the patient with hematologist/oncologist and they feel they need more tissue prior to proceeding with definitive treatment. I have reviewed the situation with the patient and we agreed to go to the operating room for a more in-depth biopsy to be done under general anesthesia.

## 2023-11-03 ENCOUNTER — TELEPHONE (OUTPATIENT)
Dept: INTERNAL MEDICINE CLINIC | Age: 73
End: 2023-11-03

## 2023-11-03 NOTE — TELEPHONE ENCOUNTER
Patient is asking if his [facility] appointment from 10/5/23 would be ok to use for his clear for biopsy on his throat on 11/7/23 or would he need to schedule a pre-op appointment.      Please call and advise [phone]

## 2023-11-04 PROBLEM — Z00.00 MEDICARE ANNUAL WELLNESS VISIT, SUBSEQUENT: Status: RESOLVED | Noted: 2023-10-05 | Resolved: 2023-11-04

## 2023-11-06 ENCOUNTER — OFFICE VISIT (OUTPATIENT)
Dept: INTERNAL MEDICINE CLINIC | Age: 73
End: 2023-11-06
Payer: MEDICARE

## 2023-11-06 VITALS
SYSTOLIC BLOOD PRESSURE: 110 MMHG | HEIGHT: 74 IN | WEIGHT: 243 LBS | BODY MASS INDEX: 31.18 KG/M2 | TEMPERATURE: 98.2 F | DIASTOLIC BLOOD PRESSURE: 70 MMHG | OXYGEN SATURATION: 97 % | HEART RATE: 68 BPM

## 2023-11-06 DIAGNOSIS — Z01.818 PREOP EXAMINATION: Primary | ICD-10-CM

## 2023-11-06 DIAGNOSIS — F17.210 CIGARETTE NICOTINE DEPENDENCE WITHOUT COMPLICATION: ICD-10-CM

## 2023-11-06 DIAGNOSIS — J35.8 TONSILLAR MASS: ICD-10-CM

## 2023-11-06 PROCEDURE — 4004F PT TOBACCO SCREEN RCVD TLK: CPT | Performed by: STUDENT IN AN ORGANIZED HEALTH CARE EDUCATION/TRAINING PROGRAM

## 2023-11-06 PROCEDURE — G8417 CALC BMI ABV UP PARAM F/U: HCPCS | Performed by: STUDENT IN AN ORGANIZED HEALTH CARE EDUCATION/TRAINING PROGRAM

## 2023-11-06 PROCEDURE — 3017F COLORECTAL CA SCREEN DOC REV: CPT | Performed by: STUDENT IN AN ORGANIZED HEALTH CARE EDUCATION/TRAINING PROGRAM

## 2023-11-06 PROCEDURE — 3078F DIAST BP <80 MM HG: CPT | Performed by: STUDENT IN AN ORGANIZED HEALTH CARE EDUCATION/TRAINING PROGRAM

## 2023-11-06 PROCEDURE — 99212 OFFICE O/P EST SF 10 MIN: CPT | Performed by: STUDENT IN AN ORGANIZED HEALTH CARE EDUCATION/TRAINING PROGRAM

## 2023-11-06 PROCEDURE — 3074F SYST BP LT 130 MM HG: CPT | Performed by: STUDENT IN AN ORGANIZED HEALTH CARE EDUCATION/TRAINING PROGRAM

## 2023-11-06 PROCEDURE — G8484 FLU IMMUNIZE NO ADMIN: HCPCS | Performed by: STUDENT IN AN ORGANIZED HEALTH CARE EDUCATION/TRAINING PROGRAM

## 2023-11-06 PROCEDURE — G8428 CUR MEDS NOT DOCUMENT: HCPCS | Performed by: STUDENT IN AN ORGANIZED HEALTH CARE EDUCATION/TRAINING PROGRAM

## 2023-11-06 PROCEDURE — 1124F ACP DISCUSS-NO DSCNMKR DOCD: CPT | Performed by: STUDENT IN AN ORGANIZED HEALTH CARE EDUCATION/TRAINING PROGRAM

## 2023-11-06 RX ORDER — FUROSEMIDE 20 MG/1
TABLET ORAL
Qty: 30 TABLET | Refills: 0 | Status: SHIPPED | OUTPATIENT
Start: 2023-11-06

## 2023-11-07 NOTE — PROGRESS NOTES
.  Date and time of surgery : 11/9/2023 at 0915 am             Arrival Time:  0715 am     Bring Picture ID and insurance card. Please wear simple, loose fitting clothing to the hospital.   Kyler isabel bring valuables (money, credit cards, checkbooks, etc.)   DO NOT wear any jewelry or piercings on day of surgery. All body piercing jewelry must be removed. If you have dentures, they will be removed before going to the OR; we will provide you a container. If you wear contact lenses or glasses, they will be removed; please bring a case for them. Shower the evening before or morning of surgery with antibacterial soap. Nothing to eat or drink after midnight the day before surgery. You may brush your teeth and gargle the morning of surgery. DO NOT SWALLOW WATER. Take atenolol the morning of surgery with a sip of water. Aspirin, Ibuprofen, Advil, Naproxen, Vitamin E and other Anti-inflammatory products and supplements should be stopped for 5 -7days before surgery or as directed by your physician. Do not smoke or drink any alcoholic beverages 24 hours prior to surgery. This includes NA Beer. Refrain from the usage of any recreational drugs, including non-prescribed prescription drugs. You MUST plan for a responsible adult to stay on site while you are here and take you home after your surgery. You will not be allowed to leave alone or drive yourself home. It is strongly suggested someone stay with you the first 24 hrs. Your surgery will be cancelled if you do not have a ride home. To help prevent infection, change your sheets the night before surgery. If you  have a Living Will and Durable Power of  for Healthcare, please bring in a copy. Notify your Surgeon if you develop any illness between now and time of surgery.  Cough, cold, fever, sore throat, nausea, vomiting, etc.  Please notify your surgeon if you experience dizziness, shortness of breath or blurred vision between now & the time of your surgery  To provide excellent care visitors will be limited to two per room at any given time. No visitors under the age of 15. If you use oxygen and have a portable tank please bring it with you the DOS  For your convenience [place] has a pharmacy on site to fill your prescriptions. *Please call pre admission testing if you have any further questions             Erika Montez      [phone]                            Address: [address] [place] [address]     When you pull into the hospital and are looking at the main hospital entrance, turn right. We are a tan building to the right of the main entrance. [address] over the door.   .                                                        Revision History

## 2023-11-07 NOTE — PROGRESS NOTES
Ladean Shuck    Age 68 y.o.    male    1950    MRN [id number]    11/9/2023  Arrival Time_____________  OR Time____________45 [address] Joshua     Procedure(s):  BIOPSY RIGHT TONSIL                      General    Surgeon(s):  Maria E Saavedra, MD       Phone [phone] ([place])     [facility]  [facility]         Work  _____________________________________________________________________  _____________________________________________________________________  _____________________________________________________________________  _____________________________________________________________________  _____________________________________________________________________    PCP _____________________________ Phone_________________     H&P  ________________  Bringing      Chart              Epic      DOS      Called________  EKG ________________   Bringing      Chart              Epic      DOS      Called________  LABS________________   Bringing     Chart              Epic      DOS      Called________  Cardiac Clearance ______ Bringing      Chart              Epic      DOS      Called________  Pulmonary Clearance____ Bringing      Chart              Epic      DOS      Called________    Cardiologist________________________ Phone___________________________  Pulmonologist_______________________Phone___________________________    ? Advance Directives   ? Synagogue concerns / Waiver on Chart            PAT Communications________________  ? Pre-op Instructions Given [address]          _________________________________  ? Directions to Surgery Center                          _________________________________  ? Transportation Home_______________      __________________________________  ?  Crutches/Walker__________________        __________________________________    ________Pre-op Orders   _______Transcribed    _______Wt.  ________Pharmacy          _______SCD       ______SANTI Duncan

## 2023-11-08 ENCOUNTER — ANESTHESIA EVENT (OUTPATIENT)
Dept: OPERATING ROOM | Age: 73
End: 2023-11-08
Payer: MEDICARE

## 2023-11-08 ENCOUNTER — TELEPHONE (OUTPATIENT)
Dept: ENT CLINIC | Age: 73
End: 2023-11-08

## 2023-11-08 NOTE — TELEPHONE ENCOUNTER
Bridget Morel Parent' office called and said after multiple attempts they've been unable to reach the patient to schedule.

## 2023-11-09 ENCOUNTER — ANESTHESIA (OUTPATIENT)
Dept: OPERATING ROOM | Age: 73
End: 2023-11-09
Payer: MEDICARE

## 2023-11-09 ENCOUNTER — HOSPITAL ENCOUNTER (OUTPATIENT)
Age: 73
Setting detail: OUTPATIENT SURGERY
Discharge: HOME | End: 2023-11-09
Attending: OTOLARYNGOLOGY | Admitting: OTOLARYNGOLOGY
Payer: MEDICARE

## 2023-11-09 VITALS
DIASTOLIC BLOOD PRESSURE: 75 MMHG | TEMPERATURE: 97.2 F | RESPIRATION RATE: 16 BRPM | HEIGHT: 74 IN | BODY MASS INDEX: 30.81 KG/M2 | SYSTOLIC BLOOD PRESSURE: 120 MMHG | HEART RATE: 70 BPM | OXYGEN SATURATION: 98 % | WEIGHT: 240.08 LBS

## 2023-11-09 DIAGNOSIS — J35.8 TONSILLAR MASS: ICD-10-CM

## 2023-11-09 DIAGNOSIS — R22.1 NECK MASS: ICD-10-CM

## 2023-11-09 LAB
EKG ATRIAL RATE: 59 BPM
EKG DIAGNOSIS: NORMAL
EKG P AXIS: 66 DEGREES
EKG P-R INTERVAL: 198 MS
EKG Q-T INTERVAL: 460 MS
EKG QRS DURATION: 88 MS
EKG QTC CALCULATION (BAZETT): 455 MS
EKG R AXIS: 34 DEGREES
EKG T AXIS: 51 DEGREES
EKG VENTRICULAR RATE: 59 BPM

## 2023-11-09 PROCEDURE — 88307 TISSUE EXAM BY PATHOLOGIST: CPT

## 2023-11-09 PROCEDURE — 6360000002 HC RX W HCPCS: Performed by: NURSE ANESTHETIST, CERTIFIED REGISTERED

## 2023-11-09 PROCEDURE — 6370000000 HC RX 637 (ALT 250 FOR IP)

## 2023-11-09 PROCEDURE — 2709999900 HC NON-CHARGEABLE SUPPLY: Performed by: OTOLARYNGOLOGY

## 2023-11-09 PROCEDURE — 7100000011 HC PHASE II RECOVERY - ADDTL 15 MIN: Performed by: OTOLARYNGOLOGY

## 2023-11-09 PROCEDURE — 42800 BIOPSY OF THROAT: CPT | Performed by: OTOLARYNGOLOGY

## 2023-11-09 PROCEDURE — 2500000003 HC RX 250 WO HCPCS: Performed by: NURSE ANESTHETIST, CERTIFIED REGISTERED

## 2023-11-09 PROCEDURE — 2580000003 HC RX 258: Performed by: NURSE ANESTHETIST, CERTIFIED REGISTERED

## 2023-11-09 PROCEDURE — A4217 STERILE WATER/SALINE, 500 ML: HCPCS | Performed by: OTOLARYNGOLOGY

## 2023-11-09 PROCEDURE — 7100000010 HC PHASE II RECOVERY - FIRST 15 MIN: Performed by: OTOLARYNGOLOGY

## 2023-11-09 PROCEDURE — 3600000002 HC SURGERY LEVEL 2 BASE: Performed by: OTOLARYNGOLOGY

## 2023-11-09 PROCEDURE — 93010 ELECTROCARDIOGRAM REPORT: CPT | Performed by: INTERNAL MEDICINE

## 2023-11-09 PROCEDURE — 2580000003 HC RX 258: Performed by: OTOLARYNGOLOGY

## 2023-11-09 PROCEDURE — 3600000012 HC SURGERY LEVEL 2 ADDTL 15MIN: Performed by: OTOLARYNGOLOGY

## 2023-11-09 PROCEDURE — 31525 DX LARYNGOSCOPY EXCL NB: CPT | Performed by: OTOLARYNGOLOGY

## 2023-11-09 PROCEDURE — 93005 ELECTROCARDIOGRAM TRACING: CPT | Performed by: ANESTHESIOLOGY

## 2023-11-09 PROCEDURE — 3700000000 HC ANESTHESIA ATTENDED CARE: Performed by: OTOLARYNGOLOGY

## 2023-11-09 PROCEDURE — 88342 IMHCHEM/IMCYTCHM 1ST ANTB: CPT

## 2023-11-09 PROCEDURE — 3700000001 HC ADD 15 MINUTES (ANESTHESIA): Performed by: OTOLARYNGOLOGY

## 2023-11-09 RX ORDER — DIPHENHYDRAMINE HYDROCHLORIDE 50 MG/ML
12.5 INJECTION INTRAMUSCULAR; INTRAVENOUS
Status: DISCONTINUED | OUTPATIENT
Start: 2023-11-09 | End: 2023-11-09 | Stop reason: HOSPADM

## 2023-11-09 RX ORDER — OXYCODONE HYDROCHLORIDE AND ACETAMINOPHEN 5; 325 MG/1; MG/1
1 TABLET ORAL EVERY 4 HOURS PRN
Qty: 25 TABLET | Refills: 0 | Status: SHIPPED | OUTPATIENT
Start: 2023-11-09 | End: 2023-11-14

## 2023-11-09 RX ORDER — MEPERIDINE HYDROCHLORIDE 50 MG/ML
12.5 INJECTION INTRAMUSCULAR; INTRAVENOUS; SUBCUTANEOUS EVERY 5 MIN PRN
Status: DISCONTINUED | OUTPATIENT
Start: 2023-11-09 | End: 2023-11-09 | Stop reason: HOSPADM

## 2023-11-09 RX ORDER — KETAMINE HCL IN NACL, ISO-OSM 100MG/10ML
SYRINGE (ML) INJECTION PRN
Status: DISCONTINUED | OUTPATIENT
Start: 2023-11-09 | End: 2023-11-09 | Stop reason: SDUPTHER

## 2023-11-09 RX ORDER — LABETALOL HYDROCHLORIDE 5 MG/ML
5 INJECTION, SOLUTION INTRAVENOUS EVERY 10 MIN PRN
Status: DISCONTINUED | OUTPATIENT
Start: 2023-11-09 | End: 2023-11-09 | Stop reason: HOSPADM

## 2023-11-09 RX ORDER — ASCORBIC ACID 1000 MG
TABLET ORAL
COMMUNITY

## 2023-11-09 RX ORDER — SODIUM CHLORIDE 9 MG/ML
INJECTION, SOLUTION INTRAVENOUS PRN
Status: DISCONTINUED | OUTPATIENT
Start: 2023-11-09 | End: 2023-11-09 | Stop reason: HOSPADM

## 2023-11-09 RX ORDER — OXYCODONE HYDROCHLORIDE 5 MG/1
10 TABLET ORAL PRN
Status: COMPLETED | OUTPATIENT
Start: 2023-11-09 | End: 2023-11-09

## 2023-11-09 RX ORDER — ROCURONIUM BROMIDE 10 MG/ML
INJECTION, SOLUTION INTRAVENOUS PRN
Status: DISCONTINUED | OUTPATIENT
Start: 2023-11-09 | End: 2023-11-09 | Stop reason: SDUPTHER

## 2023-11-09 RX ORDER — ONDANSETRON 2 MG/ML
4 INJECTION INTRAMUSCULAR; INTRAVENOUS
Status: DISCONTINUED | OUTPATIENT
Start: 2023-11-09 | End: 2023-11-09 | Stop reason: HOSPADM

## 2023-11-09 RX ORDER — SODIUM CHLORIDE 0.9 % (FLUSH) 0.9 %
5-40 SYRINGE (ML) INJECTION EVERY 12 HOURS SCHEDULED
Status: DISCONTINUED | OUTPATIENT
Start: 2023-11-09 | End: 2023-11-09 | Stop reason: HOSPADM

## 2023-11-09 RX ORDER — OXYCODONE HYDROCHLORIDE 5 MG/1
TABLET ORAL
Status: COMPLETED
Start: 2023-11-09 | End: 2023-11-09

## 2023-11-09 RX ORDER — MAGNESIUM HYDROXIDE 1200 MG/15ML
LIQUID ORAL CONTINUOUS PRN
Status: COMPLETED | OUTPATIENT
Start: 2023-11-09 | End: 2023-11-09

## 2023-11-09 RX ORDER — SODIUM CHLORIDE 0.9 % (FLUSH) 0.9 %
5-40 SYRINGE (ML) INJECTION PRN
Status: DISCONTINUED | OUTPATIENT
Start: 2023-11-09 | End: 2023-11-09 | Stop reason: HOSPADM

## 2023-11-09 RX ORDER — FENTANYL CITRATE 50 UG/ML
INJECTION, SOLUTION INTRAMUSCULAR; INTRAVENOUS PRN
Status: DISCONTINUED | OUTPATIENT
Start: 2023-11-09 | End: 2023-11-09 | Stop reason: SDUPTHER

## 2023-11-09 RX ORDER — LIDOCAINE HYDROCHLORIDE 10 MG/ML
1 INJECTION, SOLUTION EPIDURAL; INFILTRATION; INTRACAUDAL; PERINEURAL
Status: DISCONTINUED | OUTPATIENT
Start: 2023-11-09 | End: 2023-11-09 | Stop reason: HOSPADM

## 2023-11-09 RX ORDER — GLYCOPYRROLATE 0.2 MG/ML
INJECTION INTRAMUSCULAR; INTRAVENOUS PRN
Status: DISCONTINUED | OUTPATIENT
Start: 2023-11-09 | End: 2023-11-09 | Stop reason: SDUPTHER

## 2023-11-09 RX ORDER — ONDANSETRON 2 MG/ML
INJECTION INTRAMUSCULAR; INTRAVENOUS PRN
Status: DISCONTINUED | OUTPATIENT
Start: 2023-11-09 | End: 2023-11-09 | Stop reason: SDUPTHER

## 2023-11-09 RX ORDER — OXYCODONE HYDROCHLORIDE 5 MG/1
5 TABLET ORAL PRN
Status: COMPLETED | OUTPATIENT
Start: 2023-11-09 | End: 2023-11-09

## 2023-11-09 RX ORDER — PROPOFOL 10 MG/ML
INJECTION, EMULSION INTRAVENOUS PRN
Status: DISCONTINUED | OUTPATIENT
Start: 2023-11-09 | End: 2023-11-09 | Stop reason: SDUPTHER

## 2023-11-09 RX ORDER — SODIUM CHLORIDE, SODIUM LACTATE, POTASSIUM CHLORIDE, CALCIUM CHLORIDE 600; 310; 30; 20 MG/100ML; MG/100ML; MG/100ML; MG/100ML
INJECTION, SOLUTION INTRAVENOUS CONTINUOUS
Status: DISCONTINUED | OUTPATIENT
Start: 2023-11-09 | End: 2023-11-09 | Stop reason: HOSPADM

## 2023-11-09 RX ADMIN — OXYCODONE HYDROCHLORIDE 5 MG: 5 TABLET ORAL at 09:57

## 2023-11-09 RX ADMIN — FENTANYL CITRATE 50 MCG: 50 INJECTION, SOLUTION INTRAMUSCULAR; INTRAVENOUS at 09:10

## 2023-11-09 RX ADMIN — DEXMEDETOMIDINE HYDROCHLORIDE 7.5 MCG: 100 INJECTION, SOLUTION INTRAVENOUS at 08:55

## 2023-11-09 RX ADMIN — OXYCODONE 5 MG: 5 TABLET ORAL at 09:57

## 2023-11-09 RX ADMIN — GLYCOPYRROLATE 0.2 MG: 0.2 INJECTION, SOLUTION INTRAMUSCULAR; INTRAVENOUS at 09:00

## 2023-11-09 RX ADMIN — Medication 15 MG: at 09:04

## 2023-11-09 RX ADMIN — PROPOFOL 50 MG: 10 INJECTION, EMULSION INTRAVENOUS at 09:05

## 2023-11-09 RX ADMIN — ROCURONIUM BROMIDE 30 MG: 50 INJECTION, SOLUTION INTRAVENOUS at 09:00

## 2023-11-09 RX ADMIN — ONDANSETRON 4 MG: 2 INJECTION INTRAMUSCULAR; INTRAVENOUS at 09:00

## 2023-11-09 RX ADMIN — PROPOFOL 200 MG: 10 INJECTION, EMULSION INTRAVENOUS at 09:00

## 2023-11-09 ASSESSMENT — PAIN SCALES - GENERAL
PAINLEVEL_OUTOF10: 0
PAINLEVEL_OUTOF10: 0
PAINLEVEL_OUTOF10: 2
PAINLEVEL_OUTOF10: 2

## 2023-11-09 ASSESSMENT — PAIN DESCRIPTION - LOCATION
LOCATION: THROAT
LOCATION: THROAT

## 2023-11-09 ASSESSMENT — PAIN - FUNCTIONAL ASSESSMENT
PAIN_FUNCTIONAL_ASSESSMENT: 0-10
PAIN_FUNCTIONAL_ASSESSMENT: PREVENTS OR INTERFERES SOME ACTIVE ACTIVITIES AND ADLS

## 2023-11-09 ASSESSMENT — PAIN DESCRIPTION - ORIENTATION: ORIENTATION: RIGHT

## 2023-11-09 ASSESSMENT — PAIN DESCRIPTION - DESCRIPTORS: DESCRIPTORS: DISCOMFORT

## 2023-11-09 ASSESSMENT — PAIN DESCRIPTION - FREQUENCY: FREQUENCY: CONTINUOUS

## 2023-11-09 NOTE — PROGRESS NOTES
Upon removal of drapes, patient presents with towel clip thru  superficial eye brow. Dr. Refugio Mckee aware and assessed wound, pressure held to site, Polysporin applied to site. Patient not actively bleeding at wound.

## 2023-11-09 NOTE — PROGRESS NOTES
Advanced pt from lying to sitting without adverse event/pt denies complaints of dizziness/ponv/ RESP  WNL/ surgical drsg/ unchanged from my  last pacu assessment entry     Pt states pain is at a tolerable level-3     instructed pt if no void in 8 hours contact physician or go to ER    Discharge instructions reviewed with patient/responsible adult and understanding verbalized. Discharge instructions signed and copies given. Rx    percocet                Given to pts responsible adult/instructed not to drive/ingest alcohol while taking narcotic medications. Instructed pt/family member Last dose of narcotics given in recovery room was   oxycodone           @     939        Am/pm  . Medication information  sheet given to pt/family-all questions answered     If you are taking any anxioytics  or additional pain medicine please confirm with you ordering physician before  you start a new medicine      Please follow narcotic administration as prescribed by your surgeon- any questions call your surgeon. If you have any problems contact your surgeon and  Go to the emergency room.     Operative drsg unchanged from my initial pacu assessment

## 2023-11-09 NOTE — BRIEF OP NOTE
Brief Postoperative Note      Patient: Ashley Landon  YOB: 1950  MRN: [id number]    Date of Procedure: 11/9/2023    Pre-Op Diagnosis Codes:     * Tonsillar mass [J35.8]     * Neck mass [R22.1]    Post-Op Diagnosis: Same       Procedure(s):  BIOPSY RIGHT TONSIL, DIRECT LARYNGOSCOPY    Surgeon(s):  Redia Boas, MD    Assistant:  * No surgical staff found *    Anesthesia: General    Estimated Blood Loss (mL): less than 50     Complications: None    Specimens:   ID Type Source Tests Collected by Time Destination   A : right tonsil fossa mass Specimen Neck SURGICAL PATHOLOGY Redia Boas, MD 11/9/2023 0912        Implants:  * No implants in log *      Drains: * No LDAs found *        Electronically signed by Redia Boas, MD on 11/9/2023 at 9:24 AM

## 2023-11-09 NOTE — PROGRESS NOTES
Advancing hob without compaints  Tolerating Drinking and eating : Attempted to implement non pharmacological methods  of pain management-repositioned/ ice in place   Medicated for pain   No ponv  Pt alert and appropriate  Respirations  Easy/ unlabored/ no Chest pain or SOB   Surgical drsg unchanged from initial assessment  Circulation checks on operative limb unchanged from last entry in pacu

## 2023-11-09 NOTE — PROGRESS NOTES
Pt received from endo/OR suite - accompanied by procedure Rn and CRNA   Pt  - emerging from anesthesia - VSS   Respirations clear / unlabored  Pt without chest pain / NO PONV  Hear rate regular   Resting comfortably       700       ML of Lactated ringers infused on arrival to pacu

## 2023-11-09 NOTE — DISCHARGE INSTRUCTIONS
Rosendo Jean if you have excess bleeding, temperature over 100.5F  or worsening of symptoms      ANESTHESIA DISCHARGE INSTRUCTIONS    You are under the influence of drugs- do not drink alcohol, drive a car, operate machinery(such as power tools, kitchen appliances, etc), sign legal documents, or make any important decisions for 24 hours (or while on pain medications). Children should not ride bikes or [place] or play on gym sets  for 24 hours after surgery. A responsible adult should be with you for 24 hours. Rest at home today- increase activity as tolerated. Progress slowly to a regular diet unless your physician has instructed you otherwise. Drink plenty of water. CALL YOUR DOCTOR IF YOU:  Have moderate to severe nausea or vomiting AND are unable to hold down fluids or prescribed medications. Have bright red bloody drainage from your dressing that won't stop oozing. Do not get relief with your pain medication    NORMAL (POSSIBLE) SIDE EFFECTS FROM ANESTHESIA:     Confusion, temporary memory loss, delayed reaction times in the first 24 hours  Lightheadedness, dizziness, difficulty focusing, blurred vision  Nausea/vomiting can happen  Shivering, feeling cold, sore throat, cough and muscle aches should stop within 24-48 hours  Trouble urinating - call your surgeon if it has been more than 8 hrs  Bruising or soreness at the IV site - call if it remains red, firm or there is drainage             FEMALES OF CHILDBEARING AGE WHO ARE TAKING BIRTH CONTROL PILLS:  You may have received a medication during your procedure that interferes with the   actions of birth control pills (Bridion or Emend). Use some other kind of birth control in addition to your pills, like a condom, for 1 month after your procedure to prevent unwanted pregnancy. The following instructions are to be followed if you have a known history or diagnosis of sleep apnea:   For all sleep apnea patients:  ? Sleep on your side or sitting several days after surgery depending on your surgeon's instructions and/or the type of surgery you are having. 3. Family and/or friends who visit you should NOT touch the surgical wound or dressings until advised by your surgeon. 4. Be sure to elevate and decrease the swelling after your surgery to help prevent infection. 5. If you are a diabetic, you need to closely monitor your blood sugar levels and report any significant increases or changes to your surgeon to help promote the healing process.

## 2023-11-10 NOTE — OP NOTE
[address] [place] [address]                                OPERATIVE REPORT    PATIENT NAME: Kaylan Coppola                :        1950  MED REC NO:   [id number]                          ROOM:  ACCOUNT NO:   [de-identified]                           ADMIT DATE: 2023  PROVIDER:     Santiago Zhong MD    DATE OF PROCEDURE:  2023    PREOPERATIVE DIAGNOSES:  Mass of the right tonsil area and mass of the  right neck. POSTOPERATIVE DIAGNOSES:  Mass of the right tonsil area and mass of the  right neck. OPERATION PERFORMED:  Direct laryngoscopy, diagnostic and biopsy of the  oropharynx. SURGEON:  Santiago Zhong MD    ANESTHESIA:  General with endotracheal intubation. OPERATIVE PROCEDURE:  Under satisfactory general anesthesia, the  hypopharynx and larynx were exposed with an anterior commissure  laryngoscope. The tongue base was normal.  The epiglottis was normal  On its lingual and laryngeal surfaces. The arytenoids and post cricoid area werenormal.  The piriform sinuses were normal.  The patient's pharynx was  then exposed in a Selina position using a Corey-Michael mouth gag. There  was marked induration of the tonsil fossa on the right that extended up  to the soft pallet. Multiple biopsies were taken with a punch biopsy  forceps and sent for permanent pathologic section. Bleeding from the  biopsy site was controlled with suction cautery set at 30 spray and the  patient tolerated the procedure well and was transferred to the recovery  room in good condition. ESTIMATED BLOOD LOSS:  Less than 50 mL.         Santiago Zhong MD    D: 2023 9:28:35       T: 2023 13:21:12     [email or web address]  Job#: [id number]     Doc#: [id number]    CC:

## 2023-11-13 NOTE — RESULT ENCOUNTER NOTE
I called the patient and reported the pathology results. 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 I have reached out to Dr. Bonita Nogueira at [facility] and they will call him in for an appointment.

## 2023-11-22 ENCOUNTER — TELEPHONE (OUTPATIENT)
Dept: INTERNAL MEDICINE CLINIC | Age: 73
End: 2023-11-22

## 2023-11-22 ENCOUNTER — TELEPHONE (OUTPATIENT)
Dept: SURGERY | Age: 73
End: 2023-11-22

## 2023-11-22 NOTE — TELEPHONE ENCOUNTER
Cindy Wolf from [facility] called wanting to see if Dr. Farooq Peterson would be able to see patient for a port & a peg prior to the start of radiation and treatment. (around 12/5) She would like us to call her to discuss if that is something the ofc could do    Please contact her at [phone]

## 2023-11-22 NOTE — TELEPHONE ENCOUNTER
I reviewed his lab, does have history of Cirrhosis. Will send standard dose Paxlovid. Monitor sx at home.

## 2023-11-22 NOTE — PROGRESS NOTES
Patient has + COVID 19 with mild sx.   Also has hx of cirrhosis, Child Musa Class A, no dose adjustment needed for Paxlovid.   Stand dose paxlovid rx sent to [facility].

## 2023-11-22 NOTE — TELEPHONE ENCOUNTER
Patient started feeling bad 11/13/23, with achy, cough, headache, sore throat, runny nose. ..     Covid positive    Interested in [address] [place] [address], [facility] [phone] - F [phone]

## 2023-11-27 ENCOUNTER — TELEPHONE (OUTPATIENT)
Dept: SURGERY | Age: 73
End: 2023-11-27

## 2023-11-27 NOTE — TELEPHONE ENCOUNTER
LM to call office to schedule appt   Referral from [facility] for port and peg placement

## 2023-11-29 PROBLEM — M10.9 GOUT: Status: ACTIVE | Noted: 2023-11-29

## 2023-11-29 PROBLEM — K74.60 HEPATIC CIRRHOSIS (HCC): Status: ACTIVE | Noted: 2023-11-29

## 2023-11-29 PROBLEM — F10.10 ALCOHOL ABUSE, DAILY USE: Status: ACTIVE | Noted: 2023-11-29

## 2023-11-29 PROBLEM — M17.0 PRIMARY OSTEOARTHRITIS OF BOTH KNEES: Status: ACTIVE | Noted: 2020-12-11

## 2023-11-29 PROBLEM — C09.9 MALIGNANT NEOPLASM OF TONSIL (HCC): Status: ACTIVE | Noted: 2023-11-09

## 2023-11-29 PROBLEM — C77.0 MALIGNANT NEOPLASM METASTATIC TO LYMPH NODE OF NECK (HCC): Status: ACTIVE | Noted: 2023-11-29

## 2023-11-29 PROBLEM — D31.31 BENIGN NEOPLASM OF CHOROID OF RIGHT EYE: Status: ACTIVE | Noted: 2023-04-04

## 2023-11-29 PROBLEM — F17.200 TOBACCO DEPENDENCE SYNDROME: Status: ACTIVE | Noted: 2020-09-28

## 2023-11-29 PROBLEM — E78.5 HYPERLIPIDEMIA: Status: ACTIVE | Noted: 2023-11-29

## 2023-12-01 ENCOUNTER — OFFICE VISIT (OUTPATIENT)
Dept: SURGERY | Age: 73
End: 2023-12-01
Payer: MEDICARE

## 2023-12-01 VITALS
HEART RATE: 74 BPM | SYSTOLIC BLOOD PRESSURE: 120 MMHG | DIASTOLIC BLOOD PRESSURE: 72 MMHG | BODY MASS INDEX: 30.8 KG/M2 | WEIGHT: 240 LBS

## 2023-12-01 DIAGNOSIS — J35.8 TONSILLAR MASS: Primary | ICD-10-CM

## 2023-12-01 PROCEDURE — 3017F COLORECTAL CA SCREEN DOC REV: CPT | Performed by: SURGERY

## 2023-12-01 PROCEDURE — 1123F ACP DISCUSS/DSCN MKR DOCD: CPT | Performed by: SURGERY

## 2023-12-01 PROCEDURE — 4004F PT TOBACCO SCREEN RCVD TLK: CPT | Performed by: SURGERY

## 2023-12-01 PROCEDURE — G8427 DOCREV CUR MEDS BY ELIG CLIN: HCPCS | Performed by: SURGERY

## 2023-12-01 PROCEDURE — G8417 CALC BMI ABV UP PARAM F/U: HCPCS | Performed by: SURGERY

## 2023-12-01 PROCEDURE — 3074F SYST BP LT 130 MM HG: CPT | Performed by: SURGERY

## 2023-12-01 PROCEDURE — G8484 FLU IMMUNIZE NO ADMIN: HCPCS | Performed by: SURGERY

## 2023-12-01 PROCEDURE — 99203 OFFICE O/P NEW LOW 30 MIN: CPT | Performed by: SURGERY

## 2023-12-01 PROCEDURE — 3078F DIAST BP <80 MM HG: CPT | Performed by: SURGERY

## 2023-12-04 ENCOUNTER — TELEPHONE (OUTPATIENT)
Dept: SURGERY | Age: 73
End: 2023-12-04

## 2023-12-04 NOTE — PROGRESS NOTES
PRE-OP INSTRUCTIONS FOR SURGICAL PATIENTS          Our Pre-admission Testing Nurses tried and were unable to reach you today. Please read the attached instructions if you did not listen to your voicemail. Follow all instructions provided to you from your surgeon's office, including your ARRIVAL TIME. Arrange for someone to drive you home and be with you for the first 24 hours after discharge. NOTE: at this time ONLY 2 ADULTS may accompany you   One person encouraged to stay at hospital entire time if outpatient surgery    Enter the MAIN entrance [facility] and report to the surgical desk on the LEFT side of the lobby. Please park in the parking garage or there is free [employer] available after 7am for your use. Bring your insurance card & photo ID with you to register. Bring your medication list with you with dose and frequency listed (including over the counter medications)  Contact your ordering physician/surgeon for medication instructions as soon as possible, especially if taking blood thinners, aspirin, heart, or diabetic medication. Bariatric surgical patients need to call your surgeon if on diabetic medications (as some may need to be stopped 1-week preop)  A Pre-Surgical History and Physical MUST be completed WITHIN 30 DAYS OR LESS prior to your procedure by your Physician or an Urgent Care. DO NOT EAT ANYTHING 8 hours prior to arrival for surgery. You may have sips of WATER ONLY (up to 8 ounces) 4 hours prior to your arrival for surgery. Then nothing further 4 hours prior to arriving at hospital. IF YOU ARE STILL TAKING ATENOLOL AND YOU TAKE IT IN THE MORNING, YOU MAY TAKE IT DAY OF SURGERY WITH A SIP OF WATER. PLEASE HOLD ANY OBUPROFEN PRODUCTS OVER THE COUNTER SUPPLEMENTS AND VITAMINS. NOTE: ALL Gastric, Bariatric & Bowel surgery patients - you MUST follow your surgeon's instructions regarding eating/drinking as you will have very specific instructions to follow.   If

## 2023-12-04 NOTE — TELEPHONE ENCOUNTER
Patient see on 12/1/23 surgery letter received for port and peg tube placement 1 hr OR time needed under MAC    Pre op instructions reviewed H&P to be done DOS  Pre op packet mailed     No post op appt needed     Faxed to scheduling     Placed on calendar and outlook

## 2023-12-04 NOTE — H&P (VIEW-ONLY)
PATIENT NAME: Valdo Avendaño     YOB: 1950     TODAY'S DATE: 12/4/2023    Reason for Visit:  [facility] referral for port and PEG placement    Requesting Physician:  [facility]    HISTORY OF PRESENT ILLNESS:  The patient is a 68 y.o. male with a PMHx as delineated below who presents with SCC of his tonsil has been referred to general surgery for port and PEG placement. The patient has never had abdominal surgery before and denies and current issues with eating. REVIEW OF SYSTEMS:  CONSTITUTIONAL:  negative  HEENT:  negative  RESPIRATORY:  negative  CARDIOVASCULAR:  negative  GASTROINTESTINAL:  negative  GENITOURINARY:  negative  HEMATOLOGIC/LYMPHATIC:  negative  MUSCULOSKELETAL: negative  NEUROLOGICAL:  negative    PMH  Past Medical History:   Diagnosis Date    Allergic rhinitis     Arthritis     Hepatitis C     Treated    History of meniscal tear     Right knee     Hyperlipidemia     Hypertension     Impaired fasting glucose     Rash     Tinnitus     TMJ dysfunction     Tonsillar mass     right       PSH  Past Surgical History:   Procedure Laterality Date    COLONOSCOPY  Aug., 2014 ( 2017 )    Dr. Muna Jorgensen - tubular adenomas ( 3 )    COLONOSCOPY  *Nov.26, 2019 ( 2024 )    Dr. Anais Ordoñez - tubular adenoma    MOUTH SURGERY Right 11/9/2023    BIOPSY RIGHT TONSIL, DIRECT LARYNGOSCOPY performed by Keila Gregory MD at [address] [place] [address] Drive History  Social History     Socioeconomic History    Marital status:      Spouse name: Not on file    Number of children: 0    Years of education: Not on file    Highest education level: Not on file   Occupational History    Occupation: Artist    Tobacco Use    Smoking status: Some Days     Packs/day: 1.00     Years: 40.00     Additional pack years: 0.00     Total pack years: 40.00     Types: Cigarettes    Smokeless tobacco: Never    Tobacco comments:     He is trying to quit.    Vaping Use    Vaping Use: Never used   Substance and Sexual Activity

## 2023-12-04 NOTE — PROGRESS NOTES
PATIENT NAME: Jase Archuleta     YOB: 1950     TODAY'S DATE: 12/4/2023    Reason for Visit:  [facility] referral for port and PEG placement    Requesting Physician:  [facility]    HISTORY OF PRESENT ILLNESS:  The patient is a 68 y.o. male with a PMHx as delineated below who presents with SCC of his tonsil has been referred to general surgery for port and PEG placement. The patient has never had abdominal surgery before and denies and current issues with eating. REVIEW OF SYSTEMS:  CONSTITUTIONAL:  negative  HEENT:  negative  RESPIRATORY:  negative  CARDIOVASCULAR:  negative  GASTROINTESTINAL:  negative  GENITOURINARY:  negative  HEMATOLOGIC/LYMPHATIC:  negative  MUSCULOSKELETAL: negative  NEUROLOGICAL:  negative    PMH  Past Medical History:   Diagnosis Date    Allergic rhinitis     Arthritis     Hepatitis C     Treated    History of meniscal tear     Right knee     Hyperlipidemia     Hypertension     Impaired fasting glucose     Rash     Tinnitus     TMJ dysfunction     Tonsillar mass     right       PSH  Past Surgical History:   Procedure Laterality Date    COLONOSCOPY  Aug., 2014 ( 2017 )    Dr. Markos Pollard - tubular adenomas ( 3 )    COLONOSCOPY  *Nov.26, 2019 ( 2024 )    Dr. Paul Saxena - tubular adenoma    MOUTH SURGERY Right 11/9/2023    BIOPSY RIGHT TONSIL, DIRECT LARYNGOSCOPY performed by Robert Vasquez MD at [address] [place] [address] Drive History  Social History     Socioeconomic History    Marital status:      Spouse name: Not on file    Number of children: 0    Years of education: Not on file    Highest education level: Not on file   Occupational History    Occupation: Artist    Tobacco Use    Smoking status: Some Days     Packs/day: 1.00     Years: 40.00     Additional pack years: 0.00     Total pack years: 40.00     Types: Cigarettes    Smokeless tobacco: Never    Tobacco comments:     He is trying to quit.    Vaping Use    Vaping Use: Never used   Substance and Sexual Activity

## 2023-12-06 ENCOUNTER — ANESTHESIA EVENT (OUTPATIENT)
Dept: OPERATING ROOM | Age: 73
End: 2023-12-06
Payer: MEDICARE

## 2023-12-07 ENCOUNTER — APPOINTMENT (OUTPATIENT)
Dept: GENERAL RADIOLOGY | Age: 73
End: 2023-12-07
Attending: SURGERY
Payer: MEDICARE

## 2023-12-07 ENCOUNTER — ANESTHESIA (OUTPATIENT)
Dept: OPERATING ROOM | Age: 73
End: 2023-12-07
Payer: MEDICARE

## 2023-12-07 ENCOUNTER — HOSPITAL ENCOUNTER (INPATIENT)
Age: 73
LOS: 1 days | Discharge: HOME HEALTH | End: 2023-12-08
Attending: SURGERY | Admitting: HOSPITALIST
Payer: MEDICARE

## 2023-12-07 DIAGNOSIS — J35.8 TONSILLAR MASS: ICD-10-CM

## 2023-12-07 DIAGNOSIS — G89.18 POST-OP PAIN: Primary | ICD-10-CM

## 2023-12-07 PROBLEM — I48.91 ATRIAL FIBRILLATION WITH RAPID VENTRICULAR RESPONSE (HCC): Status: ACTIVE | Noted: 2023-12-07

## 2023-12-07 PROBLEM — I48.0 PAROXYSMAL ATRIAL FIBRILLATION (HCC): Status: ACTIVE | Noted: 2023-12-07

## 2023-12-07 LAB
ALBUMIN SERPL-MCNC: 3.8 G/DL (ref 3.4–5)
ALBUMIN/GLOB SERPL: 1.3 {RATIO} (ref 1.1–2.2)
ALP SERPL-CCNC: 91 U/L (ref 40–129)
ALT SERPL-CCNC: 18 U/L (ref 10–40)
ANION GAP SERPL CALCULATED.3IONS-SCNC: 9 MMOL/L (ref 3–16)
AST SERPL-CCNC: 25 U/L (ref 15–37)
BASOPHILS # BLD: 0 K/UL (ref 0–0.2)
BASOPHILS NFR BLD: 0.4 %
BILIRUB SERPL-MCNC: 0.8 MG/DL (ref 0–1)
BUN SERPL-MCNC: 8 MG/DL (ref 7–20)
CALCIUM SERPL-MCNC: 8.7 MG/DL (ref 8.3–10.6)
CHLORIDE SERPL-SCNC: 96 MMOL/L (ref 99–110)
CO2 SERPL-SCNC: 24 MMOL/L (ref 21–32)
CREAT SERPL-MCNC: 0.9 MG/DL (ref 0.8–1.3)
DEPRECATED RDW RBC AUTO: 14.6 % (ref 12.4–15.4)
EKG ATRIAL RATE: 267 BPM
EKG DIAGNOSIS: NORMAL
EKG Q-T INTERVAL: 362 MS
EKG QRS DURATION: 86 MS
EKG QTC CALCULATION (BAZETT): 480 MS
EKG R AXIS: 34 DEGREES
EKG T AXIS: 14 DEGREES
EKG VENTRICULAR RATE: 106 BPM
EOSINOPHIL # BLD: 0.1 K/UL (ref 0–0.6)
EOSINOPHIL NFR BLD: 1.1 %
GFR SERPLBLD CREATININE-BSD FMLA CKD-EPI: >60 ML/MIN/{1.73_M2}
GLUCOSE SERPL-MCNC: 129 MG/DL (ref 70–99)
HCT VFR BLD AUTO: 36.1 % (ref 40.5–52.5)
HGB BLD-MCNC: 12.7 G/DL (ref 13.5–17.5)
LYMPHOCYTES # BLD: 1.4 K/UL (ref 1–5.1)
LYMPHOCYTES NFR BLD: 16 %
MCH RBC QN AUTO: 33.3 PG (ref 26–34)
MCHC RBC AUTO-ENTMCNC: 35.1 G/DL (ref 31–36)
MCV RBC AUTO: 94.9 FL (ref 80–100)
MONOCYTES # BLD: 0.6 K/UL (ref 0–1.3)
MONOCYTES NFR BLD: 7.3 %
NEUTROPHILS # BLD: 6.4 K/UL (ref 1.7–7.7)
NEUTROPHILS NFR BLD: 75.2 %
PLATELET # BLD AUTO: 160 K/UL (ref 135–450)
PMV BLD AUTO: 6.9 FL (ref 5–10.5)
POTASSIUM SERPL-SCNC: 4.3 MMOL/L (ref 3.5–5.1)
PROT SERPL-MCNC: 6.8 G/DL (ref 6.4–8.2)
RBC # BLD AUTO: 3.8 M/UL (ref 4.2–5.9)
SODIUM SERPL-SCNC: 129 MMOL/L (ref 136–145)
WBC # BLD AUTO: 8.5 K/UL (ref 4–11)

## 2023-12-07 PROCEDURE — 6370000000 HC RX 637 (ALT 250 FOR IP): Performed by: ANESTHESIOLOGY

## 2023-12-07 PROCEDURE — C1788 PORT, INDWELLING, IMP: HCPCS | Performed by: SURGERY

## 2023-12-07 PROCEDURE — 2580000003 HC RX 258

## 2023-12-07 PROCEDURE — 80053 COMPREHEN METABOLIC PANEL: CPT

## 2023-12-07 PROCEDURE — 7100000001 HC PACU RECOVERY - ADDTL 15 MIN: Performed by: SURGERY

## 2023-12-07 PROCEDURE — 2580000003 HC RX 258: Performed by: ANESTHESIOLOGY

## 2023-12-07 PROCEDURE — 6360000002 HC RX W HCPCS

## 2023-12-07 PROCEDURE — 6360000002 HC RX W HCPCS: Performed by: ANESTHESIOLOGY

## 2023-12-07 PROCEDURE — 2060000000 HC ICU INTERMEDIATE R&B

## 2023-12-07 PROCEDURE — 0DH63UZ INSERTION OF FEEDING DEVICE INTO STOMACH, PERCUTANEOUS APPROACH: ICD-10-PCS | Performed by: STUDENT IN AN ORGANIZED HEALTH CARE EDUCATION/TRAINING PROGRAM

## 2023-12-07 PROCEDURE — 2580000003 HC RX 258: Performed by: HOSPITALIST

## 2023-12-07 PROCEDURE — 2580000003 HC RX 258: Performed by: SURGERY

## 2023-12-07 PROCEDURE — 6360000002 HC RX W HCPCS: Performed by: SURGERY

## 2023-12-07 PROCEDURE — 02HV33Z INSERTION OF INFUSION DEVICE INTO SUPERIOR VENA CAVA, PERCUTANEOUS APPROACH: ICD-10-PCS | Performed by: SURGERY

## 2023-12-07 PROCEDURE — 36415 COLL VENOUS BLD VENIPUNCTURE: CPT

## 2023-12-07 PROCEDURE — 2500000003 HC RX 250 WO HCPCS

## 2023-12-07 PROCEDURE — 3600000012 HC SURGERY LEVEL 2 ADDTL 15MIN: Performed by: SURGERY

## 2023-12-07 PROCEDURE — 3700000000 HC ANESTHESIA ATTENDED CARE: Performed by: SURGERY

## 2023-12-07 PROCEDURE — 85025 COMPLETE CBC W/AUTO DIFF WBC: CPT

## 2023-12-07 PROCEDURE — 0JH60WZ INSERTION OF TOTALLY IMPLANTABLE VASCULAR ACCESS DEVICE INTO CHEST SUBCUTANEOUS TISSUE AND FASCIA, OPEN APPROACH: ICD-10-PCS | Performed by: SURGERY

## 2023-12-07 PROCEDURE — 77001 FLUOROGUIDE FOR VEIN DEVICE: CPT

## 2023-12-07 PROCEDURE — 2709999900 HC NON-CHARGEABLE SUPPLY: Performed by: SURGERY

## 2023-12-07 PROCEDURE — 6370000000 HC RX 637 (ALT 250 FOR IP): Performed by: HOSPITALIST

## 2023-12-07 PROCEDURE — 3700000001 HC ADD 15 MINUTES (ANESTHESIA): Performed by: SURGERY

## 2023-12-07 PROCEDURE — 3E0G76Z INTRODUCTION OF NUTRITIONAL SUBSTANCE INTO UPPER GI, VIA NATURAL OR ARTIFICIAL OPENING: ICD-10-PCS | Performed by: HOSPITALIST

## 2023-12-07 PROCEDURE — 99223 1ST HOSP IP/OBS HIGH 75: CPT | Performed by: INTERNAL MEDICINE

## 2023-12-07 PROCEDURE — 6360000002 HC RX W HCPCS: Performed by: HOSPITALIST

## 2023-12-07 PROCEDURE — 3600000002 HC SURGERY LEVEL 2 BASE: Performed by: SURGERY

## 2023-12-07 PROCEDURE — 71045 X-RAY EXAM CHEST 1 VIEW: CPT

## 2023-12-07 PROCEDURE — 7100000000 HC PACU RECOVERY - FIRST 15 MIN: Performed by: SURGERY

## 2023-12-07 DEVICE — PORT INFUS SGL LUMN ATTCH POLYUR OPN END CATH 8FR POWERPRT: Type: IMPLANTABLE DEVICE | Site: SUBCLAVIAN | Status: FUNCTIONAL

## 2023-12-07 RX ORDER — LORAZEPAM 2 MG/ML
2 INJECTION INTRAMUSCULAR
Status: DISCONTINUED | OUTPATIENT
Start: 2023-12-07 | End: 2023-12-08 | Stop reason: HOSPADM

## 2023-12-07 RX ORDER — LORAZEPAM 2 MG/ML
1 INJECTION INTRAMUSCULAR
Status: DISCONTINUED | OUTPATIENT
Start: 2023-12-07 | End: 2023-12-08 | Stop reason: HOSPADM

## 2023-12-07 RX ORDER — SODIUM CHLORIDE 9 MG/ML
INJECTION, SOLUTION INTRAVENOUS PRN
Status: DISCONTINUED | OUTPATIENT
Start: 2023-12-07 | End: 2023-12-08 | Stop reason: HOSPADM

## 2023-12-07 RX ORDER — ONDANSETRON 2 MG/ML
4 INJECTION INTRAMUSCULAR; INTRAVENOUS
Status: DISCONTINUED | OUTPATIENT
Start: 2023-12-07 | End: 2023-12-07 | Stop reason: HOSPADM

## 2023-12-07 RX ORDER — ATORVASTATIN CALCIUM 40 MG/1
40 TABLET, FILM COATED ORAL DAILY
Status: DISCONTINUED | OUTPATIENT
Start: 2023-12-07 | End: 2023-12-08 | Stop reason: HOSPADM

## 2023-12-07 RX ORDER — POLYETHYLENE GLYCOL 3350 17 G/17G
17 POWDER, FOR SOLUTION ORAL DAILY PRN
Status: DISCONTINUED | OUTPATIENT
Start: 2023-12-07 | End: 2023-12-08 | Stop reason: HOSPADM

## 2023-12-07 RX ORDER — LORAZEPAM 1 MG/1
4 TABLET ORAL
Status: DISCONTINUED | OUTPATIENT
Start: 2023-12-07 | End: 2023-12-08 | Stop reason: HOSPADM

## 2023-12-07 RX ORDER — LORAZEPAM 2 MG/ML
4 INJECTION INTRAMUSCULAR
Status: DISCONTINUED | OUTPATIENT
Start: 2023-12-07 | End: 2023-12-08 | Stop reason: HOSPADM

## 2023-12-07 RX ORDER — SODIUM CHLORIDE 0.9 % (FLUSH) 0.9 %
5-40 SYRINGE (ML) INJECTION EVERY 12 HOURS SCHEDULED
Status: DISCONTINUED | OUTPATIENT
Start: 2023-12-07 | End: 2023-12-08 | Stop reason: HOSPADM

## 2023-12-07 RX ORDER — SODIUM CHLORIDE 0.9 % (FLUSH) 0.9 %
5-40 SYRINGE (ML) INJECTION PRN
Status: DISCONTINUED | OUTPATIENT
Start: 2023-12-07 | End: 2023-12-08 | Stop reason: HOSPADM

## 2023-12-07 RX ORDER — ALLOPURINOL 300 MG/1
300 TABLET ORAL DAILY
Status: DISCONTINUED | OUTPATIENT
Start: 2023-12-07 | End: 2023-12-08 | Stop reason: HOSPADM

## 2023-12-07 RX ORDER — BUPIVACAINE HYDROCHLORIDE 5 MG/ML
INJECTION, SOLUTION EPIDURAL; INTRACAUDAL PRN
Status: DISCONTINUED | OUTPATIENT
Start: 2023-12-07 | End: 2023-12-07 | Stop reason: ALTCHOICE

## 2023-12-07 RX ORDER — HYDROMORPHONE HYDROCHLORIDE 1 MG/ML
0.5 INJECTION, SOLUTION INTRAMUSCULAR; INTRAVENOUS; SUBCUTANEOUS ONCE
Status: COMPLETED | OUTPATIENT
Start: 2023-12-07 | End: 2023-12-07

## 2023-12-07 RX ORDER — SODIUM CHLORIDE 9 MG/ML
INJECTION, SOLUTION INTRAVENOUS PRN
Status: DISCONTINUED | OUTPATIENT
Start: 2023-12-07 | End: 2023-12-07 | Stop reason: HOSPADM

## 2023-12-07 RX ORDER — ACETAMINOPHEN 650 MG/1
650 SUPPOSITORY RECTAL EVERY 6 HOURS PRN
Status: DISCONTINUED | OUTPATIENT
Start: 2023-12-07 | End: 2023-12-08 | Stop reason: HOSPADM

## 2023-12-07 RX ORDER — OXYCODONE HYDROCHLORIDE 5 MG/1
5 TABLET ORAL EVERY 6 HOURS PRN
Qty: 5 TABLET | Refills: 0 | Status: SHIPPED | OUTPATIENT
Start: 2023-12-07 | End: 2023-12-08 | Stop reason: SDUPTHER

## 2023-12-07 RX ORDER — PROCHLORPERAZINE EDISYLATE 5 MG/ML
5 INJECTION INTRAMUSCULAR; INTRAVENOUS
Status: DISCONTINUED | OUTPATIENT
Start: 2023-12-07 | End: 2023-12-07 | Stop reason: HOSPADM

## 2023-12-07 RX ORDER — MEPERIDINE HYDROCHLORIDE 25 MG/ML
12.5 INJECTION INTRAMUSCULAR; INTRAVENOUS; SUBCUTANEOUS EVERY 5 MIN PRN
Status: DISCONTINUED | OUTPATIENT
Start: 2023-12-07 | End: 2023-12-07 | Stop reason: HOSPADM

## 2023-12-07 RX ORDER — ONDANSETRON 2 MG/ML
INJECTION INTRAMUSCULAR; INTRAVENOUS PRN
Status: DISCONTINUED | OUTPATIENT
Start: 2023-12-07 | End: 2023-12-07 | Stop reason: SDUPTHER

## 2023-12-07 RX ORDER — OXYCODONE HYDROCHLORIDE 5 MG/1
10 TABLET ORAL EVERY 4 HOURS PRN
Status: DISCONTINUED | OUTPATIENT
Start: 2023-12-07 | End: 2023-12-08 | Stop reason: HOSPADM

## 2023-12-07 RX ORDER — SODIUM CHLORIDE, SODIUM LACTATE, POTASSIUM CHLORIDE, CALCIUM CHLORIDE 600; 310; 30; 20 MG/100ML; MG/100ML; MG/100ML; MG/100ML
INJECTION, SOLUTION INTRAVENOUS CONTINUOUS
Status: DISCONTINUED | OUTPATIENT
Start: 2023-12-07 | End: 2023-12-07 | Stop reason: HOSPADM

## 2023-12-07 RX ORDER — SODIUM CHLORIDE 9 MG/ML
INJECTION, SOLUTION INTRAVENOUS CONTINUOUS PRN
Status: DISCONTINUED | OUTPATIENT
Start: 2023-12-07 | End: 2023-12-07 | Stop reason: HOSPADM

## 2023-12-07 RX ORDER — HEPARIN 100 UNIT/ML
SYRINGE INTRAVENOUS PRN
Status: DISCONTINUED | OUTPATIENT
Start: 2023-12-07 | End: 2023-12-07 | Stop reason: HOSPADM

## 2023-12-07 RX ORDER — PROPOFOL 10 MG/ML
INJECTION, EMULSION INTRAVENOUS CONTINUOUS PRN
Status: DISCONTINUED | OUTPATIENT
Start: 2023-12-07 | End: 2023-12-07 | Stop reason: SDUPTHER

## 2023-12-07 RX ORDER — HYDROMORPHONE HYDROCHLORIDE 1 MG/ML
0.5 INJECTION, SOLUTION INTRAMUSCULAR; INTRAVENOUS; SUBCUTANEOUS EVERY 5 MIN PRN
Status: COMPLETED | OUTPATIENT
Start: 2023-12-07 | End: 2023-12-07

## 2023-12-07 RX ORDER — FENTANYL CITRATE 50 UG/ML
INJECTION, SOLUTION INTRAMUSCULAR; INTRAVENOUS PRN
Status: DISCONTINUED | OUTPATIENT
Start: 2023-12-07 | End: 2023-12-07 | Stop reason: SDUPTHER

## 2023-12-07 RX ORDER — LIDOCAINE HYDROCHLORIDE 20 MG/ML
INJECTION, SOLUTION INFILTRATION; PERINEURAL PRN
Status: DISCONTINUED | OUTPATIENT
Start: 2023-12-07 | End: 2023-12-07 | Stop reason: SDUPTHER

## 2023-12-07 RX ORDER — OXYCODONE HYDROCHLORIDE 5 MG/1
5 TABLET ORAL EVERY 4 HOURS PRN
Status: DISCONTINUED | OUTPATIENT
Start: 2023-12-07 | End: 2023-12-08 | Stop reason: HOSPADM

## 2023-12-07 RX ORDER — LORAZEPAM 2 MG/ML
3 INJECTION INTRAMUSCULAR
Status: DISCONTINUED | OUTPATIENT
Start: 2023-12-07 | End: 2023-12-08 | Stop reason: HOSPADM

## 2023-12-07 RX ORDER — LABETALOL HYDROCHLORIDE 5 MG/ML
10 INJECTION, SOLUTION INTRAVENOUS
Status: DISCONTINUED | OUTPATIENT
Start: 2023-12-07 | End: 2023-12-07 | Stop reason: HOSPADM

## 2023-12-07 RX ORDER — MIDAZOLAM HYDROCHLORIDE 1 MG/ML
INJECTION INTRAMUSCULAR; INTRAVENOUS PRN
Status: DISCONTINUED | OUTPATIENT
Start: 2023-12-07 | End: 2023-12-07 | Stop reason: SDUPTHER

## 2023-12-07 RX ORDER — SODIUM CHLORIDE, SODIUM LACTATE, POTASSIUM CHLORIDE, CALCIUM CHLORIDE 600; 310; 30; 20 MG/100ML; MG/100ML; MG/100ML; MG/100ML
INJECTION, SOLUTION INTRAVENOUS CONTINUOUS PRN
Status: DISCONTINUED | OUTPATIENT
Start: 2023-12-07 | End: 2023-12-07 | Stop reason: SDUPTHER

## 2023-12-07 RX ORDER — SODIUM CHLORIDE 0.9 % (FLUSH) 0.9 %
5-40 SYRINGE (ML) INJECTION EVERY 12 HOURS SCHEDULED
Status: DISCONTINUED | OUTPATIENT
Start: 2023-12-07 | End: 2023-12-07 | Stop reason: HOSPADM

## 2023-12-07 RX ORDER — KETAMINE HYDROCHLORIDE 50 MG/ML
INJECTION, SOLUTION, CONCENTRATE INTRAMUSCULAR; INTRAVENOUS PRN
Status: DISCONTINUED | OUTPATIENT
Start: 2023-12-07 | End: 2023-12-07 | Stop reason: SDUPTHER

## 2023-12-07 RX ORDER — PROPOFOL 10 MG/ML
INJECTION, EMULSION INTRAVENOUS PRN
Status: DISCONTINUED | OUTPATIENT
Start: 2023-12-07 | End: 2023-12-07 | Stop reason: SDUPTHER

## 2023-12-07 RX ORDER — LORAZEPAM 0.5 MG/1
2 TABLET ORAL
Status: DISCONTINUED | OUTPATIENT
Start: 2023-12-07 | End: 2023-12-08 | Stop reason: HOSPADM

## 2023-12-07 RX ORDER — OXYCODONE HYDROCHLORIDE 5 MG/1
5 TABLET ORAL
Status: COMPLETED | OUTPATIENT
Start: 2023-12-07 | End: 2023-12-07

## 2023-12-07 RX ORDER — GAUZE BANDAGE 2" X 2"
100 BANDAGE TOPICAL DAILY
Status: DISCONTINUED | OUTPATIENT
Start: 2023-12-07 | End: 2023-12-08 | Stop reason: HOSPADM

## 2023-12-07 RX ORDER — ONDANSETRON 4 MG/1
4 TABLET, ORALLY DISINTEGRATING ORAL EVERY 8 HOURS PRN
Status: DISCONTINUED | OUTPATIENT
Start: 2023-12-07 | End: 2023-12-08 | Stop reason: HOSPADM

## 2023-12-07 RX ORDER — ACETAMINOPHEN 325 MG/1
650 TABLET ORAL EVERY 6 HOURS PRN
Status: DISCONTINUED | OUTPATIENT
Start: 2023-12-07 | End: 2023-12-08 | Stop reason: HOSPADM

## 2023-12-07 RX ORDER — METHOCARBAMOL 750 MG/1
1500 TABLET, FILM COATED ORAL 4 TIMES DAILY
Status: DISCONTINUED | OUTPATIENT
Start: 2023-12-07 | End: 2023-12-08

## 2023-12-07 RX ORDER — ONDANSETRON 2 MG/ML
4 INJECTION INTRAMUSCULAR; INTRAVENOUS EVERY 6 HOURS PRN
Status: DISCONTINUED | OUTPATIENT
Start: 2023-12-07 | End: 2023-12-08 | Stop reason: HOSPADM

## 2023-12-07 RX ORDER — LORAZEPAM 0.5 MG/1
1 TABLET ORAL
Status: DISCONTINUED | OUTPATIENT
Start: 2023-12-07 | End: 2023-12-08 | Stop reason: HOSPADM

## 2023-12-07 RX ORDER — ONDANSETRON 2 MG/ML
4 INJECTION INTRAMUSCULAR; INTRAVENOUS EVERY 6 HOURS PRN
Status: DISCONTINUED | OUTPATIENT
Start: 2023-12-07 | End: 2023-12-07 | Stop reason: SDUPTHER

## 2023-12-07 RX ORDER — LORAZEPAM 1 MG/1
3 TABLET ORAL
Status: DISCONTINUED | OUTPATIENT
Start: 2023-12-07 | End: 2023-12-08 | Stop reason: HOSPADM

## 2023-12-07 RX ORDER — FUROSEMIDE 20 MG/1
TABLET ORAL
Qty: 30 TABLET | Refills: 2 | Status: SHIPPED | OUTPATIENT
Start: 2023-12-07

## 2023-12-07 RX ORDER — HYDRALAZINE HYDROCHLORIDE 20 MG/ML
10 INJECTION INTRAMUSCULAR; INTRAVENOUS
Status: DISCONTINUED | OUTPATIENT
Start: 2023-12-07 | End: 2023-12-07 | Stop reason: HOSPADM

## 2023-12-07 RX ORDER — ESMOLOL HYDROCHLORIDE 10 MG/ML
INJECTION INTRAVENOUS PRN
Status: DISCONTINUED | OUTPATIENT
Start: 2023-12-07 | End: 2023-12-07 | Stop reason: SDUPTHER

## 2023-12-07 RX ORDER — METOPROLOL TARTRATE 1 MG/ML
5 INJECTION, SOLUTION INTRAVENOUS ONCE
Status: COMPLETED | OUTPATIENT
Start: 2023-12-07 | End: 2023-12-07

## 2023-12-07 RX ORDER — HYDROMORPHONE HYDROCHLORIDE 1 MG/ML
0.25 INJECTION, SOLUTION INTRAMUSCULAR; INTRAVENOUS; SUBCUTANEOUS ONCE
Status: DISCONTINUED | OUTPATIENT
Start: 2023-12-07 | End: 2023-12-07 | Stop reason: SDUPTHER

## 2023-12-07 RX ORDER — SODIUM CHLORIDE 0.9 % (FLUSH) 0.9 %
5-40 SYRINGE (ML) INJECTION PRN
Status: DISCONTINUED | OUTPATIENT
Start: 2023-12-07 | End: 2023-12-07 | Stop reason: HOSPADM

## 2023-12-07 RX ORDER — SODIUM CHLORIDE 9 MG/ML
INJECTION, SOLUTION INTRAVENOUS CONTINUOUS
Status: ACTIVE | OUTPATIENT
Start: 2023-12-07 | End: 2023-12-08

## 2023-12-07 RX ADMIN — HYDROMORPHONE HYDROCHLORIDE 0.5 MG: 1 INJECTION, SOLUTION INTRAMUSCULAR; INTRAVENOUS; SUBCUTANEOUS at 11:00

## 2023-12-07 RX ADMIN — PROPOFOL 120 MCG/KG/MIN: 10 INJECTION, EMULSION INTRAVENOUS at 07:32

## 2023-12-07 RX ADMIN — KETAMINE HYDROCHLORIDE 20 MG: 50 INJECTION, SOLUTION INTRAMUSCULAR; INTRAVENOUS at 07:33

## 2023-12-07 RX ADMIN — PHENYLEPHRINE HYDROCHLORIDE 200 MCG: 10 INJECTION, SOLUTION INTRAMUSCULAR; INTRAVENOUS; SUBCUTANEOUS at 08:10

## 2023-12-07 RX ADMIN — AMIODARONE HYDROCHLORIDE 150 MG: 50 INJECTION, SOLUTION INTRAVENOUS at 13:47

## 2023-12-07 RX ADMIN — SODIUM CHLORIDE, POTASSIUM CHLORIDE, SODIUM LACTATE AND CALCIUM CHLORIDE: 600; 310; 30; 20 INJECTION, SOLUTION INTRAVENOUS at 06:35

## 2023-12-07 RX ADMIN — ALLOPURINOL 300 MG: 300 TABLET ORAL at 20:58

## 2023-12-07 RX ADMIN — SODIUM CHLORIDE 2000 MG: 900 INJECTION INTRAVENOUS at 07:33

## 2023-12-07 RX ADMIN — PHENYLEPHRINE HYDROCHLORIDE 200 MCG: 10 INJECTION, SOLUTION INTRAMUSCULAR; INTRAVENOUS; SUBCUTANEOUS at 07:56

## 2023-12-07 RX ADMIN — LIDOCAINE HYDROCHLORIDE 100 MG: 20 INJECTION, SOLUTION INFILTRATION; PERINEURAL at 07:31

## 2023-12-07 RX ADMIN — PHENYLEPHRINE HYDROCHLORIDE 200 MCG: 10 INJECTION, SOLUTION INTRAMUSCULAR; INTRAVENOUS; SUBCUTANEOUS at 08:33

## 2023-12-07 RX ADMIN — Medication 100 MG: at 22:14

## 2023-12-07 RX ADMIN — FENTANYL CITRATE 50 MCG: 50 INJECTION, SOLUTION INTRAMUSCULAR; INTRAVENOUS at 07:35

## 2023-12-07 RX ADMIN — METOPROLOL TARTRATE 5 MG: 1 INJECTION, SOLUTION INTRAVENOUS at 10:50

## 2023-12-07 RX ADMIN — AMIODARONE HYDROCHLORIDE 1 MG/MIN: 50 INJECTION, SOLUTION INTRAVENOUS at 18:06

## 2023-12-07 RX ADMIN — ESMOLOL HYDROCHLORIDE 20 MG: 10 INJECTION, SOLUTION INTRAVENOUS at 08:07

## 2023-12-07 RX ADMIN — OXYCODONE 5 MG: 5 TABLET ORAL at 13:00

## 2023-12-07 RX ADMIN — SODIUM CHLORIDE, SODIUM LACTATE, POTASSIUM CHLORIDE, AND CALCIUM CHLORIDE: .6; .31; .03; .02 INJECTION, SOLUTION INTRAVENOUS at 08:13

## 2023-12-07 RX ADMIN — PHENYLEPHRINE HYDROCHLORIDE 100 MCG: 10 INJECTION, SOLUTION INTRAMUSCULAR; INTRAVENOUS; SUBCUTANEOUS at 08:20

## 2023-12-07 RX ADMIN — HYDROMORPHONE HYDROCHLORIDE 0.5 MG: 1 INJECTION, SOLUTION INTRAMUSCULAR; INTRAVENOUS; SUBCUTANEOUS at 11:30

## 2023-12-07 RX ADMIN — LIDOCAINE HYDROCHLORIDE 100 MG: 20 INJECTION, SOLUTION INFILTRATION; PERINEURAL at 07:56

## 2023-12-07 RX ADMIN — PROPOFOL 50 MG: 10 INJECTION, EMULSION INTRAVENOUS at 07:31

## 2023-12-07 RX ADMIN — PHENYLEPHRINE HYDROCHLORIDE 100 MCG: 10 INJECTION, SOLUTION INTRAMUSCULAR; INTRAVENOUS; SUBCUTANEOUS at 07:43

## 2023-12-07 RX ADMIN — HYDROMORPHONE HYDROCHLORIDE 0.5 MG: 1 INJECTION, SOLUTION INTRAMUSCULAR; INTRAVENOUS; SUBCUTANEOUS at 16:00

## 2023-12-07 RX ADMIN — METHOCARBAMOL 1500 MG: 750 TABLET ORAL at 20:58

## 2023-12-07 RX ADMIN — PHENYLEPHRINE HYDROCHLORIDE 200 MCG: 10 INJECTION, SOLUTION INTRAMUSCULAR; INTRAVENOUS; SUBCUTANEOUS at 08:05

## 2023-12-07 RX ADMIN — KETAMINE HYDROCHLORIDE 10 MG: 50 INJECTION, SOLUTION INTRAMUSCULAR; INTRAVENOUS at 07:52

## 2023-12-07 RX ADMIN — FENTANYL CITRATE 50 MCG: 50 INJECTION, SOLUTION INTRAMUSCULAR; INTRAVENOUS at 07:38

## 2023-12-07 RX ADMIN — ATORVASTATIN CALCIUM 40 MG: 40 TABLET, FILM COATED ORAL at 20:58

## 2023-12-07 RX ADMIN — ONDANSETRON 4 MG: 2 INJECTION INTRAMUSCULAR; INTRAVENOUS at 08:47

## 2023-12-07 RX ADMIN — PHENYLEPHRINE HYDROCHLORIDE 100 MCG: 10 INJECTION, SOLUTION INTRAMUSCULAR; INTRAVENOUS; SUBCUTANEOUS at 08:17

## 2023-12-07 RX ADMIN — PHENYLEPHRINE HYDROCHLORIDE 100 MCG: 10 INJECTION, SOLUTION INTRAMUSCULAR; INTRAVENOUS; SUBCUTANEOUS at 08:30

## 2023-12-07 RX ADMIN — HYDROMORPHONE HYDROCHLORIDE 0.5 MG: 1 INJECTION, SOLUTION INTRAMUSCULAR; INTRAVENOUS; SUBCUTANEOUS at 09:30

## 2023-12-07 RX ADMIN — HYDROMORPHONE HYDROCHLORIDE 0.5 MG: 1 INJECTION, SOLUTION INTRAMUSCULAR; INTRAVENOUS; SUBCUTANEOUS at 10:00

## 2023-12-07 RX ADMIN — MEPERIDINE HYDROCHLORIDE 12.5 MG: 25 INJECTION INTRAMUSCULAR; INTRAVENOUS; SUBCUTANEOUS at 13:15

## 2023-12-07 RX ADMIN — METHOCARBAMOL 1000 MG: 100 INJECTION INTRAMUSCULAR; INTRAVENOUS at 11:46

## 2023-12-07 RX ADMIN — MIDAZOLAM HYDROCHLORIDE 2 MG: 2 INJECTION, SOLUTION INTRAMUSCULAR; INTRAVENOUS at 07:24

## 2023-12-07 RX ADMIN — KETAMINE HYDROCHLORIDE 10 MG: 50 INJECTION, SOLUTION INTRAMUSCULAR; INTRAVENOUS at 07:48

## 2023-12-07 RX ADMIN — SODIUM CHLORIDE, SODIUM LACTATE, POTASSIUM CHLORIDE, AND CALCIUM CHLORIDE: .6; .31; .03; .02 INJECTION, SOLUTION INTRAVENOUS at 07:26

## 2023-12-07 RX ADMIN — AMIODARONE HYDROCHLORIDE 150 MG: 50 INJECTION, SOLUTION INTRAVENOUS at 11:11

## 2023-12-07 RX ADMIN — SODIUM CHLORIDE, PRESERVATIVE FREE 10 ML: 5 INJECTION INTRAVENOUS at 20:59

## 2023-12-07 RX ADMIN — ESMOLOL HYDROCHLORIDE 20 MG: 10 INJECTION, SOLUTION INTRAVENOUS at 08:03

## 2023-12-07 ASSESSMENT — PAIN DESCRIPTION - DESCRIPTORS
DESCRIPTORS: STABBING
DESCRIPTORS: STABBING
DESCRIPTORS_2: ACHING;THROBBING
DESCRIPTORS: STABBING
DESCRIPTORS: ACHING;THROBBING
DESCRIPTORS: THROBBING;ACHING

## 2023-12-07 ASSESSMENT — PAIN DESCRIPTION - FREQUENCY
FREQUENCY: CONTINUOUS
FREQUENCY: INTERMITTENT
FREQUENCY: INTERMITTENT

## 2023-12-07 ASSESSMENT — PAIN DESCRIPTION - DIRECTION: RADIATING_TOWARDS: NECK

## 2023-12-07 ASSESSMENT — ENCOUNTER SYMPTOMS
VOMITING: 0
NAUSEA: 0
SHORTNESS OF BREATH: 0
CHEST TIGHTNESS: 0
COUGH: 0

## 2023-12-07 ASSESSMENT — PAIN - FUNCTIONAL ASSESSMENT
PAIN_FUNCTIONAL_ASSESSMENT: ACTIVITIES ARE NOT PREVENTED
PAIN_FUNCTIONAL_ASSESSMENT: 0-10
PAIN_FUNCTIONAL_ASSESSMENT: ACTIVITIES ARE NOT PREVENTED
PAIN_FUNCTIONAL_ASSESSMENT_SITE2: ACTIVITIES ARE NOT PREVENTED

## 2023-12-07 ASSESSMENT — PAIN SCALES - GENERAL
PAINLEVEL_OUTOF10: 8
PAINLEVEL_OUTOF10: 3
PAINLEVEL_OUTOF10: 0
PAINLEVEL_OUTOF10: 2
PAINLEVEL_OUTOF10: 8
PAINLEVEL_OUTOF10: 3
PAINLEVEL_OUTOF10: 8
PAINLEVEL_OUTOF10: 6
PAINLEVEL_OUTOF10: 7

## 2023-12-07 ASSESSMENT — PAIN DESCRIPTION - ORIENTATION
ORIENTATION_2: LEFT
ORIENTATION: RIGHT;LEFT
ORIENTATION: LEFT
ORIENTATION: RIGHT;LEFT
ORIENTATION: LEFT
ORIENTATION: RIGHT;LEFT

## 2023-12-07 ASSESSMENT — PAIN DESCRIPTION - LOCATION
LOCATION: ABDOMEN
LOCATION: ABDOMEN
LOCATION: CHEST;SHOULDER
LOCATION: ABDOMEN
LOCATION: ABDOMEN
LOCATION_2: ABDOMEN

## 2023-12-07 ASSESSMENT — PAIN DESCRIPTION - PAIN TYPE
TYPE: SURGICAL PAIN

## 2023-12-07 ASSESSMENT — PAIN DESCRIPTION - ONSET
ONSET: ON-GOING

## 2023-12-07 ASSESSMENT — LIFESTYLE VARIABLES: SMOKING_STATUS: 1

## 2023-12-07 ASSESSMENT — PAIN DESCRIPTION - INTENSITY
RATING_2: 3
RATING_2: 6

## 2023-12-07 NOTE — PROGRESS NOTES
PORT-A-CATHETER PLACEMENT;  Monitor Anesthesia Care  PERCUTANEOUS ENDOSCOPIC GASTROSTOMY TUBE INSERTION, Monitor Anesthesia Care  Dr Reta Galeas    Current Allergies: No known allergies    No results for input(s): \"POCGLU\" in the last 72 hours. Admitted to PACU bed 15 from OR. Arrived on a stretcher. Patient to be discharged to home. Attached to PACU monitoring system. Alarms and parameters set. Report received from anesthesia personnel. [address] staff did not report skin issues that were observed while in OR, or if admitted with skin issue. No problems reported intraoperatively. Pt arrived with no oxygen on   Athrombic wraps in place. Removed for discharge    Surgical site to the left upper chest and left upper abdomen. Closed with surgical glue on the chest and G-tube placement with abdominal binder in place    Stat EKG and Chest X--ray ordered. Doctors aware of all labs and diagnostics before coming to recovery.

## 2023-12-07 NOTE — PROGRESS NOTES
PORT-A-CATHETER PLACEMENT;  Monitor Anesthesia Care  PERCUTANEOUS ENDOSCOPIC GASTROSTOMY TUBE INSERTION, Monitor Anesthesia Care  Dr Rob Kaba    Chest X--ray here for film  EKG here for test    Results in for chest X--ray   FINDINGS:  Left subclavian portacatheter with distal tip at the cavoatrial junction in  satisfactory position  Trachea is midline. Heart is normal in size  Aortic tortuosity     Lungs are clear without active pulmonary opacities or effusion. No evidence of pneumothorax     IMPRESSION:  1. Satisfactory position of placement of left squeaking portacatheter. 2. No pneumothorax.

## 2023-12-07 NOTE — INTERVAL H&P NOTE
Update History & Physical    The patient's History and Physical of December 1,  was reviewed with the patient and I examined the patient. There was no change. The surgical site was confirmed by the patient and me. Plan: The risks, benefits, expected outcome, and alternative to the recommended procedure have been discussed with the patient. Patient understands and wants to proceed with the procedure.      Electronically signed by Usha Hutchinson DO on 12/7/2023 at 5:55 AM

## 2023-12-07 NOTE — H&P
V2.0  History and Physical      Name:  Lorena Salazar /Age/Sex: 1950  (68 y.o. male)   MRN & CSN:  [id number] & [id number] Encounter Date/Time: 2023 4:38 PM EST   Location:  OR/NONE PCP: Zo Douglsas MD       Hospital Day: 1    Assessment and Plan:   Lorena Salazar is a 68 y.o. male with pmh of gout, HTN, HLD, tobacco abusem obesity, alcoholic cirrhosis, BPH and a recent diagnosis of tonsillar cancer who was in the hospital for PEG tube placement. Post op he was found in new Afib with RVR. He was given a dose of metoprolol and amiodarone but did not convert so it was decided to admit him. New onset Afib with RVR  -Echo. TSH. CBC. CMP  -BP is still soft. Will start amio drip. Hold atenolol , lasix , spirolactone and lisinopril for now   -Gentle hydration   -CHADVASc is 2. Will hold on [facility] [place] in the setting of recent procedure. Cardiology were planning on outpt [facility] to assess Afib burden and need for long term [address]     Alcohol abuse - I'm worried about withdrawal. Will start him on CIWA    New diagnosis of squamous cell carcinoma of the tonsills S/P PEG tube placement   Recent COVID infection last month, treated with Paxlovid  Gout  HTN  HLD  Tobacco abuse- still smokes. Cont counseling   Obesity  Alcoholic cirrhosis  BPH  -Cont other home meds as able       Disposition:   Current Living situation: home  Expected Disposition: home  Estimated D/C: 2 days    Diet ADULT DIET;  Regular   DVT Prophylaxis [] Lovenox, []  Heparin, [] SCDs, [] Ambulation,  [] Eliquis, [] Xarelto, [] Coumadin   Code Status Full Code   Surrogate Decision Maker/ POA      Personally reviewed Lab Studies and Imaging     Discussed management of the case with surg who recommended admission    EKG interpreted personally and results Afib with RVR    Drugs that require monitoring for toxicity include amio drip and the method of monitoring was heart monitoring         History from:     patient    History of Present Illness: History    Marital status:      Spouse name: None    Number of children: 0    Years of education: None    Highest education level: None   Occupational History    Occupation: Artist    Tobacco Use    Smoking status: Every Day     Packs/day: 0.25     Years: 40.00     Additional pack years: 0.00     Total pack years: 10.00     Types: Cigarettes    Smokeless tobacco: Never    Tobacco comments:     He is trying to quit. - 2-3 cigarettes/day   Vaping Use    Vaping Use: Never used   Substance and Sexual Activity    Alcohol use: Yes     Comment: 2vodka/soda or wine 2-5 glasses per week. no beers    Drug use: Yes     Types: Marijuana Emy Gann)     Comment: med marijuana--Gummy for sleep/pain prn    Sexual activity: Not Currently   Social History Narrative    Living Will:  No              Social Determinants of Health     Financial Resource Strain: Low Risk  (3/28/2023)    Overall Financial Resource Strain (CARDIA)     Difficulty of Paying Living Expenses: Not hard at all   Transportation Needs: Unknown (3/28/2023)    PRAPARE - Transportation     Lack of Transportation (Non-Medical):  No   Physical Activity: Sufficiently Active (10/5/2023)    Exercise Vital Sign     Days of Exercise per Week: 3 days     Minutes of Exercise per Session: 120 min   Housing Stability: Unknown (3/28/2023)    Housing Stability Vital Sign     Unstable Housing in the Last Year: No       Medications:   Medications:    sodium chloride flush  5-40 mL IntraVENous 2 times per day    methocarbamol  1,500 mg Oral 4x Daily    allopurinol  300 mg Oral Daily    atorvastatin  40 mg Oral Daily    sodium chloride flush  5-40 mL IntraVENous 2 times per day    sodium chloride flush  5-40 mL IntraVENous 2 times per day    thiamine  100 mg Oral Daily      Infusions:    sodium chloride      amiodarone      Followed by    amiodarone      sodium chloride      sodium chloride      sodium chloride       PRN Meds: sodium chloride flush, 5-40 mL, PRN  sodium chloride, ,

## 2023-12-07 NOTE — ANESTHESIA POSTPROCEDURE EVALUATION
Department of Anesthesiology  Postprocedure Note    Patient: Lorena Salazar  MRN: [id number]  YOB: 1950  Date of evaluation: 12/7/2023      Procedure Summary       Date: 12/07/23 Room / Location: [address] [place] [address]    Anesthesia Start: 6753 Anesthesia Stop: 5927    Procedures:       PORT-A-CATHETER PLACEMENT;      PERCUTANEOUS ENDOSCOPIC GASTROSTOMY TUBE INSERTION Diagnosis:       Malignant neoplasm of tonsil ([address])      (Malignant neoplasm of tonsil ([address]) [C09.9])    Surgeons: Willy Ye MD Responsible Provider: Nicole Jensen MD    Anesthesia Type: MAC ASA Status: 4            Anesthesia Type: MAC    Jose Phase I: Jose Score: 9    Jose Phase II:        Anesthesia Post Evaluation    Patient location during evaluation: PACU  Patient participation: complete - patient participated  Level of consciousness: awake and alert  Airway patency: patent  Nausea & Vomiting: no nausea and no vomiting  Complications: no  Cardiovascular status: hemodynamically stable  Respiratory status: acceptable  Hydration status: euvolemic  Multimodal analgesia pain management approach  Pain management: satisfactory to patient

## 2023-12-07 NOTE — BRIEF OP NOTE
Brief Postoperative Note      Patient: Colonel Schumacher  YOB: 1950  MRN: [id number]    Date of Procedure: 12/7/2023    Pre-Op Diagnosis Codes:     * Malignant neoplasm of tonsil ([address]) [C09.9]    Post-Op Diagnosis: Same       Procedure(s):  PORT-A-CATHETER PLACEMENT;  PERCUTANEOUS ENDOSCOPIC GASTROSTOMY TUBE INSERTION    Surgeon(s):  Patricia Jimenez MD    Assistant:  Resident: DO Brian Noriega DO    Anesthesia: Monitor Anesthesia Care    Estimated Blood Loss (mL): 20    Complications: None    Specimens: None    Implants:  Implant Name Type Inv. Item Serial No.  Lot No. LRB No. Used Action   PORT INFUS SGL LUMN ATTCH POLYUR OPN END CATH 8FR POWERPRT - [id number]  PORT INFUS SGL LUMN ATTCH POLYUR OPN END CATH 8FR POWERPRT  [employer] AND [employer] [id number] Left 1 Implanted         Drains: None    Findings: Left subclavian port placement, tip of catheter in distal SVC on fluoro. Port flushes/draws well. 20 Fr PEG placement, 4 cm at skin 5.5 cm at bumper. Patient tachycardic throughout case - concern for A fib on telemetry, oxygen saturation stable.        Electronically signed by Brian El DO on 12/7/2023 at 8:50 AM

## 2023-12-07 NOTE — DISCHARGE INSTRUCTIONS
[id number]  [phone]         Make sure to save box as you will place monitor back in postage paid box to return to company. After removing monitor stick it to template provided, place both your log and monitor in box and place in mailbox. If monitor comes off but has been in place at least 7 days place in box and mail back. If it comes off sooner than 7 days you will have to call office and return to have it replaced. Avoid excess sweating to maximize wear time. You are able to shower after 24 hours, however have majority of water hitting back and not directly on monitor. Do not submerge in bath. If you experience any symptoms while wearing monitor push button and record in booklet. For questions about monitor call: Customer Service [phone]                                 .

## 2023-12-07 NOTE — PROGRESS NOTES
Dr Fiona Horvath, Cardiology at bedside    Dr Jigar Morales Cardiology at bedside  New orders    12:00 contacted to see what other orders need to be generated  Also to let Doctors know that no one has spoken to the wife

## 2023-12-08 ENCOUNTER — NURSE ONLY (OUTPATIENT)
Dept: CARDIOLOGY | Age: 73
End: 2023-12-08

## 2023-12-08 VITALS
OXYGEN SATURATION: 98 % | BODY MASS INDEX: 30.39 KG/M2 | WEIGHT: 236.77 LBS | DIASTOLIC BLOOD PRESSURE: 85 MMHG | HEART RATE: 90 BPM | SYSTOLIC BLOOD PRESSURE: 121 MMHG | HEIGHT: 74 IN | RESPIRATION RATE: 18 BRPM | TEMPERATURE: 98.1 F

## 2023-12-08 PROBLEM — G89.18 POST-OP PAIN: Status: ACTIVE | Noted: 2023-12-08

## 2023-12-08 LAB
ALBUMIN SERPL-MCNC: 3.6 G/DL (ref 3.4–5)
ANION GAP SERPL CALCULATED.3IONS-SCNC: 11 MMOL/L (ref 3–16)
BUN SERPL-MCNC: 7 MG/DL (ref 7–20)
CALCIUM SERPL-MCNC: 8.9 MG/DL (ref 8.3–10.6)
CHLORIDE SERPL-SCNC: 99 MMOL/L (ref 99–110)
CO2 SERPL-SCNC: 24 MMOL/L (ref 21–32)
CREAT SERPL-MCNC: 0.8 MG/DL (ref 0.8–1.3)
GFR SERPLBLD CREATININE-BSD FMLA CKD-EPI: >60 ML/MIN/{1.73_M2}
GLUCOSE SERPL-MCNC: 105 MG/DL (ref 70–99)
INR PPP: 1.2 (ref 0.84–1.16)
PHOSPHATE SERPL-MCNC: 3.7 MG/DL (ref 2.5–4.9)
POTASSIUM SERPL-SCNC: 4.3 MMOL/L (ref 3.5–5.1)
PROTHROMBIN TIME: 15.2 SEC (ref 11.5–14.8)
SODIUM SERPL-SCNC: 134 MMOL/L (ref 136–145)

## 2023-12-08 PROCEDURE — 99232 SBSQ HOSP IP/OBS MODERATE 35: CPT | Performed by: INTERNAL MEDICINE

## 2023-12-08 PROCEDURE — 99024 POSTOP FOLLOW-UP VISIT: CPT | Performed by: SURGERY

## 2023-12-08 PROCEDURE — 6360000002 HC RX W HCPCS: Performed by: HOSPITALIST

## 2023-12-08 PROCEDURE — 6370000000 HC RX 637 (ALT 250 FOR IP): Performed by: STUDENT IN AN ORGANIZED HEALTH CARE EDUCATION/TRAINING PROGRAM

## 2023-12-08 PROCEDURE — 85610 PROTHROMBIN TIME: CPT

## 2023-12-08 PROCEDURE — 6360000004 HC RX CONTRAST MEDICATION: Performed by: HOSPITALIST

## 2023-12-08 PROCEDURE — 6370000000 HC RX 637 (ALT 250 FOR IP): Performed by: ANESTHESIOLOGY

## 2023-12-08 PROCEDURE — C8929 TTE W OR WO FOL WCON,DOPPLER: HCPCS

## 2023-12-08 PROCEDURE — 2580000003 HC RX 258: Performed by: HOSPITALIST

## 2023-12-08 PROCEDURE — 6370000000 HC RX 637 (ALT 250 FOR IP): Performed by: HOSPITALIST

## 2023-12-08 PROCEDURE — 80069 RENAL FUNCTION PANEL: CPT

## 2023-12-08 PROCEDURE — 6370000000 HC RX 637 (ALT 250 FOR IP): Performed by: NURSE PRACTITIONER

## 2023-12-08 RX ORDER — OXYCODONE HYDROCHLORIDE 5 MG/1
5 TABLET ORAL EVERY 6 HOURS PRN
Qty: 18 TABLET | Refills: 0 | Status: SHIPPED | OUTPATIENT
Start: 2023-12-08 | End: 2023-12-23

## 2023-12-08 RX ORDER — OXYCODONE HYDROCHLORIDE 5 MG/1
5 TABLET ORAL EVERY 6 HOURS PRN
Qty: 5 TABLET | Refills: 0 | Status: SHIPPED | OUTPATIENT
Start: 2023-12-08 | End: 2023-12-08 | Stop reason: SDUPTHER

## 2023-12-08 RX ORDER — ATENOLOL 50 MG/1
50 TABLET ORAL DAILY
Status: DISCONTINUED | OUTPATIENT
Start: 2023-12-08 | End: 2023-12-08 | Stop reason: HOSPADM

## 2023-12-08 RX ORDER — AMIODARONE HYDROCHLORIDE 200 MG/1
200 TABLET ORAL DAILY
Qty: 30 TABLET | Refills: 0 | Status: SHIPPED | OUTPATIENT
Start: 2023-12-08

## 2023-12-08 RX ORDER — METHOCARBAMOL 500 MG/1
1000 TABLET, FILM COATED ORAL 4 TIMES DAILY
Status: DISCONTINUED | OUTPATIENT
Start: 2023-12-08 | End: 2023-12-08 | Stop reason: HOSPADM

## 2023-12-08 RX ORDER — SIMETHICONE 80 MG
80 TABLET,CHEWABLE ORAL ONCE
Status: COMPLETED | OUTPATIENT
Start: 2023-12-08 | End: 2023-12-08

## 2023-12-08 RX ORDER — AMIODARONE HYDROCHLORIDE 200 MG/1
200 TABLET ORAL 2 TIMES DAILY
Status: DISCONTINUED | OUTPATIENT
Start: 2023-12-08 | End: 2023-12-08 | Stop reason: HOSPADM

## 2023-12-08 RX ADMIN — METHOCARBAMOL 1500 MG: 750 TABLET ORAL at 09:00

## 2023-12-08 RX ADMIN — AMIODARONE HYDROCHLORIDE 200 MG: 200 TABLET ORAL at 13:28

## 2023-12-08 RX ADMIN — OXYCODONE 10 MG: 5 TABLET ORAL at 13:28

## 2023-12-08 RX ADMIN — Medication 100 MG: at 08:58

## 2023-12-08 RX ADMIN — OXYCODONE 10 MG: 5 TABLET ORAL at 08:58

## 2023-12-08 RX ADMIN — ATENOLOL 50 MG: 50 TABLET ORAL at 08:58

## 2023-12-08 RX ADMIN — AMIODARONE HYDROCHLORIDE 0.5 MG/MIN: 50 INJECTION, SOLUTION INTRAVENOUS at 03:29

## 2023-12-08 RX ADMIN — SIMETHICONE 80 MG: 80 TABLET, CHEWABLE ORAL at 04:27

## 2023-12-08 RX ADMIN — OXYCODONE 10 MG: 5 TABLET ORAL at 00:02

## 2023-12-08 RX ADMIN — ALLOPURINOL 300 MG: 300 TABLET ORAL at 08:58

## 2023-12-08 RX ADMIN — ATORVASTATIN CALCIUM 40 MG: 40 TABLET, FILM COATED ORAL at 08:58

## 2023-12-08 RX ADMIN — METHOCARBAMOL 1000 MG: 500 TABLET ORAL at 13:29

## 2023-12-08 RX ADMIN — OXYCODONE 10 MG: 5 TABLET ORAL at 04:43

## 2023-12-08 RX ADMIN — PERFLUTREN 1.5 ML: 6.52 INJECTION, SUSPENSION INTRAVENOUS at 08:30

## 2023-12-08 ASSESSMENT — PAIN SCALES - GENERAL
PAINLEVEL_OUTOF10: 8
PAINLEVEL_OUTOF10: 3
PAINLEVEL_OUTOF10: 4
PAINLEVEL_OUTOF10: 0
PAINLEVEL_OUTOF10: 0
PAINLEVEL_OUTOF10: 5
PAINLEVEL_OUTOF10: 7
PAINLEVEL_OUTOF10: 0
PAINLEVEL_OUTOF10: 7

## 2023-12-08 ASSESSMENT — PAIN DESCRIPTION - DESCRIPTORS
DESCRIPTORS: ACHING;SPASM
DESCRIPTORS: ACHING
DESCRIPTORS: ACHING;THROBBING;SHARP

## 2023-12-08 ASSESSMENT — PAIN DESCRIPTION - LOCATION
LOCATION: BACK
LOCATION: ABDOMEN
LOCATION: BACK;ABDOMEN
LOCATION: ABDOMEN

## 2023-12-08 ASSESSMENT — PAIN - FUNCTIONAL ASSESSMENT: PAIN_FUNCTIONAL_ASSESSMENT: PREVENTS OR INTERFERES SOME ACTIVE ACTIVITIES AND ADLS

## 2023-12-08 ASSESSMENT — PAIN DESCRIPTION - ONSET: ONSET: ON-GOING

## 2023-12-08 ASSESSMENT — PAIN DESCRIPTION - PAIN TYPE: TYPE: SURGICAL PAIN

## 2023-12-08 ASSESSMENT — PAIN DESCRIPTION - FREQUENCY: FREQUENCY: INTERMITTENT

## 2023-12-08 ASSESSMENT — PAIN DESCRIPTION - ORIENTATION: ORIENTATION: LEFT

## 2023-12-08 NOTE — PROGRESS NOTES
Physician Progress Note      PATIENT:               Chris Pope  CSN #:                  [id number]  :                       1950  ADMIT DATE:       2023 5:25 AM  DISCH DATE:  RESPONDING  PROVIDER #:        Hayley England MD          QUERY TEXT:    Pt admitted with afib and underwent PAC/PEG tube placement .? If possible,   please document in progress notes and discharge summary the relationship if   any between the afib and the surgery: The medical record reflects the following:  Risk Factors: 67 yo w/ tonsil cancer, S/P PEG tube placement, PAC placement  Clinical Indicators: recent diagnosis of tonsillar cancer who was in the   hospital for PEG tube placement. Post op he was found in new Afib with RVR. Treatment: Admission, IV Amiodarone, IV Metoprolol, Cardiology consult, ECHO   pending  Options provided:  -- AFIB is due to the procedure  -- AFB is not due to the procedure  -- Other - I will add my own diagnosis  -- Disagree - Not applicable / Not valid  -- Disagree - Clinically unable to determine / Unknown  -- Refer to Clinical Documentation Reviewer    PROVIDER RESPONSE TEXT:    Provider is clinically unable to determine a response to this query.     Query created by: Conchita Golden on 2023 9:49 AM      Electronically signed by:  Hayley England MD 2023 12:18 PM

## 2023-12-08 NOTE — PLAN OF CARE
Problem: Discharge Planning  Goal: Discharge to home or other facility with appropriate resources  Flowsheets  Taken 12/8/2023 0434 by Noah Ybarra RN  Discharge to home or other facility with appropriate resources: Identify barriers to discharge with patient and caregiver  Taken 12/7/2023 1902 by Josefa Bhatia RN  Discharge to home or other facility with appropriate resources: Identify barriers to discharge with patient and caregiver  Note: From Home with wife. No D/C needs identified at this time. Problem: Pain  Goal: Verbalizes/displays adequate comfort level or baseline comfort level  Flowsheets (Taken 12/8/2023 0434)  Verbalizes/displays adequate comfort level or baseline comfort level:   Assess pain using appropriate pain scale   Administer analgesics based on type and severity of pain and evaluate response  Note: C/O pain to left chest and abdomen at surgical sites. Abdominal binder in place and splint with pillow with coughing. Left chest site well approximated with surgical glue. Medicated with scheduled Robaxin and PRN Oxycodone. C/O intermittent sharp pains to abdomen, possibly gas. Simethicone ordered x1. Pain controlled over night. Problem: Safety - Adult  Goal: Free from fall injury  Outcome: Progressing  Note: Identified as a high fall risk. Fall precautions in place and bed alarm in use. Bed in locked and low position. Side rails up and seizure precautions in place. Problem: Cardiovascular - Adult  Goal: Maintains optimal cardiac output and hemodynamic stability  12/8/2023 0438 by Noah Ybarra RN  Outcome: Progressing  Flowsheets (Taken 12/8/2023 8983)  Maintains optimal cardiac output and hemodynamic stability:   Monitor blood pressure and heart rate   Assess for signs of decreased cardiac output  Note: Remains Aflutter/Afib on  Tele. HR 's. Remains on Amiodarone drip as ordered. IV site swelling noted on assessment.   IV intact with good blood return. IV removed and replaced. Dry heat to site as recommended.    12/8/2023 0437 by Amita Fry RN  Outcome: Progressing  Goal: Absence of cardiac dysrhythmias or at baseline  Outcome: Progressing     Problem: Cardiovascular - Adult  Goal: Absence of cardiac dysrhythmias or at baseline  Outcome: Progressing

## 2023-12-08 NOTE — PROGRESS NOTES
dx    Nutrition Monitoring and Evaluation:   Food/Nutrient Intake Outcomes:  Food and Nutrient Intake, Enteral Nutrition Intake/Tolerance  Physical Signs/Symptoms Outcomes:  Biochemical Data, GI Status     OBJECTIVE DATA: Significant to nutrition assessment  Nutrition Related Findings: +BM 12/7, Na 134, , thiamine ordered, CIWA protocol  Wounds: Surgical Incision  Nutrition Goals: Meet at least 75% of estimated needs, prior to discharge     CURRENT NUTRITION THERAPIES  ADULT DIET; Regular  PO Intake: 51-75% (x1 meal)     COMPARATIVE STANDARDS  Energy (kcal):  0310-1838 (18-22)     Protein (g):  129-154 (1.5-1.8)       Fluid (ml/day):  2000 ml    ANTHROPOMETRICS  Current Height: 188 cm (6' 2\")  Current Weight - Scale: 107.4 kg (236 lb 12.4 oz)    Admission weight: 110 kg (242 lb 6.4 oz)    The patient will be monitored per nutrition standards of care. Consult dietitian if additional nutrition interventions are needed prior to RD reassessment.      Nati Brothers, [address] [facility]:  [phone]  Office:  [phone]

## 2023-12-08 NOTE — DISCHARGE SUMMARY
V2.0  Discharge Summary    Name:  Altagracia Ceron /Age/Sex: 1950 (68 y.o. male)   Admit Date: 2023  Discharge Date: 23    MRN & CSN:  [id number] & [id number] Encounter Date and Time 23 1:07 PM EST    Attending:  No att. providers found Discharging Provider: Yasmin Reid MD       Hospital Course:     Brief HPI: Altagracia Ceron is a 68 y.o. male with pmh of gout, HTN, HLD, tobacco abusem obesity, alcoholic cirrhosis, BPH and a recent diagnosis of tonsillar cancer who was in the hospital for PEG tube placement. Post op he was found in new Afib with RVR. He was given a dose of metoprolol and amiodarone but did not convert so it was decided to admit him. He was started on amio drip. His HR improved but he did not convert into NSR. We switched him to oral amiodarine 200 mg BID x 1 week then once a day. He was discharged with a monitor for a week. 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      Consults this admission:  IP CONSULT TO CARDIOLOGY  IP CONSULT TO SOCIAL WORK    Discharge Diagnosis:   Atrial fibrillation with rapid ventricular response [facility])        Discharge Instruction:   Follow up appointments: PCP, EP  Primary care physician: Estevan Lima MD within 2 weeks  Diet: regular diet   Activity: activity as tolerated  Disposition: Discharged to:   [x]Home, [][facility], []SNF, []Acute Rehab, []Hospice   Condition on discharge: Stable  Labs and Tests to be Followed up as an outpatient by PCP or Specialist:     Discharge Medications:        Medication List        START taking these medications      amiodarone 200 MG tablet  Commonly known as: CORDARONE  Take 1 tablet by mouth daily Take 200 mg twice a day for one week then switch

## 2023-12-08 NOTE — PROGRESS NOTES
2 week CAM monitor # [id number] applied per order Dr Karthik No for Afib burden. Instructions given and questions answered. Bedside nurse aware.

## 2023-12-08 NOTE — PROGRESS NOTES
4 Eyes Skin Assessment     NAME:  Dawson Hernandez  YOB: 1950  MEDICAL RECORD NUMBER:  [id number]    The patient is being assessed for  Admission    I agree that at least one RN has performed a thorough Head to Toe Skin Assessment on the patient. ALL assessment sites listed below have been assessed. Areas assessed by both nurses:    Head, Face, Ears, Shoulders, Back, Chest, Arms, Elbows, Hands, Sacrum. Buttock, Coccyx, Ischium, Legs. Feet and Heels, and Under Medical Devices         Does the Patient have a Wound? Yes wound(s) were present on assessment. LDA wound assessment was Initiated and completed by RN    Incision left chest S/P Port Placement  PEG to abdomen  Bruise left knee  Scattered abrasions  Black dot left great toe.        Pa Prevention initiated by RN: No  Wound Care Orders initiated by RN: No    Pressure Injury (Stage 3,4, Unstageable, DTI, NWPT, and Complex wounds) if present, place Wound referral order by RN under : No    New Ostomies, if present place, Ostomy referral order under : No     Nurse 1 eSignature: Electronically signed by Miguel Bruce RN on 12/8/23 at 1:49 AM EST    **SHARE this note so that the co-signing nurse can place an eSignature**    Nurse 2 eSignature: Electronically signed by Darline Herrera RN on 12/8/23 at 5:07 AM EST

## 2023-12-08 NOTE — CARE COORDINATION
Patient states he is totally independent and drives and needs nothing at discharge. His wife will transport hime home. Case management will sign off.  Electronically signed by Farhana Washington RN on 12/8/2023 at 1:35 PM

## 2023-12-11 ENCOUNTER — CARE COORDINATION (OUTPATIENT)
Dept: CASE MANAGEMENT | Age: 73
End: 2023-12-11

## 2023-12-11 NOTE — PROGRESS NOTES
Physician Progress Note      PATIENT:               Judith Jacinto  CSN #:                  [id number]  :                       1950  ADMIT DATE:       2023 5:25 AM  DISCH DATE:        2023 5:15 PM  RESPONDING  PROVIDER #:        Pari Lucero MD          QUERY TEXT:    Pt admitted with afib. Pt noted to have low serum sodium. If possible, please   document in the progress notes and discharge summary if you are evaluating   and / or treating any of the following: The medical record reflects the following:  Risk Factors: 69 yo w/tonsil cancer, new PEG tube  Clinical Indicators:  sodium 129.   134. Treatment: Lab monitoring, normal saline gtt 75 ml/hr. Options provided:  -- Hyponatremia  -- Clinically insignificant low serum sodium  -- Other - I will add my own diagnosis  -- Disagree - Not applicable / Not valid  -- Disagree - Clinically unable to determine / Unknown  -- Refer to Clinical Documentation Reviewer    PROVIDER RESPONSE TEXT:    This patient has hyponatremia.     Query created by: Jayce Cortez on 2023 7:07 AM      Electronically signed by:  Pari Lucero MD 2023 11:32 AM

## 2023-12-11 NOTE — CARE COORDINATION
[address] [facility],[address] Transitions Initial Follow Up Call    Call within 2 business days of discharge: Yes    Patient:  Vitaly Saleem  Patient :  1950  MRN:  [id number]    Reason for Admission:  A-fib / peg tub & chest port placed   Discharge Date:  23   RARS: 10      Transitions of Care Initial Call    Was this an external facility discharge? no    Discharge Facility: [address] to be reviewed by the provider   Additional needs identified to be addressed with provider:    no    AMB CC Provider Discharge Needs: none            Non-face-to-face services provided:none      1ST  CTC attempt to reach Pt regarding recent hospital discharge. CTC left voice recording with call back number requesting a call back. HFU w/cardio - . Follow up appointments:    Future Appointments   Date Time Provider [address]   2023  1:30 PM Michael Rey, APRN - N[place] Ruiz.  MADELINE Werner, RN  Care Transition Coordinator  Contact Number:  [phone]

## 2023-12-12 ENCOUNTER — CARE COORDINATION (OUTPATIENT)
Dept: CASE MANAGEMENT | Age: 73
End: 2023-12-12

## 2023-12-12 NOTE — CARE COORDINATION
[address] [facility],[address] Transitions Initial Follow Up Call    Call within 2 business days of discharge: Yes    Patient:  Tenzin Colon  Patient :  1950  MRN:  [id number]    Reason for Admission:  A-Fib - tonsil cancer - port a cath placed   Discharge Date:  23   RARS: 10      Transitions of Care Initial Call    Was this an external facility discharge? no    Discharge Facility: [facility], [facility]      Challenges to be reviewed by the provider   Additional needs identified to be addressed with provider:    ues - hfu needed    AMB CC Provider Discharge Needs: none            Non-face-to-face services provided:  none    2ND CTC attempt to reach Pt regarding recent hospital discharge. CTC left voice recording with call back number requesting a call back. CT episode closed / unable to reach. HFU w/cardiology, NP Jesse Gurrola, . Follow up appointments:    Future Appointments   Date Time Provider [address]   2023  1:30 PM [facility]       JOSE LeeN, RN  Care Transition Coordinator  Contact Number:  [phone]

## 2023-12-13 ENCOUNTER — TELEPHONE (OUTPATIENT)
Dept: ONCOLOGY | Age: 73
End: 2023-12-13

## 2023-12-13 DIAGNOSIS — C09.9 MALIGNANT NEOPLASM OF TONSIL (HCC): Primary | ICD-10-CM

## 2023-12-13 NOTE — TELEPHONE ENCOUNTER
Referral received from Dr. Gudelia Wilkes for TF rec'd and MNT for [facility]. Recently dx tonsil ca (11/9/23); s/p Port/PEG placed 12/7/23 ; cisplatin started 12/12 q7d; XRT starting 12/13/23. Called pt to schedule nutrition appointment- patient requested to call this RD back to schedule. Contact info left with patient.       Electronically signed by Virgilio Senior RD, LD on 12/13/2023 at 9:28 AM

## 2023-12-14 ENCOUNTER — HOSPITAL ENCOUNTER (EMERGENCY)
Age: 73
Discharge: LEFT BEFORE DISCHARGE | End: 2023-12-14

## 2023-12-14 ENCOUNTER — HOSPITAL ENCOUNTER (EMERGENCY)
Age: 73
Discharge: HOME | End: 2023-12-14
Attending: EMERGENCY MEDICINE
Payer: MEDICARE

## 2023-12-14 VITALS
DIASTOLIC BLOOD PRESSURE: 75 MMHG | BODY MASS INDEX: 30.4 KG/M2 | RESPIRATION RATE: 18 BRPM | HEIGHT: 74 IN | SYSTOLIC BLOOD PRESSURE: 128 MMHG | HEART RATE: 56 BPM | TEMPERATURE: 98.2 F | OXYGEN SATURATION: 100 %

## 2023-12-14 DIAGNOSIS — K94.20 COMPLICATION OF GASTROSTOMY TUBE (HCC): Primary | ICD-10-CM

## 2023-12-14 PROCEDURE — 6370000000 HC RX 637 (ALT 250 FOR IP): Performed by: PHYSICIAN ASSISTANT

## 2023-12-14 PROCEDURE — 99283 EMERGENCY DEPT VISIT LOW MDM: CPT

## 2023-12-14 RX ORDER — OXYCODONE HYDROCHLORIDE 5 MG/1
5 TABLET ORAL ONCE
Status: COMPLETED | OUTPATIENT
Start: 2023-12-14 | End: 2023-12-14

## 2023-12-14 RX ORDER — ACETAMINOPHEN 325 MG/1
650 TABLET ORAL ONCE
Status: COMPLETED | OUTPATIENT
Start: 2023-12-14 | End: 2023-12-14

## 2023-12-14 RX ADMIN — OXYCODONE HYDROCHLORIDE 5 MG: 5 TABLET ORAL at 12:01

## 2023-12-14 RX ADMIN — ACETAMINOPHEN 650 MG: 325 TABLET ORAL at 12:01

## 2023-12-14 ASSESSMENT — PAIN SCALES - GENERAL: PAINLEVEL_OUTOF10: 0

## 2023-12-14 NOTE — CONSULTS
LEUKOCYTESUR Negative 10/07/2021 01:03 PM    UROBILINOGEN 0.2 10/07/2021 01:03 PM    BILIRUBINUR Negative 10/07/2021 01:03 PM    BILIRUBINUR neg 10/29/2019 03:03 PM    BLOODU Negative 10/07/2021 01:03 PM    GLUCOSEU Negative 10/07/2021 01:03 PM       Imaging:   No orders to display         Assessment/Plan: This is a 68 y.o. male with Hx of tonsillar SCC is POD 7 from a port and PEG tube placement (11/7) presents with a hole in his PEG tubing. The patient has not started using the PEG tube for feeding yet. Given the proximity of PEG tube placement, it is not currently safe to exchange the PEG tube and thus plan will be to trim the catheter proximal to the hole and re-attach the feeing hub (see image below), once the track for PEG tube has heal (approximately 4-6 weeks post insertion) the entire tube can be exchanged if the patient wishes to have a longer tube in place.         - Post-op follow up with Dr. Yumiko Bell as scheduled. - Patient and plan discussed with Dr. Yumiko Bell.      Thao Resendez, DO  12/14/23  2:22 PM

## 2023-12-14 NOTE — DISCHARGE INSTRUCTIONS
Follow-up with your surgeon as needed for further G-tube management. Return as needed for further concerns or worsening symptoms.

## 2023-12-26 ENCOUNTER — TELEPHONE (OUTPATIENT)
Dept: ENT CLINIC | Age: 73
End: 2023-12-26

## 2023-12-26 DIAGNOSIS — C09.9 CARCINOMA OF TONSIL (HCC): Primary | ICD-10-CM

## 2023-12-26 DIAGNOSIS — C77.0 MALIGNANT NEOPLASM METASTATIC TO LYMPH NODE OF NECK (HCC): ICD-10-CM

## 2023-12-26 RX ORDER — OXYCODONE HYDROCHLORIDE AND ACETAMINOPHEN 5; 325 MG/1; MG/1
1 TABLET ORAL EVERY 4 HOURS PRN
Qty: 25 TABLET | Refills: 0 | Status: SHIPPED | OUTPATIENT
Start: 2023-12-26 | End: 2023-12-31

## 2023-12-26 NOTE — TELEPHONE ENCOUNTER
Patient called says he is experiencing severe pain in his (R) jaw which he was told could possibly be a case of lock jaw or arthritis. He says it is also starting to affect his (R) ear now, the pain of this combined with the pain from his chemo/radiation tx on his throat is becoming unbearable. He would like to see if Dr. Lara Lima could suggest some options/ medicine or anything that could help to relieve some of the pain. Please call him, thanks!

## 2023-12-26 NOTE — TELEPHONE ENCOUNTER
Pt would like a medication change. Currently prescribed Gabapentin but says Oxycodine works better.

## 2023-12-28 ENCOUNTER — TELEPHONE (OUTPATIENT)
Dept: SURGERY | Age: 73
End: 2023-12-28

## 2023-12-28 DIAGNOSIS — I48.91 ATRIAL FIBRILLATION, UNSPECIFIED TYPE (HCC): Primary | ICD-10-CM

## 2023-12-28 NOTE — TELEPHONE ENCOUNTER
Dileep Simental from Dr. Cruz Norris office scheduled called wanting more information on the patient's feeding tube and if it safe to use still, after the doctor in the ER cut it    Please call [place] [phone] extension [phone]

## 2024-01-02 ENCOUNTER — HOSPITAL ENCOUNTER (OUTPATIENT)
Dept: ONCOLOGY | Age: 74
Setting detail: INFUSION SERIES
Discharge: HOME | End: 2024-01-02
Payer: MEDICARE

## 2024-01-02 ENCOUNTER — HOSPITAL ENCOUNTER (OUTPATIENT)
Age: 74
Discharge: HOME | End: 2024-01-02
Attending: INTERNAL MEDICINE | Admitting: INTERNAL MEDICINE
Payer: MEDICARE

## 2024-01-02 DIAGNOSIS — D64.9 ANEMIA, UNSPECIFIED TYPE: Primary | ICD-10-CM

## 2024-01-02 LAB
ABO + RH BLD: NORMAL
BLD GP AB SCN SERPL QL: NORMAL
BLOOD BANK DISPENSE STATUS: NORMAL
BLOOD BANK PRODUCT CODE: NORMAL
BPU ID: NORMAL
DESCRIPTION BLOOD BANK: NORMAL

## 2024-01-02 PROCEDURE — 36430 TRANSFUSION BLD/BLD COMPNT: CPT

## 2024-01-02 PROCEDURE — 86923 COMPATIBILITY TEST ELECTRIC: CPT

## 2024-01-02 PROCEDURE — 86900 BLOOD TYPING SEROLOGIC ABO: CPT

## 2024-01-02 PROCEDURE — 36591 DRAW BLOOD OFF VENOUS DEVICE: CPT

## 2024-01-02 PROCEDURE — P9016 RBC LEUKOCYTES REDUCED: HCPCS

## 2024-01-02 PROCEDURE — 86901 BLOOD TYPING SEROLOGIC RH(D): CPT

## 2024-01-02 PROCEDURE — G0379 DIRECT REFER HOSPITAL OBSERV: HCPCS

## 2024-01-02 PROCEDURE — G0378 HOSPITAL OBSERVATION PER HR: HCPCS

## 2024-01-02 PROCEDURE — 86850 RBC ANTIBODY SCREEN: CPT

## 2024-01-02 RX ORDER — SODIUM CHLORIDE 9 MG/ML
INJECTION, SOLUTION INTRAVENOUS CONTINUOUS
Status: CANCELLED | OUTPATIENT
Start: 2024-01-02

## 2024-01-02 RX ORDER — ALBUTEROL SULFATE 90 UG/1
4 AEROSOL, METERED RESPIRATORY (INHALATION) PRN
Status: CANCELLED | OUTPATIENT
Start: 2024-01-02

## 2024-01-02 RX ORDER — ONDANSETRON 2 MG/ML
8 INJECTION INTRAMUSCULAR; INTRAVENOUS
Status: CANCELLED | OUTPATIENT
Start: 2024-01-02

## 2024-01-02 RX ORDER — DIPHENHYDRAMINE HYDROCHLORIDE 50 MG/ML
50 INJECTION INTRAMUSCULAR; INTRAVENOUS
Status: CANCELLED | OUTPATIENT
Start: 2024-01-02

## 2024-01-02 RX ORDER — EPINEPHRINE 1 MG/ML
0.3 INJECTION, SOLUTION INTRAMUSCULAR; SUBCUTANEOUS PRN
Status: CANCELLED | OUTPATIENT
Start: 2024-01-02

## 2024-01-02 RX ORDER — ACETAMINOPHEN 325 MG/1
650 TABLET ORAL
Status: CANCELLED | OUTPATIENT
Start: 2024-01-02

## 2024-01-02 RX ORDER — SODIUM CHLORIDE 0.9 % (FLUSH) 0.9 %
5-40 SYRINGE (ML) INJECTION PRN
Status: DISCONTINUED | OUTPATIENT
Start: 2024-01-02 | End: 2024-01-03 | Stop reason: HOSPADM

## 2024-01-02 RX ORDER — SODIUM CHLORIDE 0.9 % (FLUSH) 0.9 %
5-40 SYRINGE (ML) INJECTION PRN
Status: CANCELLED | OUTPATIENT
Start: 2024-01-02

## 2024-01-02 NOTE — PROGRESS NOTES
Patient's hemoglobin this AM:  6.6  Patient's platelet count this AM: 119  Pt seen at [facility] today for  1 unit of PRBCs  for above lab values per order. Informed consent verified. No Pre-medications ordered with no previous transfusion reaction history. Transfused per policy. Pt tolerated transfusion well and without incident.  Pt verbalizes understanding of discharge instructions.  Discharged to his [facility] appointment.    Leslie Dykes RN

## 2024-01-02 NOTE — FLOWSHEET NOTE
01/02/24 1610   AVS Reviewed   AVS & discharge instructions reviewed with patient and/or representative? Yes   Reviewed instructions with Patient   Level of Understanding Questions answered;Teach back completed;Verbalized understanding     An After Visit Summary was printed and given to the patient.    Leslie Dykes RN

## 2024-01-02 NOTE — PROGRESS NOTES
Type and screen drawn to establish care for blood transfusion scheduled on 1/2/2024.    Leslie Dykes RN

## 2024-01-03 ENCOUNTER — TELEPHONE (OUTPATIENT)
Dept: CARDIOLOGY CLINIC | Age: 74
End: 2024-01-03

## 2024-01-03 VITALS
SYSTOLIC BLOOD PRESSURE: 101 MMHG | DIASTOLIC BLOOD PRESSURE: 62 MMHG | TEMPERATURE: 98.5 F | OXYGEN SATURATION: 97 % | RESPIRATION RATE: 16 BRPM | HEART RATE: 63 BPM

## 2024-01-04 ENCOUNTER — TELEPHONE (OUTPATIENT)
Dept: SURGERY | Age: 74
End: 2024-01-04

## 2024-01-04 ENCOUNTER — CLINICAL DOCUMENTATION (OUTPATIENT)
Dept: ONCOLOGY | Age: 74
End: 2024-01-04

## 2024-01-04 ENCOUNTER — TELEPHONE (OUTPATIENT)
Dept: CARDIOLOGY CLINIC | Age: 74
End: 2024-01-04

## 2024-01-04 VITALS — BODY MASS INDEX: 30.4 KG/M2 | HEIGHT: 74 IN

## 2024-01-04 NOTE — TELEPHONE ENCOUNTER
Charito- from [facility] called to fix some discrepancy on pt's referral and what he is doing at home.      1. Was told pt should take asa 81mg. Pt is taking 325. Confirm 81 is correct?    2. Pt stopped taking Lisinopril on his own. Bp 102/65    3. AMIODARONE clarify instructions and if he should be on it? Pt out. Send refill/     Requested Prescriptions     Pending Prescriptions Disp Refills    amiodarone (CORDARONE) 200 MG tablet 90 tablet 3     Sig: Take 1 tablet by mouth daily Take 200 mg twice a day for one week then switch to one tablet once a day          Number: 90    Refills: 3    Last Office Visit: Visit date not found     Next Office Visit: 1/15/2024     4. Lasix is 20 mg daily or prn?    5. Paperwork says Atorvastatin 80mg and pts bottle said 40mg.     Please call Charito at  [phone]

## 2024-01-04 NOTE — PROGRESS NOTES
Spoke with RD from [facility]. Pt does have orders for EN at TF- 2Cal HN/5 cartons per day in place. However, pt does not meet criteria for medicare TF coverage d/t PO diet/intake and missing SLP eval. Pt would have to pay OOP for cost of TF formula. Per [facility], this was explained to patient last week.     Pt also does not currently have homecare services currently for PEG; [facility] pending orders.

## 2024-01-04 NOTE — CONSULTS
Patient left message to cancel his RD appt bc he thought he'd double-booked himself for 10:45 for RD and [facility] [place].     Spoke w/pt that RD appt was today directly after his 9:15am XRT at 9:30 am and NP at [facility] has specifically scheduled him after RD. Strongly encouraged pt to reschedule for Mon/Tues next week at 9:30 a, again right after XRT. Pt in agreement.     DAVID also discussed Bayron Corona was to have pt visit today with him to show him how to flush his PEG w/water and provide supplies. Pt unaware of this- DAVID encouraged pt to call [facility] to confirm, and DAVID stressed importance of this teach as he needs to be able to flush water via his PEG to improve his renal panel. Pt expressed understanding.     DAVID also encouraged pt connect w/surgery, Dr. Weaver's office to schedule PEG revision. Pt stated he is working on this.     DAVID informed pt treatment team of visit cancellation.     Electronically signed by Faith Malone RD, LD on 1/5/2024 at 10:07 AM

## 2024-01-05 ENCOUNTER — HOSPITAL ENCOUNTER (OUTPATIENT)
Dept: ONCOLOGY | Age: 74
Discharge: HOME | End: 2024-01-05

## 2024-01-05 DIAGNOSIS — D64.9 ANEMIA, UNSPECIFIED TYPE: Primary | ICD-10-CM

## 2024-01-05 RX ORDER — AMIODARONE HYDROCHLORIDE 200 MG/1
200 TABLET ORAL DAILY
Qty: 30 TABLET | Refills: 0 | Status: ON HOLD | OUTPATIENT
Start: 2024-01-05 | End: 2024-01-26 | Stop reason: HOSPADM

## 2024-01-05 RX ORDER — SODIUM CHLORIDE 9 MG/ML
25 INJECTION, SOLUTION INTRAVENOUS PRN
OUTPATIENT
Start: 2024-01-05

## 2024-01-05 RX ORDER — DIPHENHYDRAMINE HYDROCHLORIDE 50 MG/ML
50 INJECTION INTRAMUSCULAR; INTRAVENOUS
OUTPATIENT
Start: 2024-01-05

## 2024-01-05 RX ORDER — ACETAMINOPHEN 325 MG/1
650 TABLET ORAL
OUTPATIENT
Start: 2024-01-05

## 2024-01-05 RX ORDER — EPINEPHRINE 1 MG/ML
0.3 INJECTION, SOLUTION INTRAMUSCULAR; SUBCUTANEOUS PRN
OUTPATIENT
Start: 2024-01-05

## 2024-01-05 RX ORDER — ONDANSETRON 2 MG/ML
8 INJECTION INTRAMUSCULAR; INTRAVENOUS
OUTPATIENT
Start: 2024-01-05

## 2024-01-05 RX ORDER — SODIUM CHLORIDE 9 MG/ML
20 INJECTION, SOLUTION INTRAVENOUS CONTINUOUS
OUTPATIENT
Start: 2024-01-05

## 2024-01-05 RX ORDER — SODIUM CHLORIDE 0.9 % (FLUSH) 0.9 %
5-40 SYRINGE (ML) INJECTION PRN
Status: CANCELLED | OUTPATIENT
Start: 2024-01-05

## 2024-01-05 RX ORDER — ALBUTEROL SULFATE 90 UG/1
4 AEROSOL, METERED RESPIRATORY (INHALATION) PRN
OUTPATIENT
Start: 2024-01-05

## 2024-01-05 RX ORDER — SODIUM CHLORIDE 9 MG/ML
INJECTION, SOLUTION INTRAVENOUS CONTINUOUS
OUTPATIENT
Start: 2024-01-05

## 2024-01-05 NOTE — TELEPHONE ENCOUNTER
Called and LVM asking Charito to call back.     Patient should be on aspirin 81 mg daily, amiodarone 200 mg daily, lipitor 40 mg daily, lasix 20 mg daily prn for leg swelling, can hold lisinopril if needed.     Patient missed follow up with Jovita Melendrez NP, please remind him of his upcoming appointment with Dr. Phelan. Will send refill for amiodarone.

## 2024-01-06 ENCOUNTER — HOSPITAL ENCOUNTER (OUTPATIENT)
Dept: ONCOLOGY | Age: 74
Setting detail: INFUSION SERIES
Discharge: HOME | End: 2024-01-06
Payer: MEDICARE

## 2024-01-06 VITALS
DIASTOLIC BLOOD PRESSURE: 70 MMHG | SYSTOLIC BLOOD PRESSURE: 116 MMHG | TEMPERATURE: 98.8 F | RESPIRATION RATE: 16 BRPM | OXYGEN SATURATION: 100 % | HEART RATE: 70 BPM

## 2024-01-06 DIAGNOSIS — D64.9 ANEMIA, UNSPECIFIED TYPE: Primary | ICD-10-CM

## 2024-01-06 LAB
ABO + RH BLD: NORMAL
BLD GP AB SCN SERPL QL: NORMAL
BLOOD BANK DISPENSE STATUS: NORMAL
BLOOD BANK DISPENSE STATUS: NORMAL
BLOOD BANK PRODUCT CODE: NORMAL
BLOOD BANK PRODUCT CODE: NORMAL
BPU ID: NORMAL
BPU ID: NORMAL
DESCRIPTION BLOOD BANK: NORMAL
DESCRIPTION BLOOD BANK: NORMAL

## 2024-01-06 PROCEDURE — 36430 TRANSFUSION BLD/BLD COMPNT: CPT

## 2024-01-06 PROCEDURE — 36591 DRAW BLOOD OFF VENOUS DEVICE: CPT

## 2024-01-06 PROCEDURE — 86923 COMPATIBILITY TEST ELECTRIC: CPT

## 2024-01-06 PROCEDURE — 86901 BLOOD TYPING SEROLOGIC RH(D): CPT

## 2024-01-06 PROCEDURE — 2580000003 HC RX 258: Performed by: INTERNAL MEDICINE

## 2024-01-06 PROCEDURE — 86850 RBC ANTIBODY SCREEN: CPT

## 2024-01-06 PROCEDURE — P9016 RBC LEUKOCYTES REDUCED: HCPCS

## 2024-01-06 PROCEDURE — 86900 BLOOD TYPING SEROLOGIC ABO: CPT

## 2024-01-06 RX ORDER — SODIUM CHLORIDE 0.9 % (FLUSH) 0.9 %
5-40 SYRINGE (ML) INJECTION PRN
Status: DISCONTINUED | OUTPATIENT
Start: 2024-01-06 | End: 2024-01-07 | Stop reason: HOSPADM

## 2024-01-06 RX ORDER — SODIUM CHLORIDE 9 MG/ML
INJECTION, SOLUTION INTRAVENOUS CONTINUOUS
OUTPATIENT
Start: 2024-01-06

## 2024-01-06 RX ORDER — ACETAMINOPHEN 325 MG/1
650 TABLET ORAL
OUTPATIENT
Start: 2024-01-06

## 2024-01-06 RX ORDER — SODIUM CHLORIDE 9 MG/ML
25 INJECTION, SOLUTION INTRAVENOUS PRN
OUTPATIENT
Start: 2024-01-06

## 2024-01-06 RX ORDER — ONDANSETRON 2 MG/ML
8 INJECTION INTRAMUSCULAR; INTRAVENOUS
OUTPATIENT
Start: 2024-01-06

## 2024-01-06 RX ORDER — EPINEPHRINE 1 MG/ML
0.3 INJECTION, SOLUTION INTRAMUSCULAR; SUBCUTANEOUS PRN
OUTPATIENT
Start: 2024-01-06

## 2024-01-06 RX ORDER — ALBUTEROL SULFATE 90 UG/1
4 AEROSOL, METERED RESPIRATORY (INHALATION) PRN
OUTPATIENT
Start: 2024-01-06

## 2024-01-06 RX ORDER — SODIUM CHLORIDE 0.9 % (FLUSH) 0.9 %
5-40 SYRINGE (ML) INJECTION PRN
Status: CANCELLED | OUTPATIENT
Start: 2024-01-06

## 2024-01-06 RX ORDER — SODIUM CHLORIDE 9 MG/ML
20 INJECTION, SOLUTION INTRAVENOUS CONTINUOUS
OUTPATIENT
Start: 2024-01-06

## 2024-01-06 RX ORDER — DIPHENHYDRAMINE HYDROCHLORIDE 50 MG/ML
50 INJECTION INTRAMUSCULAR; INTRAVENOUS
OUTPATIENT
Start: 2024-01-06

## 2024-01-06 RX ADMIN — SODIUM CHLORIDE, PRESERVATIVE FREE 10 ML: 5 INJECTION INTRAVENOUS at 08:40

## 2024-01-06 RX ADMIN — SODIUM CHLORIDE, PRESERVATIVE FREE 10 ML: 5 INJECTION INTRAVENOUS at 09:43

## 2024-01-06 RX ADMIN — SODIUM CHLORIDE, PRESERVATIVE FREE 10 ML: 5 INJECTION INTRAVENOUS at 13:22

## 2024-01-06 NOTE — PROGRESS NOTES
Patient's hemoglobin: 6.4   Patient's platelet count: 105  Pt seen at [facility] today for   2 units of PRBCs   for above lab values per order. Informed consent verified. No Pre-medications ordered with no previous transfusion reaction history. Transfused per policy. Pt tolerated transfusion well and without incident.  Pt verbalizes understanding of discharge instructions.  Discharged to his home.    Leslie Dykes RN

## 2024-01-06 NOTE — FLOWSHEET NOTE
01/06/24 0957   AVS Reviewed   AVS & discharge instructions reviewed with patient and/or representative? Yes   Reviewed instructions with Patient   Level of Understanding Questions answered;Teach back completed;Verbalized understanding     An After Visit Summary was printed and given to the patient.    Leslie Dykes RN

## 2024-01-08 ENCOUNTER — HOSPITAL ENCOUNTER (OUTPATIENT)
Dept: ONCOLOGY | Age: 74
Setting detail: INFUSION SERIES
Discharge: HOME | End: 2024-01-08

## 2024-01-08 VITALS — HEIGHT: 74 IN | BODY MASS INDEX: 30.4 KG/M2

## 2024-01-08 NOTE — CONSULTS
Oncology Nutrition Note    RECOMMENDATIONS:  Start TF via PEG: TwoCal HN (or equivalent); total of 5 cartons per day (1185 ml total volume daily).   Bolus 1 carton (237 ml) each feeding; total of 5 feedings daily.   Water flush 200 ml with each bolus feeding or 5 times daily.   This RD has instructed patient to call [employer] to request a TF shipment today.   PO Diet: Soft/Full liquids as tolerated.   Refer to OP SLP if swallow further deteriorates.   ONS: Ensure Complete (or equivalent) TID to meet 100% of needs not infused/provided from TF d/t medicare restrictions of TF only able to provide 2000 kcal/day max.   Total Daily Hydration: 64-90 oz fluid daily. This may be a combination of PO water/gaterade and above water flushes.   Nutrition Education: Verbal and written feeding and flushing schedule provided.     Pt to see RD weekly - next appointment 1/15/24    Nutritional summary: Nutritionally compromised w/weight loss and inconsitent PO intake r/t limited tolerance to solid foods/reliant on ONS for nutrition. Pt has PEG, but has not used this for nutrition since placed d/t initial complications w/leaking tube. This has been clipped and per NP, Hayley Willard at [facility], can be used to flush water and tube feeding. He is to have homecare instruction today on how to flush his feeding tube- d/t no previous established TF orders shipped, per [employer] they were not previously able to supply pt w/feeding supplies. This RD provided patient w/syringe supplies. Discussed at lenght nutrition goals. It is revealed pt drinking 28+ oz Gatorade, 16.5 oz water, and between 30-50 oz Ensure daily. He has not been able to keep up with drinking 5 Ensure Complete daily. RD discussed at this time, and recent 5 lb weight loss in last 7 days, that TF is recommended to start. He has unfortunately missed the last 3 weeks of chemo d/t his creatinine and no drinking enough. IVF has been ordered,however per NP, pt has been instructed to start

## 2024-01-15 ENCOUNTER — OFFICE VISIT (OUTPATIENT)
Dept: CARDIOLOGY CLINIC | Age: 74
End: 2024-01-15

## 2024-01-15 ENCOUNTER — HOSPITAL ENCOUNTER (OUTPATIENT)
Dept: ONCOLOGY | Age: 74
Discharge: HOME | End: 2024-01-15

## 2024-01-15 ENCOUNTER — TELEPHONE (OUTPATIENT)
Dept: ONCOLOGY | Age: 74
End: 2024-01-15

## 2024-01-15 VITALS
SYSTOLIC BLOOD PRESSURE: 110 MMHG | HEART RATE: 81 BPM | DIASTOLIC BLOOD PRESSURE: 76 MMHG | WEIGHT: 235.2 LBS | BODY MASS INDEX: 30.2 KG/M2

## 2024-01-15 DIAGNOSIS — I10 PRIMARY HYPERTENSION: ICD-10-CM

## 2024-01-15 DIAGNOSIS — Z01.811 PRE-OP CHEST EXAM: Primary | ICD-10-CM

## 2024-01-15 DIAGNOSIS — E78.2 MIXED HYPERLIPIDEMIA: ICD-10-CM

## 2024-01-15 DIAGNOSIS — I48.0 PAROXYSMAL ATRIAL FIBRILLATION (HCC): ICD-10-CM

## 2024-01-15 DIAGNOSIS — I48.91 ATRIAL FIBRILLATION WITH RAPID VENTRICULAR RESPONSE (HCC): ICD-10-CM

## 2024-01-15 DIAGNOSIS — D68.69 SECONDARY HYPERCOAGULABLE STATE (HCC): ICD-10-CM

## 2024-01-15 NOTE — CONSULTS
Oncology Nutrition Note    RECOMMENDATIONS:  Start TF via PEG: TwoCal HN (or equivalent); total of 5 cartons per day (1185 ml total volume daily).   Bolus 1 carton (237 ml) each feeding; total of 5 feedings daily.   Water flush 200 ml with each bolus feeding or 5 times daily.   This RD has instructed patient to call [employer] to request a TF shipment today.   PO Diet: Soft/Full liquids as tolerated.   Refer to OP SLP if swallow further deteriorates.   ONS: Ensure Complete (or equivalent) TID to meet 100% of needs not infused/provided from TF d/t medicare restrictions of TF only able to provide 2000 kcal/day max.   Total Daily Hydration: 64-90 oz fluid daily. This may be a combination of PO water/gaterade and above water flushes.   Nutrition Education: Verbal and written feeding and flushing schedule provided.     Pt to see RD weekly - next appointment 1/15/24    Nutritional summary:   Nutritionally now meets criteria for malnutrition w/an additional weight loss of 4 lb in 1 week; total -11 lb in under 1 mo. Using TF via PEG- NP Nimisha Chi has requested pt to get to his goal volume of 5 cartons per day. RD noted last XRT visit pt stated had decreased his water flushes d/t receiving IVF. Has been instructed to continue flushes as advised. Also noted continues to drink vodka tonics and has also been instructed to d/c.     Oncology/PMH: Tonsil Ca dx 11/9/2023; Cisplatin + XRT Q7D start 12/12/23 for planned 7 cycles. S/p Port & PEG placement per Dr. Weaver 12/8/23 (PMH includes cirrhosis, HLD/HTN)    MALNUTRITION ASSESSMENT         Findings of the 6 clinical characteristics of malnutrition (Minimum of 2 out of 6 clinical characteristics is required to make the diagnosis of moderate or severe Protein Calorie Malnutrition based on AND/ASPEN Guidelines):  Energy Intake: Less than/equal to 50% of estimated energy requirements    Energy Intake Time: Greater than or equal to 1 month    Weight Loss %: 2% loss or greater

## 2024-01-15 NOTE — PROGRESS NOTES
function.   he ascending aorta is borderline dilated measuring at 4.0cm.    Last CMR  (if available):    Last Coronary Artery Calcium Score:     Ankle-brachial index:    Carotid ultrasound screening:    Abdominal aortic aneurysm screening:       Assessment / Plan:     Paroxysmal atrial fibrillation (HCC)  Remains in NSR, monitor did confirm parox AF  Continue eliquis and amio until he recovered from radiation and completes surgical procedures to remove PEG tube    Hyperlipidemia  On lipitor. No side effects    Primary hypertension  Controlled  Continue atenolol  HR controlled as well    Ok to proceed with any upcoming surgical interventions.    Follow up in 6 months.    I had the opportunity to review the clinical symptoms and presentation of Arpit Vinson.     Patient's allergies and medications were reviewed and updated. Patient's past medical, surgical, social and family history were reviewed and updated.   Patient's testing including laboratory, ECGs, monitor, imaging (TTE,LIZ,CMR,cath) were reviewed.       Tobacco use was discussed with the patient and educated on the negative effects.I have asked the patient to not utilize these agents.    All questions and concerns were addressed to the patient/family. Alternatives to my treatment were discussed. The note was completed using EMR. Every effort wasmade to ensure accuracy; however, inadvertent computerized transcription errors may be present.    Thank you for allowing me to participate in thecare or Arpit Vinson    Castillo Phelan MD, FACC, [facility]  Advanced Cardiac Imaging  [facility]

## 2024-01-15 NOTE — TELEPHONE ENCOUNTER
Called pt d/t missing scheduled RD visit at 9:30am. Pt was confused b/c [facility] closed for holiday and he did not have xrt today.     Offered to reschedule pt today before his 2:30p [facility] appt or move him to Fri 9/19 after his xrt appt. Pt accepted reschedule for Friday @ 9:30am.     Pt instructed to call RD if needing to change his appointment.     Electronically signed by Faith Malone RD, LD on 1/15/2024 at 10:08 AM

## 2024-01-16 PROBLEM — D68.69 SECONDARY HYPERCOAGULABLE STATE (HCC): Status: ACTIVE | Noted: 2024-01-16

## 2024-01-16 ASSESSMENT — ENCOUNTER SYMPTOMS
ABDOMINAL DISTENTION: 0
FACIAL SWELLING: 0
SHORTNESS OF BREATH: 0
BLOOD IN STOOL: 0
COLOR CHANGE: 0
COUGH: 0
ABDOMINAL PAIN: 0
WHEEZING: 0
VOMITING: 0
BACK PAIN: 0
CHEST TIGHTNESS: 0
EYE DISCHARGE: 0

## 2024-01-16 NOTE — ASSESSMENT & PLAN NOTE
Remains in NSR, monitor did confirm parox AF  Continue eliquis and amio until he recovered from radiation and completes surgical procedures to remove PEG tube

## 2024-01-19 ENCOUNTER — HOSPITAL ENCOUNTER (OUTPATIENT)
Dept: ONCOLOGY | Age: 74
Setting detail: INFUSION SERIES
Discharge: HOME | End: 2024-01-19

## 2024-01-19 VITALS — BODY MASS INDEX: 30.2 KG/M2 | HEIGHT: 74 IN

## 2024-01-19 NOTE — CONSULTS
Oncology Nutrition Note    RECOMMENDATIONS:  TF via PEG: TwoCal HN (or equivalent); goal = 5 cartons per day (1185 ml total volume daily). Only infusing 3/day. Pt instructed to increase to 5 cartons via PEG. Schedule for water/formula bolus as follows:   8a - flush 200 ml water + 1 oz Liquacel (16g protein)  2p- flush 100 ml water before/after TF (3 cartons)  4p- flush 200 ml water   6:30p- flush 100 ml water before/after TF (2 cartons)  PO Diet: To be considered for pleasure only. Not tolerating any solids currently. Limited fluids via PO.   ONS: Ensure Complete (or equivalent)- BID as tolerated.   Nutrition Education: Verbal and written feeding and flushing schedule provided.     Pt to see RD weekly - next appointment 1/26/24    Nutritional summary: Weight down +5 lb Discussed current feeding schedule - he is doing 3 cartons at once; once daily; trying to drink 3 Ensure or similar, but struggling to keep up d/t timing and swallowing difficulties. Encouraged pt to add in 2nd feeding of 2-3 cartons in the PM, but not 3 hours prior to his bedtime. Discussed need to increase water flushes- has only been doing 200 ml with feed once daily. Missing the other 3 water flushes he was asked to include last RD visit. Discussed schedule to assist pt w/planning, as pt is stating appointment times/schedule as barrier. He was agreeable to doing 200 ml water flush every AM; with his lunchtime feeding and then to add in additional 2-3 carton bolus feed in PM with another 200 ml water. Offered Liquacel protein to use QD with AM flush to increase protein intake; pt accepted. RD will see in 1 week.     Oncology/PMH: Tonsil Ca dx 11/9/2023; Cisplatin + XRT Q7D start 12/12/23 for planned 7 cycles. S/p Port & PEG placement per Dr. Weaver 12/8/23 (PMH includes cirrhosis, HLD/HTN)    MALNUTRITION ASSESSMENT  Context of Malnutrition: Chronic Illness   Malnutrition Status: Moderate malnutrition  Findings of the 6 clinical characteristics of

## 2024-01-23 ENCOUNTER — APPOINTMENT (OUTPATIENT)
Dept: GENERAL RADIOLOGY | Age: 74
End: 2024-01-23
Payer: MEDICARE

## 2024-01-23 ENCOUNTER — HOSPITAL ENCOUNTER (INPATIENT)
Age: 74
LOS: 3 days | Discharge: HOME | End: 2024-01-26
Attending: STUDENT IN AN ORGANIZED HEALTH CARE EDUCATION/TRAINING PROGRAM | Admitting: FAMILY MEDICINE
Payer: MEDICARE

## 2024-01-23 DIAGNOSIS — D70.9 NEUTROPENIC FEVER (HCC): Primary | ICD-10-CM

## 2024-01-23 DIAGNOSIS — R50.81 NEUTROPENIC FEVER (HCC): Primary | ICD-10-CM

## 2024-01-23 DIAGNOSIS — R22.1 NECK MASS: ICD-10-CM

## 2024-01-23 DIAGNOSIS — R71.0 DROP IN HEMOGLOBIN: ICD-10-CM

## 2024-01-23 DIAGNOSIS — K12.33 MUCOSITIS DUE TO RADIATION THERAPY: ICD-10-CM

## 2024-01-23 PROBLEM — A41.9 NEUTROPENIC SEPSIS (HCC): Status: ACTIVE | Noted: 2024-01-23

## 2024-01-23 LAB
ABO + RH BLD: NORMAL
ALBUMIN SERPL-MCNC: 3.9 G/DL (ref 3.4–5)
ALP SERPL-CCNC: 105 U/L (ref 40–129)
ALT SERPL-CCNC: 12 U/L (ref 10–40)
ANION GAP SERPL CALCULATED.3IONS-SCNC: 13 MMOL/L (ref 3–16)
AST SERPL-CCNC: 22 U/L (ref 15–37)
BACTERIA URNS QL MICRO: ABNORMAL /HPF
BASOPHILS # BLD: 0 K/UL (ref 0–0.2)
BASOPHILS NFR BLD: 0.2 %
BILIRUB DIRECT SERPL-MCNC: 0.3 MG/DL (ref 0–0.3)
BILIRUB INDIRECT SERPL-MCNC: 0.3 MG/DL (ref 0–1)
BILIRUB SERPL-MCNC: 0.6 MG/DL (ref 0–1)
BILIRUB UR QL STRIP.AUTO: NEGATIVE
BLD GP AB SCN SERPL QL: NORMAL
BLOOD BANK DISPENSE STATUS: NORMAL
BLOOD BANK PRODUCT CODE: NORMAL
BPU ID: NORMAL
BUN SERPL-MCNC: 25 MG/DL (ref 7–20)
CALCIUM SERPL-MCNC: 9.1 MG/DL (ref 8.3–10.6)
CHLORIDE SERPL-SCNC: 92 MMOL/L (ref 99–110)
CLARITY UR: CLEAR
CO2 SERPL-SCNC: 24 MMOL/L (ref 21–32)
COLOR UR: YELLOW
CREAT SERPL-MCNC: 1.4 MG/DL (ref 0.8–1.3)
DEPRECATED RDW RBC AUTO: 19.7 % (ref 12.4–15.4)
DESCRIPTION BLOOD BANK: NORMAL
EOSINOPHIL # BLD: 0 K/UL (ref 0–0.6)
EOSINOPHIL NFR BLD: 0 %
EPI CELLS #/AREA URNS HPF: ABNORMAL /HPF (ref 0–5)
FLUAV RNA RESP QL NAA+PROBE: NOT DETECTED
FLUBV RNA RESP QL NAA+PROBE: NOT DETECTED
GFR SERPLBLD CREATININE-BSD FMLA CKD-EPI: 53 ML/MIN/{1.73_M2}
GLUCOSE SERPL-MCNC: 123 MG/DL (ref 70–99)
GLUCOSE UR STRIP.AUTO-MCNC: NEGATIVE MG/DL
HCT VFR BLD AUTO: 17.9 % (ref 40.5–52.5)
HGB BLD-MCNC: 6.3 G/DL (ref 13.5–17.5)
HGB UR QL STRIP.AUTO: ABNORMAL
KETONES UR STRIP.AUTO-MCNC: NEGATIVE MG/DL
LACTATE BLDV-SCNC: 1.8 MMOL/L (ref 0.4–1.9)
LACTATE BLDV-SCNC: 3.3 MMOL/L (ref 0.4–1.9)
LDH SERPL L TO P-CCNC: 218 U/L (ref 100–190)
LEUKOCYTE ESTERASE UR QL STRIP.AUTO: NEGATIVE
LYMPHOCYTES # BLD: 0.2 K/UL (ref 1–5.1)
LYMPHOCYTES NFR BLD: 11 %
MAGNESIUM SERPL-MCNC: 1.3 MG/DL (ref 1.8–2.4)
MCH RBC QN AUTO: 33.3 PG (ref 26–34)
MCHC RBC AUTO-ENTMCNC: 35.2 G/DL (ref 31–36)
MCV RBC AUTO: 94.7 FL (ref 80–100)
MONOCYTES # BLD: 0.2 K/UL (ref 0–1.3)
MONOCYTES NFR BLD: 13.2 %
NEUTROPHILS # BLD: 1.3 K/UL (ref 1.7–7.7)
NEUTROPHILS NFR BLD: 75.6 %
NITRITE UR QL STRIP.AUTO: NEGATIVE
PH UR STRIP.AUTO: 7 [PH] (ref 5–8)
PLATELET # BLD AUTO: 94 K/UL (ref 135–450)
PMV BLD AUTO: 7.7 FL (ref 5–10.5)
POTASSIUM SERPL-SCNC: 3.9 MMOL/L (ref 3.5–5.1)
PROCALCITONIN SERPL IA-MCNC: 0.1 NG/ML (ref 0–0.15)
PROT SERPL-MCNC: 7.5 G/DL (ref 6.4–8.2)
PROT UR STRIP.AUTO-MCNC: NEGATIVE MG/DL
RBC # BLD AUTO: 1.89 M/UL (ref 4.2–5.9)
RBC #/AREA URNS HPF: ABNORMAL /HPF (ref 0–4)
SARS-COV-2 RNA RESP QL NAA+PROBE: NOT DETECTED
SODIUM SERPL-SCNC: 129 MMOL/L (ref 136–145)
SP GR UR STRIP.AUTO: 1.01 (ref 1–1.03)
UA COMPLETE W REFLEX CULTURE PNL UR: ABNORMAL
UA DIPSTICK W REFLEX MICRO PNL UR: YES
URATE SERPL-MCNC: 5.3 MG/DL (ref 3.5–7.2)
URN SPEC COLLECT METH UR: ABNORMAL
UROBILINOGEN UR STRIP-ACNC: 0.2 E.U./DL
WBC # BLD AUTO: 1.8 K/UL (ref 4–11)
WBC #/AREA URNS HPF: ABNORMAL /HPF (ref 0–5)

## 2024-01-23 PROCEDURE — 2580000003 HC RX 258: Performed by: FAMILY MEDICINE

## 2024-01-23 PROCEDURE — 86850 RBC ANTIBODY SCREEN: CPT

## 2024-01-23 PROCEDURE — 99285 EMERGENCY DEPT VISIT HI MDM: CPT

## 2024-01-23 PROCEDURE — 84145 PROCALCITONIN (PCT): CPT

## 2024-01-23 PROCEDURE — 99223 1ST HOSP IP/OBS HIGH 75: CPT | Performed by: FAMILY MEDICINE

## 2024-01-23 PROCEDURE — 87103 BLOOD FUNGUS CULTURE: CPT

## 2024-01-23 PROCEDURE — 87253 VIRUS INOCULATE TISSUE ADDL: CPT

## 2024-01-23 PROCEDURE — 30233N1 TRANSFUSION OF NONAUTOLOGOUS RED BLOOD CELLS INTO PERIPHERAL VEIN, PERCUTANEOUS APPROACH: ICD-10-PCS | Performed by: FAMILY MEDICINE

## 2024-01-23 PROCEDURE — 87205 SMEAR GRAM STAIN: CPT

## 2024-01-23 PROCEDURE — 87636 SARSCOV2 & INF A&B AMP PRB: CPT

## 2024-01-23 PROCEDURE — 6370000000 HC RX 637 (ALT 250 FOR IP): Performed by: FAMILY MEDICINE

## 2024-01-23 PROCEDURE — 87070 CULTURE OTHR SPECIMN AEROBIC: CPT

## 2024-01-23 PROCEDURE — 36415 COLL VENOUS BLD VENIPUNCTURE: CPT

## 2024-01-23 PROCEDURE — 71046 X-RAY EXAM CHEST 2 VIEWS: CPT

## 2024-01-23 PROCEDURE — 2060000000 HC ICU INTERMEDIATE R&B

## 2024-01-23 PROCEDURE — 2580000003 HC RX 258: Performed by: STUDENT IN AN ORGANIZED HEALTH CARE EDUCATION/TRAINING PROGRAM

## 2024-01-23 PROCEDURE — 87040 BLOOD CULTURE FOR BACTERIA: CPT

## 2024-01-23 PROCEDURE — 86923 COMPATIBILITY TEST ELECTRIC: CPT

## 2024-01-23 PROCEDURE — 81001 URINALYSIS AUTO W/SCOPE: CPT

## 2024-01-23 PROCEDURE — 85025 COMPLETE CBC W/AUTO DIFF WBC: CPT

## 2024-01-23 PROCEDURE — P9016 RBC LEUKOCYTES REDUCED: HCPCS

## 2024-01-23 PROCEDURE — 87102 FUNGUS ISOLATION CULTURE: CPT

## 2024-01-23 PROCEDURE — 84550 ASSAY OF BLOOD/URIC ACID: CPT

## 2024-01-23 PROCEDURE — 6360000002 HC RX W HCPCS: Performed by: STUDENT IN AN ORGANIZED HEALTH CARE EDUCATION/TRAINING PROGRAM

## 2024-01-23 PROCEDURE — 6370000000 HC RX 637 (ALT 250 FOR IP): Performed by: NURSE PRACTITIONER

## 2024-01-23 PROCEDURE — 83615 LACTATE (LD) (LDH) ENZYME: CPT

## 2024-01-23 PROCEDURE — 87252 VIRUS INOCULATION TISSUE: CPT

## 2024-01-23 PROCEDURE — 80076 HEPATIC FUNCTION PANEL: CPT

## 2024-01-23 PROCEDURE — 83605 ASSAY OF LACTIC ACID: CPT

## 2024-01-23 PROCEDURE — 80048 BASIC METABOLIC PNL TOTAL CA: CPT

## 2024-01-23 PROCEDURE — 86901 BLOOD TYPING SEROLOGIC RH(D): CPT

## 2024-01-23 PROCEDURE — 87106 FUNGI IDENTIFICATION YEAST: CPT

## 2024-01-23 PROCEDURE — 86900 BLOOD TYPING SEROLOGIC ABO: CPT

## 2024-01-23 PROCEDURE — 83735 ASSAY OF MAGNESIUM: CPT

## 2024-01-23 PROCEDURE — 6360000002 HC RX W HCPCS: Performed by: FAMILY MEDICINE

## 2024-01-23 RX ORDER — POLYETHYLENE GLYCOL 3350 17 G/17G
17 POWDER, FOR SOLUTION ORAL DAILY PRN
Status: DISCONTINUED | OUTPATIENT
Start: 2024-01-23 | End: 2024-01-26 | Stop reason: HOSPADM

## 2024-01-23 RX ORDER — SODIUM CHLORIDE 0.9 % (FLUSH) 0.9 %
5-40 SYRINGE (ML) INJECTION EVERY 12 HOURS SCHEDULED
Status: DISCONTINUED | OUTPATIENT
Start: 2024-01-23 | End: 2024-01-26 | Stop reason: HOSPADM

## 2024-01-23 RX ORDER — SODIUM CHLORIDE 9 MG/ML
INJECTION, SOLUTION INTRAVENOUS PRN
Status: DISCONTINUED | OUTPATIENT
Start: 2024-01-23 | End: 2024-01-26 | Stop reason: HOSPADM

## 2024-01-23 RX ORDER — ACETAMINOPHEN 325 MG/1
650 TABLET ORAL EVERY 6 HOURS PRN
Status: DISCONTINUED | OUTPATIENT
Start: 2024-01-23 | End: 2024-01-25

## 2024-01-23 RX ORDER — POTASSIUM CHLORIDE 20 MEQ/1
40 TABLET, EXTENDED RELEASE ORAL PRN
Status: DISCONTINUED | OUTPATIENT
Start: 2024-01-23 | End: 2024-01-26 | Stop reason: HOSPADM

## 2024-01-23 RX ORDER — ACETAMINOPHEN 650 MG/1
650 SUPPOSITORY RECTAL EVERY 6 HOURS PRN
Status: DISCONTINUED | OUTPATIENT
Start: 2024-01-23 | End: 2024-01-25

## 2024-01-23 RX ORDER — MAGNESIUM SULFATE IN WATER 40 MG/ML
2000 INJECTION, SOLUTION INTRAVENOUS PRN
Status: DISCONTINUED | OUTPATIENT
Start: 2024-01-23 | End: 2024-01-26 | Stop reason: HOSPADM

## 2024-01-23 RX ORDER — MAGNESIUM HYDROXIDE/ALUMINUM HYDROXICE/SIMETHICONE 120; 1200; 1200 MG/30ML; MG/30ML; MG/30ML
30 SUSPENSION ORAL EVERY 6 HOURS PRN
Status: DISCONTINUED | OUTPATIENT
Start: 2024-01-23 | End: 2024-01-26 | Stop reason: HOSPADM

## 2024-01-23 RX ORDER — ATORVASTATIN CALCIUM 40 MG/1
40 TABLET, FILM COATED ORAL DAILY
Status: DISCONTINUED | OUTPATIENT
Start: 2024-01-24 | End: 2024-01-26 | Stop reason: HOSPADM

## 2024-01-23 RX ORDER — SODIUM CHLORIDE 9 MG/ML
INJECTION, SOLUTION INTRAVENOUS CONTINUOUS
Status: ACTIVE | OUTPATIENT
Start: 2024-01-23 | End: 2024-01-25

## 2024-01-23 RX ORDER — 0.9 % SODIUM CHLORIDE 0.9 %
30 INTRAVENOUS SOLUTION INTRAVENOUS ONCE
Status: COMPLETED | OUTPATIENT
Start: 2024-01-23 | End: 2024-01-23

## 2024-01-23 RX ORDER — MECOBALAMIN 5000 MCG
5 TABLET,DISINTEGRATING ORAL NIGHTLY
Status: DISCONTINUED | OUTPATIENT
Start: 2024-01-23 | End: 2024-01-26 | Stop reason: HOSPADM

## 2024-01-23 RX ORDER — POTASSIUM CHLORIDE 7.45 MG/ML
10 INJECTION INTRAVENOUS PRN
Status: DISCONTINUED | OUTPATIENT
Start: 2024-01-23 | End: 2024-01-26 | Stop reason: HOSPADM

## 2024-01-23 RX ORDER — SODIUM CHLORIDE 0.9 % (FLUSH) 0.9 %
5-40 SYRINGE (ML) INJECTION PRN
Status: DISCONTINUED | OUTPATIENT
Start: 2024-01-23 | End: 2024-01-26 | Stop reason: HOSPADM

## 2024-01-23 RX ADMIN — SODIUM CHLORIDE: 9 INJECTION, SOLUTION INTRAVENOUS at 21:23

## 2024-01-23 RX ADMIN — PHENOL 1 SPRAY: 1.5 LIQUID ORAL at 22:22

## 2024-01-23 RX ADMIN — CEFEPIME 2000 MG: 2 INJECTION, POWDER, FOR SOLUTION INTRAVENOUS at 22:45

## 2024-01-23 RX ADMIN — CEFEPIME 1000 MG: 1 INJECTION, POWDER, FOR SOLUTION INTRAMUSCULAR; INTRAVENOUS at 15:00

## 2024-01-23 RX ADMIN — NYSTATIN 5 ML: 100000 SUSPENSION ORAL at 20:54

## 2024-01-23 RX ADMIN — VANCOMYCIN HYDROCHLORIDE 2500 MG: 10 INJECTION, POWDER, LYOPHILIZED, FOR SOLUTION INTRAVENOUS at 15:25

## 2024-01-23 RX ADMIN — SODIUM CHLORIDE, PRESERVATIVE FREE 10 ML: 5 INJECTION INTRAVENOUS at 21:26

## 2024-01-23 RX ADMIN — SODIUM CHLORIDE 25 ML: 9 INJECTION, SOLUTION INTRAVENOUS at 22:45

## 2024-01-23 RX ADMIN — CEFEPIME 1000 MG: 1 INJECTION, POWDER, FOR SOLUTION INTRAMUSCULAR; INTRAVENOUS at 14:52

## 2024-01-23 RX ADMIN — ACETAMINOPHEN 650 MG: 325 TABLET ORAL at 22:29

## 2024-01-23 RX ADMIN — SODIUM CHLORIDE 3108 ML: 900 INJECTION, SOLUTION INTRAVENOUS at 17:31

## 2024-01-23 RX ADMIN — ACETAMINOPHEN 650 MG: 325 TABLET ORAL at 17:11

## 2024-01-23 RX ADMIN — Medication 5 MG: at 22:21

## 2024-01-23 ASSESSMENT — PAIN DESCRIPTION - ONSET
ONSET: ON-GOING

## 2024-01-23 ASSESSMENT — PAIN SCALES - GENERAL
PAINLEVEL_OUTOF10: 6
PAINLEVEL_OUTOF10: 0
PAINLEVEL_OUTOF10: 3
PAINLEVEL_OUTOF10: 5
PAINLEVEL_OUTOF10: 5
PAINLEVEL_OUTOF10: 3
PAINLEVEL_OUTOF10: 4
PAINLEVEL_OUTOF10: 4
PAINLEVEL_OUTOF10: 2
PAINLEVEL_OUTOF10: 4

## 2024-01-23 ASSESSMENT — PAIN DESCRIPTION - FREQUENCY
FREQUENCY: CONTINUOUS
FREQUENCY: CONTINUOUS
FREQUENCY: INTERMITTENT
FREQUENCY: CONTINUOUS

## 2024-01-23 ASSESSMENT — PAIN DESCRIPTION - LOCATION
LOCATION: MOUTH;THROAT
LOCATION: HEAD

## 2024-01-23 ASSESSMENT — PAIN DESCRIPTION - DESCRIPTORS
DESCRIPTORS: BURNING
DESCRIPTORS: ACHING
DESCRIPTORS: BURNING
DESCRIPTORS: BURNING

## 2024-01-23 ASSESSMENT — PAIN DESCRIPTION - ORIENTATION
ORIENTATION: INNER;UPPER
ORIENTATION: INNER
ORIENTATION: MID;INNER
ORIENTATION: MID

## 2024-01-23 ASSESSMENT — PAIN DESCRIPTION - PAIN TYPE
TYPE: ACUTE PAIN

## 2024-01-23 ASSESSMENT — PAIN - FUNCTIONAL ASSESSMENT
PAIN_FUNCTIONAL_ASSESSMENT: ACTIVITIES ARE NOT PREVENTED
PAIN_FUNCTIONAL_ASSESSMENT: ACTIVITIES ARE NOT PREVENTED
PAIN_FUNCTIONAL_ASSESSMENT: 0-10
PAIN_FUNCTIONAL_ASSESSMENT: ACTIVITIES ARE NOT PREVENTED
PAIN_FUNCTIONAL_ASSESSMENT: ACTIVITIES ARE NOT PREVENTED

## 2024-01-23 ASSESSMENT — LIFESTYLE VARIABLES
HOW MANY STANDARD DRINKS CONTAINING ALCOHOL DO YOU HAVE ON A TYPICAL DAY: 1 OR 2
HOW OFTEN DO YOU HAVE A DRINK CONTAINING ALCOHOL: 2-4 TIMES A MONTH

## 2024-01-23 NOTE — CONSULTS
Clinical Pharmacy Progress Note    Vancomycin - Management by Pharmacy    Consult Date(s): 1/23/24  Consulting Provider(s): Dr. Santana    Assessment / Plan  Febrile neutropenia - Vancomycin  Concurrent Antimicrobials:    Cefepime - day #1  Day of Vanc Therapy / Ordered Duration: #1  Current Dosing Method: Bayesian-Guided AUC Dosing  Therapeutic Goal: -600 mg/L*hr  Current Dose / Plan:   SCr 1.4 today - appears to be slightly up from baseline of 0.8-0.9 (Dec 2023)  Ordered vancomycin 2500mg IV x1 (25 mg/kg) loading dose earlier today (has not yet been administered).  Will follow with 750mg IV q12h  Regimen predicts AUC of 446 mg/L*h  Plan to check level in ~48h (or sooner if clinically indicated)  Will continue to monitor clinical condition and make adjustments to regimen as appropriate.    Please call with questions--  Maura Padgett, PharmD, BCPS  Wireless: [phone]   1/23/2024 3:06 PM        Subjective/Objective:   Arpit Vinson is a 73 y.o. male with a PMHx significant for HTN, tonsil cancer (on chemotherapy/radiation) who presented to ED from infusion center with anemia, dizzness; found have neutropenic fever.  No formal H&P documented at this time.    Pharmacy is consulted to dose Vancomycin.    Ht Readings from Last 1 Encounters:   01/19/24 1.88 m (6' 2\")     Wt Readings from Last 1 Encounters:   01/23/24 103.6 kg (228 lb 4.8 oz)     Current & Prior Antimicrobial Regimen(s):  Cefepime (1/23-current)  Vancomycin - Pharmacy to dose  2500mg IV x1 1/23  750mg IV q12h (1/24-current)    Vancomycin Level(s) / Doses:  Date Time Dose Type of Level / Level Interpretation                 Note: Serum levels collected for AUC-based dosing may be high if collected in close proximity to the dose administered. This is not necessarily indicative of toxicity.    Cultures & Sensitivities:    Date Site Micro Susceptibility / Result    Blood x 2      COVID-19 & Influenza       Recent Labs     01/23/24  1231    CREATININE 1.4*   BUN 25*   WBC 1.8*       Estimated Creatinine Clearance: 61 mL/min (A) (based on SCr of 1.4 mg/dL (H)).    Additional Lab Values / Findings of Note:    No results for input(s): \"PROCAL\" in the last 72 hours.

## 2024-01-23 NOTE — PROGRESS NOTES
Pt arrived to unit. VS obtained and pt placed on tele. Pt did not want to answer admission questions at this time due to sores in mouth from chemo. Magic Mouthwash requested from pharmacy.    No

## 2024-01-23 NOTE — CONSULTS
Clinical Pharmacy Progress Note    Vancomycin - Management by Pharmacy    Consult Date: 1/23/25  Consulting Provider: Dr. Williams Del Cid    A vancomycin loading dose of 2500mg x1 (~ 25mg/kg) has been ordered for the patient.     If vancomycin is desired to continue on admission, a new consult must be placed for pharmacy to continue dosing.     Please call with questions--  Ciro NarayananD, [facility]  Wireless: [phone]   1/23/2024 2:13 PM

## 2024-01-23 NOTE — ED NOTES
ED TO INPATIENT SBAR HANDOFF    Patient Name: Arpit Vinson   :  1950  73 y.o.   MRN:  [id number]  Preferred Name  Arpit  ED Room #:  B25/B25-25  Family/Caregiver Present no   Restraints no   Sitter no   Sepsis Risk Score Sepsis Risk Score: 4.7    Situation  Code Status: Full Code No additional code details.    Allergies: No known allergies  Weight: Patient Vitals for the past 96 hrs (Last 3 readings):   Weight   24 1207 103.6 kg (228 lb 4.8 oz)     Arrived from: home  Chief Complaint:   Chief Complaint   Patient presents with    Low Hgb    Dizziness     Pt presents from chemo infusion facility today for low Hgb and feeling dizzy. States has has Hx of tonsil cancer and has been getting chemo treatment for seven weeks.     Hospital Problem/Diagnosis:  Principal Problem:    Neutropenic sepsis (HCC)  Resolved Problems:    * No resolved hospital problems. *    Imaging:   XR CHEST (2 VW)   Final Result      No acute radiographic abnormality.                    Abnormal labs:   Abnormal Labs Reviewed   CBC WITH AUTO DIFFERENTIAL - Abnormal; Notable for the following components:       Result Value    WBC 1.8 (*)     RBC 1.89 (*)     Hemoglobin 6.3 (*)     Hematocrit 17.9 (*)     RDW 19.7 (*)     Platelets 94 (*)     Neutrophils Absolute 1.3 (*)     Lymphocytes Absolute 0.2 (*)     All other components within normal limits    Narrative:     CALL  [place]  [facility] tel. [phone],  Hematology results called to and read back by Marbin balderas, 2024 13:08,  by [facility]   BASIC METABOLIC PANEL W/ REFLEX TO MG FOR LOW K - Abnormal; Notable for the following components:    Sodium 129 (*)     Chloride 92 (*)     Glucose 123 (*)     BUN 25 (*)     Creatinine 1.4 (*)     Est, Glom Filt Rate 53 (*)     All other components within normal limits   LACTATE, SEPSIS - Abnormal; Notable for the following components:    Lactic Acid, Sepsis 3.3 (*)     All other components within normal limits   URINALYSIS WITH REFLEX TO

## 2024-01-23 NOTE — ED PROVIDER NOTES
[facility]  EMERGENCY DEPARTMENT ENCOUNTER          ATTENDING PHYSICIAN NOTE       Date of evaluation: 1/23/2024    Chief Complaint     Low Hgb and Dizziness (Pt presents from chemo infusion facility today for low Hgb and feeling dizzy. States has has Hx of tonsil cancer and has been getting chemo treatment for seven weeks.)      History of Present Illness     Arpit Vinson is a 73 y.o. male who presents with lightheadness    Patient reports that his last chemo was a week ago.  He was due for another dose today.  Review of the chart shows that this is a platinum based chemotherapy regimen with associated radiation therapy that he is also been receiving.  His hemoglobin had recently been in the 8 range at the end of December and prior to that had been above 12.  At outpatient labs obtained today was below 6 at 5.9.  He was sent here for that reason.  He reports some lightheadedness over the last day or 2.  He also reports some chronic congestion and cough, which she does not think is significantly worse today.  He denies any leg pain or leg swelling.  He denies any chest pain.  He denies any shortness of breath.  He denies sore throat.  He denies abdominal pain.  He notes that he does feed himself through the G-tube and that he does have some bloating after the feeding but that this has been chronic.    He denies any recent melena or bright red blood per rectum    He has not had fevers.    PMHx: As below  SH: As below      ASSESSMENT / PLAN  (MEDICAL DECISION MAKING)     INITIAL VITALS: BP: 119/66, Temp: 100.1 °F (37.8 °C), Pulse: 90, Respirations: 16, SpO2: 94 %      Arpit Vinson is a 73 y.o. male with lightheadedness and anemia found to have fever here.    Repeat hemoglobin here was 6.3.  Patient was consented by me for transfusion and 1 unit of packed red blood cells without a radiation was ordered after discussion with the covering physician Dr. Rooney from or oncology hematology care.

## 2024-01-24 ENCOUNTER — ANESTHESIA (OUTPATIENT)
Dept: ENDOSCOPY | Age: 74
End: 2024-01-24
Payer: MEDICARE

## 2024-01-24 ENCOUNTER — ANESTHESIA EVENT (OUTPATIENT)
Dept: ENDOSCOPY | Age: 74
End: 2024-01-24
Payer: MEDICARE

## 2024-01-24 DIAGNOSIS — I10 ESSENTIAL HYPERTENSION: ICD-10-CM

## 2024-01-24 DIAGNOSIS — E78.00 PURE HYPERCHOLESTEROLEMIA: ICD-10-CM

## 2024-01-24 PROBLEM — E43 SEVERE MALNUTRITION (HCC): Chronic | Status: ACTIVE | Noted: 2024-01-24

## 2024-01-24 LAB
ALBUMIN SERPL-MCNC: 3.1 G/DL (ref 3.4–5)
ALBUMIN/GLOB SERPL: 1.1 {RATIO} (ref 1.1–2.2)
ALP SERPL-CCNC: 77 U/L (ref 40–129)
ALT SERPL-CCNC: 10 U/L (ref 10–40)
ANION GAP SERPL CALCULATED.3IONS-SCNC: 11 MMOL/L (ref 3–16)
AST SERPL-CCNC: 16 U/L (ref 15–37)
BASOPHILS # BLD: 0 K/UL (ref 0–0.2)
BASOPHILS NFR BLD: 0.3 %
BILIRUB SERPL-MCNC: 0.6 MG/DL (ref 0–1)
BLOOD BANK DISPENSE STATUS: NORMAL
BLOOD BANK DISPENSE STATUS: NORMAL
BLOOD BANK PRODUCT CODE: NORMAL
BLOOD BANK PRODUCT CODE: NORMAL
BPU ID: NORMAL
BPU ID: NORMAL
BUN SERPL-MCNC: 21 MG/DL (ref 7–20)
CALCIUM SERPL-MCNC: 8.1 MG/DL (ref 8.3–10.6)
CHLORIDE SERPL-SCNC: 97 MMOL/L (ref 99–110)
CO2 SERPL-SCNC: 23 MMOL/L (ref 21–32)
CREAT SERPL-MCNC: 1.2 MG/DL (ref 0.8–1.3)
DEPRECATED RDW RBC AUTO: 20.1 % (ref 12.4–15.4)
DESCRIPTION BLOOD BANK: NORMAL
DESCRIPTION BLOOD BANK: NORMAL
EKG ATRIAL RATE: 66 BPM
EKG DIAGNOSIS: NORMAL
EKG P AXIS: 27 DEGREES
EKG P-R INTERVAL: 172 MS
EKG Q-T INTERVAL: 446 MS
EKG QRS DURATION: 94 MS
EKG QTC CALCULATION (BAZETT): 467 MS
EKG R AXIS: 31 DEGREES
EKG T AXIS: -6 DEGREES
EKG VENTRICULAR RATE: 66 BPM
EOSINOPHIL # BLD: 0 K/UL (ref 0–0.6)
EOSINOPHIL NFR BLD: 0 %
FERRITIN SERPL IA-MCNC: 281.9 NG/ML (ref 30–400)
GFR SERPLBLD CREATININE-BSD FMLA CKD-EPI: >60 ML/MIN/{1.73_M2}
GLUCOSE SERPL-MCNC: 121 MG/DL (ref 70–99)
HCT VFR BLD AUTO: 14.4 % (ref 40.5–52.5)
HCT VFR BLD AUTO: 19.8 % (ref 40.5–52.5)
HGB BLD-MCNC: 5.1 G/DL (ref 13.5–17.5)
HGB BLD-MCNC: 7 G/DL (ref 13.5–17.5)
IRON SATN MFR SERPL: 18 % (ref 20–50)
IRON SERPL-MCNC: 41 UG/DL (ref 59–158)
LYMPHOCYTES # BLD: 0.3 K/UL (ref 1–5.1)
LYMPHOCYTES NFR BLD: 15 %
MAGNESIUM SERPL-MCNC: 1.4 MG/DL (ref 1.8–2.4)
MCH RBC QN AUTO: 33.3 PG (ref 26–34)
MCHC RBC AUTO-ENTMCNC: 35.7 G/DL (ref 31–36)
MCV RBC AUTO: 93.4 FL (ref 80–100)
MONOCYTES # BLD: 0.2 K/UL (ref 0–1.3)
MONOCYTES NFR BLD: 12.9 %
NEUTROPHILS # BLD: 1.4 K/UL (ref 1.7–7.7)
NEUTROPHILS NFR BLD: 71.8 %
PLATELET # BLD AUTO: 72 K/UL (ref 135–450)
PMV BLD AUTO: 7.6 FL (ref 5–10.5)
POTASSIUM SERPL-SCNC: 3.3 MMOL/L (ref 3.5–5.1)
PROT SERPL-MCNC: 5.9 G/DL (ref 6.4–8.2)
RBC # BLD AUTO: 1.54 M/UL (ref 4.2–5.9)
SODIUM SERPL-SCNC: 131 MMOL/L (ref 136–145)
TIBC SERPL-MCNC: 222 UG/DL (ref 260–445)
WBC # BLD AUTO: 1.9 K/UL (ref 4–11)

## 2024-01-24 PROCEDURE — 83540 ASSAY OF IRON: CPT

## 2024-01-24 PROCEDURE — 85014 HEMATOCRIT: CPT

## 2024-01-24 PROCEDURE — 88342 IMHCHEM/IMCYTCHM 1ST ANTB: CPT

## 2024-01-24 PROCEDURE — 80053 COMPREHEN METABOLIC PANEL: CPT

## 2024-01-24 PROCEDURE — 1200000000 HC SEMI PRIVATE

## 2024-01-24 PROCEDURE — 6370000000 HC RX 637 (ALT 250 FOR IP): Performed by: FAMILY MEDICINE

## 2024-01-24 PROCEDURE — 2500000003 HC RX 250 WO HCPCS: Performed by: NURSE ANESTHETIST, CERTIFIED REGISTERED

## 2024-01-24 PROCEDURE — 82746 ASSAY OF FOLIC ACID SERUM: CPT

## 2024-01-24 PROCEDURE — C9113 INJ PANTOPRAZOLE SODIUM, VIA: HCPCS | Performed by: INTERNAL MEDICINE

## 2024-01-24 PROCEDURE — 2580000003 HC RX 258: Performed by: INTERNAL MEDICINE

## 2024-01-24 PROCEDURE — 7100000010 HC PHASE II RECOVERY - FIRST 15 MIN: Performed by: INTERNAL MEDICINE

## 2024-01-24 PROCEDURE — 6370000000 HC RX 637 (ALT 250 FOR IP): Performed by: NURSE PRACTITIONER

## 2024-01-24 PROCEDURE — 0DB68ZX EXCISION OF STOMACH, VIA NATURAL OR ARTIFICIAL OPENING ENDOSCOPIC, DIAGNOSTIC: ICD-10-PCS | Performed by: INTERNAL MEDICINE

## 2024-01-24 PROCEDURE — 82607 VITAMIN B-12: CPT

## 2024-01-24 PROCEDURE — 36430 TRANSFUSION BLD/BLD COMPNT: CPT

## 2024-01-24 PROCEDURE — 83010 ASSAY OF HAPTOGLOBIN QUANT: CPT

## 2024-01-24 PROCEDURE — 2709999900 HC NON-CHARGEABLE SUPPLY: Performed by: INTERNAL MEDICINE

## 2024-01-24 PROCEDURE — C9113 INJ PANTOPRAZOLE SODIUM, VIA: HCPCS | Performed by: FAMILY MEDICINE

## 2024-01-24 PROCEDURE — 88305 TISSUE EXAM BY PATHOLOGIST: CPT

## 2024-01-24 PROCEDURE — 93005 ELECTROCARDIOGRAM TRACING: CPT | Performed by: FAMILY MEDICINE

## 2024-01-24 PROCEDURE — 6360000002 HC RX W HCPCS: Performed by: INTERNAL MEDICINE

## 2024-01-24 PROCEDURE — 6360000002 HC RX W HCPCS: Performed by: NURSE PRACTITIONER

## 2024-01-24 PROCEDURE — A4216 STERILE WATER/SALINE, 10 ML: HCPCS | Performed by: FAMILY MEDICINE

## 2024-01-24 PROCEDURE — 36415 COLL VENOUS BLD VENIPUNCTURE: CPT

## 2024-01-24 PROCEDURE — A4216 STERILE WATER/SALINE, 10 ML: HCPCS | Performed by: INTERNAL MEDICINE

## 2024-01-24 PROCEDURE — 3700000000 HC ANESTHESIA ATTENDED CARE: Performed by: INTERNAL MEDICINE

## 2024-01-24 PROCEDURE — 3609012400 HC EGD TRANSORAL BIOPSY SINGLE/MULTIPLE: Performed by: INTERNAL MEDICINE

## 2024-01-24 PROCEDURE — 7100000011 HC PHASE II RECOVERY - ADDTL 15 MIN: Performed by: INTERNAL MEDICINE

## 2024-01-24 PROCEDURE — 83735 ASSAY OF MAGNESIUM: CPT

## 2024-01-24 PROCEDURE — 83550 IRON BINDING TEST: CPT

## 2024-01-24 PROCEDURE — 85025 COMPLETE CBC W/AUTO DIFF WBC: CPT

## 2024-01-24 PROCEDURE — 6360000002 HC RX W HCPCS: Performed by: FAMILY MEDICINE

## 2024-01-24 PROCEDURE — 82728 ASSAY OF FERRITIN: CPT

## 2024-01-24 PROCEDURE — 6360000002 HC RX W HCPCS: Performed by: NURSE ANESTHETIST, CERTIFIED REGISTERED

## 2024-01-24 PROCEDURE — 93010 ELECTROCARDIOGRAM REPORT: CPT | Performed by: INTERNAL MEDICINE

## 2024-01-24 PROCEDURE — 85018 HEMOGLOBIN: CPT

## 2024-01-24 PROCEDURE — 2580000003 HC RX 258: Performed by: FAMILY MEDICINE

## 2024-01-24 RX ORDER — OXYCODONE HYDROCHLORIDE 5 MG/1
5 TABLET ORAL EVERY 4 HOURS PRN
Status: DISCONTINUED | OUTPATIENT
Start: 2024-01-24 | End: 2024-01-25

## 2024-01-24 RX ORDER — LIDOCAINE HYDROCHLORIDE 20 MG/ML
INJECTION, SOLUTION INFILTRATION; PERINEURAL PRN
Status: DISCONTINUED | OUTPATIENT
Start: 2024-01-24 | End: 2024-01-24 | Stop reason: SDUPTHER

## 2024-01-24 RX ORDER — PROPOFOL 10 MG/ML
INJECTION, EMULSION INTRAVENOUS CONTINUOUS PRN
Status: DISCONTINUED | OUTPATIENT
Start: 2024-01-24 | End: 2024-01-24 | Stop reason: SDUPTHER

## 2024-01-24 RX ORDER — PROPOFOL 10 MG/ML
INJECTION, EMULSION INTRAVENOUS PRN
Status: DISCONTINUED | OUTPATIENT
Start: 2024-01-24 | End: 2024-01-24 | Stop reason: SDUPTHER

## 2024-01-24 RX ORDER — MAGNESIUM SULFATE IN WATER 40 MG/ML
4000 INJECTION, SOLUTION INTRAVENOUS ONCE
Status: COMPLETED | OUTPATIENT
Start: 2024-01-24 | End: 2024-01-24

## 2024-01-24 RX ORDER — ATORVASTATIN CALCIUM 40 MG/1
40 TABLET, FILM COATED ORAL DAILY
Qty: 90 TABLET | Refills: 1 | Status: SHIPPED | OUTPATIENT
Start: 2024-01-24

## 2024-01-24 RX ORDER — ATENOLOL 50 MG/1
50 TABLET ORAL DAILY
Qty: 90 TABLET | Refills: 1 | Status: SHIPPED | OUTPATIENT
Start: 2024-01-24 | End: 2024-01-26

## 2024-01-24 RX ORDER — LISINOPRIL 20 MG/1
20 TABLET ORAL DAILY
Qty: 90 TABLET | Refills: 1 | Status: SHIPPED | OUTPATIENT
Start: 2024-01-24 | End: 2024-01-26 | Stop reason: HOSPADM

## 2024-01-24 RX ORDER — SODIUM CHLORIDE 9 MG/ML
INJECTION, SOLUTION INTRAVENOUS PRN
Status: DISCONTINUED | OUTPATIENT
Start: 2024-01-24 | End: 2024-01-26 | Stop reason: HOSPADM

## 2024-01-24 RX ADMIN — OXYCODONE 5 MG: 5 TABLET ORAL at 18:33

## 2024-01-24 RX ADMIN — LIDOCAINE HYDROCHLORIDE 100 MG: 20 INJECTION, SOLUTION INFILTRATION; PERINEURAL at 13:57

## 2024-01-24 RX ADMIN — PROPOFOL 150 MCG/KG/MIN: 10 INJECTION, EMULSION INTRAVENOUS at 13:57

## 2024-01-24 RX ADMIN — MAGNESIUM SULFATE IN WATER FOR 4000 MG: 40 INJECTION INTRAVENOUS at 03:04

## 2024-01-24 RX ADMIN — PHENOL 1 SPRAY: 1.5 LIQUID ORAL at 04:48

## 2024-01-24 RX ADMIN — PANTOPRAZOLE SODIUM 40 MG: 40 INJECTION, POWDER, FOR SOLUTION INTRAVENOUS at 07:59

## 2024-01-24 RX ADMIN — VANCOMYCIN HYDROCHLORIDE 750 MG: 10 INJECTION, POWDER, LYOPHILIZED, FOR SOLUTION INTRAVENOUS at 15:29

## 2024-01-24 RX ADMIN — PHENOL 1 SPRAY: 1.5 LIQUID ORAL at 00:12

## 2024-01-24 RX ADMIN — SODIUM CHLORIDE 25 ML: 9 INJECTION, SOLUTION INTRAVENOUS at 02:58

## 2024-01-24 RX ADMIN — Medication 5 MG: at 22:31

## 2024-01-24 RX ADMIN — CEFEPIME 2000 MG: 2 INJECTION, POWDER, FOR SOLUTION INTRAVENOUS at 08:14

## 2024-01-24 RX ADMIN — OXYCODONE 5 MG: 5 TABLET ORAL at 01:04

## 2024-01-24 RX ADMIN — OXYCODONE 5 MG: 5 TABLET ORAL at 22:31

## 2024-01-24 RX ADMIN — PROPOFOL 40 MG: 10 INJECTION, EMULSION INTRAVENOUS at 13:57

## 2024-01-24 RX ADMIN — VANCOMYCIN HYDROCHLORIDE 750 MG: 10 INJECTION, POWDER, LYOPHILIZED, FOR SOLUTION INTRAVENOUS at 02:59

## 2024-01-24 RX ADMIN — OXYCODONE 5 MG: 5 TABLET ORAL at 10:26

## 2024-01-24 RX ADMIN — CEFEPIME 2000 MG: 2 INJECTION, POWDER, FOR SOLUTION INTRAVENOUS at 22:32

## 2024-01-24 RX ADMIN — SODIUM CHLORIDE: 9 INJECTION, SOLUTION INTRAVENOUS at 09:27

## 2024-01-24 RX ADMIN — ATORVASTATIN CALCIUM 40 MG: 40 TABLET, FILM COATED ORAL at 08:00

## 2024-01-24 RX ADMIN — OXYCODONE 5 MG: 5 TABLET ORAL at 04:47

## 2024-01-24 RX ADMIN — SODIUM CHLORIDE, PRESERVATIVE FREE 40 MG: 5 INJECTION INTRAVENOUS at 22:31

## 2024-01-24 RX ADMIN — POTASSIUM BICARBONATE 40 MEQ: 782 TABLET, EFFERVESCENT ORAL at 08:00

## 2024-01-24 RX ADMIN — CEFEPIME 2000 MG: 2 INJECTION, POWDER, FOR SOLUTION INTRAVENOUS at 15:28

## 2024-01-24 RX ADMIN — SODIUM CHLORIDE, PRESERVATIVE FREE 10 ML: 5 INJECTION INTRAVENOUS at 08:14

## 2024-01-24 ASSESSMENT — PAIN SCALES - GENERAL
PAINLEVEL_OUTOF10: 0
PAINLEVEL_OUTOF10: 4
PAINLEVEL_OUTOF10: 7
PAINLEVEL_OUTOF10: 3
PAINLEVEL_OUTOF10: 3
PAINLEVEL_OUTOF10: 7
PAINLEVEL_OUTOF10: 5
PAINLEVEL_OUTOF10: 4
PAINLEVEL_OUTOF10: 10
PAINLEVEL_OUTOF10: 0
PAINLEVEL_OUTOF10: 4
PAINLEVEL_OUTOF10: 6
PAINLEVEL_OUTOF10: 8

## 2024-01-24 ASSESSMENT — PAIN DESCRIPTION - LOCATION
LOCATION: MOUTH;THROAT
LOCATION: MOUTH;THROAT
LOCATION_2: KNEE
LOCATION: THROAT;MOUTH
LOCATION: THROAT;MOUTH
LOCATION: MOUTH;THROAT
LOCATION: MOUTH;THROAT

## 2024-01-24 ASSESSMENT — PAIN - FUNCTIONAL ASSESSMENT
PAIN_FUNCTIONAL_ASSESSMENT: ACTIVITIES ARE NOT PREVENTED
PAIN_FUNCTIONAL_ASSESSMENT: NONE - DENIES PAIN
PAIN_FUNCTIONAL_ASSESSMENT: 0-10
PAIN_FUNCTIONAL_ASSESSMENT: ACTIVITIES ARE NOT PREVENTED
PAIN_FUNCTIONAL_ASSESSMENT_SITE2: ACTIVITIES ARE NOT PREVENTED

## 2024-01-24 ASSESSMENT — PAIN DESCRIPTION - ORIENTATION
ORIENTATION: MID;INNER
ORIENTATION: MID;INNER
ORIENTATION_2: RIGHT
ORIENTATION: MID;INNER
ORIENTATION: INNER
ORIENTATION: INNER

## 2024-01-24 ASSESSMENT — PAIN DESCRIPTION - FREQUENCY
FREQUENCY: CONTINUOUS

## 2024-01-24 ASSESSMENT — ENCOUNTER SYMPTOMS
TROUBLE SWALLOWING: 1
SORE THROAT: 1

## 2024-01-24 ASSESSMENT — PAIN DESCRIPTION - DESCRIPTORS
DESCRIPTORS: SORE
DESCRIPTORS: BURNING
DESCRIPTORS: BURNING
DESCRIPTORS_2: ACHING
DESCRIPTORS: THROBBING
DESCRIPTORS: BURNING
DESCRIPTORS: BURNING

## 2024-01-24 ASSESSMENT — PAIN DESCRIPTION - ONSET
ONSET: ON-GOING
ONSET: PROGRESSIVE

## 2024-01-24 ASSESSMENT — PAIN DESCRIPTION - PAIN TYPE
TYPE: ACUTE PAIN

## 2024-01-24 ASSESSMENT — PAIN DESCRIPTION - INTENSITY: RATING_2: 3

## 2024-01-24 ASSESSMENT — LIFESTYLE VARIABLES: SMOKING_STATUS: 1

## 2024-01-24 NOTE — H&P
overnight: []Yes  []No    --------------------------------------------------------------------------------------------------------------------------------------------------------------------    Imaging:     XR CHEST (2 VW)    Result Date: 1/23/2024  EXAM: PA AND LATERAL CHEST X-RAY INDICATION: cough, COMPARISON: 12/7/2023 FINDINGS: Left subclavian PowerPort catheter tip in distal SVC. No focal consolidation. Evidence of remote granulomatous disease. No pleural effusion or pneumothorax. Cardiac silhouette is normal.     No acute radiographic abnormality.       PCP: Sissy Evangelista MD    Past Medical History:        Diagnosis Date    Arthritis     Hepatitis C     Treated    History of meniscal tear     Right knee     Hyperlipidemia     Hypertension     Impaired fasting glucose     Tinnitus     TMJ dysfunction     Tonsillar mass     right       Past Surgical History:        Procedure Laterality Date    CATARACT EXTRACTION Bilateral     COLONOSCOPY  Aug., 2014 ( 2017 )    Dr. Tatum - tubular adenomas ( 3 )    COLONOSCOPY  *Nov.26, 2019 ( 2024 )    Dr. Banuelos - tubular adenoma    GASTROSTOMY TUBE PLACEMENT N/A 12/7/2023    PERCUTANEOUS ENDOSCOPIC GASTROSTOMY TUBE INSERTION performed by Maldonado Weaver MD at [facility] OR    MOUTH SURGERY Right 11/09/2023    BIOPSY RIGHT TONSIL, DIRECT LARYNGOSCOPY performed by Leopoldo Matias MD at [facility] OR    PORT SURGERY N/A 12/7/2023    PORT-A-CATHETER PLACEMENT; performed by Maldonado Weaver MD at [facility] OR    TONSILLECTOMY         Medications Prior to Admission:   Prior to Admission medications    Medication Sig Start Date End Date Taking? Authorizing Provider   amiodarone (CORDARONE) 200 MG tablet Take 1 tablet by mouth daily 1/5/24   Castillo Phelan MD   apixaban (ELIQUIS) 5 MG TABS tablet Take 1 tablet by mouth 2 times daily  Patient not taking: Reported on 1/15/2024 12/8/23   Tez Santoyo MD   furosemide (LASIX) 20 MG tablet TAKE 1 TABLET BY MOUTH DAILY AS NEEDED FOR LEG  SWELLING 12/7/23   Sissy Evangelista MD   diclofenac sodium (VOLTAREN) 1 % GEL Apply topically 2 times daily    ProviderKraig MD   Ginkgo Biloba 40 MG TABS Take by mouth    Kraig Bernard MD   IRON GLYCINATE PO Take 28 mg by mouth    ProviderKraig MD   atorvastatin (LIPITOR) 40 MG tablet TAKE 1 TABLET BY MOUTH DAILY 10/25/23   Sissy Evangelista MD   Misc Natural Products (TUMERSAID PO) Take by mouth    ProviderKraig MD   Milk Thistle 500 MG CAPS Take by mouth    ProviderKraig MD   ibuprofen (ADVIL;MOTRIN) 200 MG tablet Take 1 tablet by mouth every 6 hours as needed for Pain    Kraig Bernard MD   spironolactone (ALDACTONE) 25 MG tablet Take 1 tablet by mouth daily 7/25/23   Sissy Evangelista MD   allopurinol (ZYLOPRIM) 300 MG tablet Take 1 tablet by mouth daily 7/25/23   Sissy Evangelista MD   lisinopril (PRINIVIL;ZESTRIL) 20 MG tablet Take 1 tablet by mouth daily  Patient not taking: Reported on 1/23/2024 7/25/23   Sissy Evangelista MD   atenolol (TENORMIN) 50 MG tablet Take 1 tablet by mouth daily 7/25/23   Sissy Evangelista MD   aspirin (ASPIRIN CHILDRENS) 81 MG chewable tablet Take 1 tablet by mouth daily  Patient not taking: Reported on 1/15/2024 10/28/21   Blayne Yeung MD       Labs: Personally reviewed and interpreted for clinical significance.   Recent Labs     01/23/24  1231   WBC 1.8*   HGB 6.3*   HCT 17.9*   PLT 94*     Recent Labs     01/23/24  1231   *   K 3.9   CL 92*   CO2 24   BUN 25*   CREATININE 1.4*   CALCIUM 9.1   MG 1.30*     No results for input(s): \"PROBNP\", \"TROPHS\" in the last 72 hours.  No results for input(s): \"LABA1C\" in the last 72 hours.  Recent Labs     01/23/24  1231   AST 22   ALT 12   BILIDIR 0.3   BILITOT 0.6   ALKPHOS 105     No results for input(s): \"INR\", \"LACTA\", \"TSH\" in the last 72 hours.     Orville Santana MD

## 2024-01-24 NOTE — PROGRESS NOTES
Clinical Pharmacy Progress Note    Vancomycin - Management by Pharmacy    Consult Date(s): 1/23/24  Consulting Provider(s): Dr. Santana    Assessment / Plan  Febrile neutropenia - Vancomycin  Concurrent Antimicrobials:    Cefepime - day #2  Day of Vanc Therapy / Ordered Duration: #2  Current Dosing Method: Bayesian-Guided AUC Dosing  Therapeutic Goal: -600 mg/L*hr  Current Dose / Plan:   SCr improved slightly overnight (1.4?1.2).   Appears to be slightly up from baseline of 0.8-0.9 (Dec 2023)  On 750mg IV q12h  Regimen predicts AUC of 443 mg/L*h  Vancomycin level ordered for tomorrow AM - Thurs 1/25 to confirm kinetic estimates.  Will continue to monitor clinical condition and make adjustments to regimen as appropriate.    Please call with questions--  Maura Padgett, PharmD, [facility]  Wireless: [phone]   1/24/2024 8:18 AM      Interval update: Hgb down to 5.1 this AM. SCr improved slightly from 1.4?1.2. Awaiting GI consult.    Subjective/Objective:   Arpit Vinson is a 73 y.o. male with a PMHx significant for HTN, tonsil cancer (on chemotherapy/radiation) who presented to ED from infusion center with anemia, dizzness; found have neutropenic fever.  Admitted with sepsis, neutropenic fever, ROSANNA and concern for GI bleed.    Pharmacy is consulted to dose Vancomycin.    Ht Readings from Last 1 Encounters:   01/23/24 1.88 m (6' 2\")     Wt Readings from Last 1 Encounters:   01/23/24 103.4 kg (227 lb 15.3 oz)     Current & Prior Antimicrobial Regimen(s):  Cefepime (1/23-current)  Vancomycin - Pharmacy to dose  2500mg IV x1 1/23  750mg IV q12h (1/24-current)    Vancomycin Level(s) / Doses:  Date Time Dose Type of Level / Level Interpretation                 Note: Serum levels collected for AUC-based dosing may be high if collected in close proximity to the dose administered. This is not necessarily indicative of toxicity.    Cultures & Sensitivities:    Date Site Micro Susceptibility / Result    Blood x 2     1/23  COVID-19 & Influenza Not detected     Urine      Sputum       Recent Labs     01/23/24  1231 01/24/24  0310   CREATININE 1.4* 1.2   BUN 25* 21*   WBC 1.8* 1.9*         Estimated Creatinine Clearance: 71 mL/min (based on SCr of 1.2 mg/dL).    Additional Lab Values / Findings of Note:    Recent Labs     01/23/24  1231   PROCAL 0.10

## 2024-01-24 NOTE — PROCEDURES
EGD REPORT    Patient:  Arpit Vinson                  1950    Referring Physician:  Angie    Endoscopist: South     Indication:  Anemia     Medications:  MAC      Pre-Anesthesia Assessment:  I have reviewed and am in agreement with patient history and medication, including previous response to sedation.    Prior to the procedure, a History and Physical was performed, and patient medications and allergies were reviewed. The patient is competent. The risks and benefits of the procedure and the sedation options and risks were discussed with the patient.   Risks discussed included but were not limited to infection, bleeding, perforation, death, and missed lesions.  All questions were answered and informed consent was obtained. Patient identification and proposed procedure were verified by the physician and the nurse in the pre-procedure area in the procedure room.     Mallampatti: II  ASA Grade Assessment: 4    After reviewing the risks and benefits, the patient was deemed in satisfactory condition to undergo the procedure. The anesthesia plan was to use MAC anesthesia. Immediately prior to administration of medications, the patient was re-assessed for adequacy to receive sedatives and a time out was performed. Patient and healthcare providers were in agreement it was the correct patient and procedure. The heart rate, respiratory rate, oxygen saturations, blood pressure, adequacy of pulmonary ventilation, and response to care were monitored throughout the procedure. The physical status of the patient was re-assessed after the procedure.     After obtaining informed consent, the endoscope was passed under direct vision.   The endoscope was introduced through the mouth, and advanced to the second part of duodenum. The EGD was accomplished without difficulty. The patient tolerated the procedure well.       Duodenum: Normal second portion; large scar from previous ulcer in the bulb with small residual ulcer  present.   Stomach: healing clean based ulcers in the antrum with scarring and biopsies obtained. 5mm clean based ulcer in the body and edges biopsied. PEG present in body and pushed in and there is clean based ulceration around the orifice.  Retroflexion did not show a hiatal hernia.  Esophagus: GE junction at 40cms.  No evidence of Santiago's or esophagitis.  There is ulceration and friability throughout the pharynx  All biopsy areas watched to ensure no bleeding    Estimated blood loss none    Plan:  The patient knows it is their responsibility to call for biopsy results in 7 days  BID PPI x 8 weeks  Ok to use eliquis if needed  Call back with questions or concerns

## 2024-01-24 NOTE — CONSULTS
Oncology Hematology Care    Consult Note      Requesting Physician:  Orville Santana     CHIEF COMPLAINT:  anemia       HISTORY OF PRESENT ILLNESS:    Mr. Vinson  is a 73 y.o. male we are seeing in consultation for Tonsil cancer.     He is followed by Dr. Ashby as an outpatient. Recently diagnosed with Tonsil cancer and has been treated with concurrent chemoradiation with Cisplatin. He was only able to complete 4 of his chemo treatments due to issues with toxicities. Additionally he was refusing to use his G tube for hydration and nutrition. Additionally not following instructions in a variety of way that impeded his care.     He presented to the office yesterday for follow up and treatment. He was doing very poorly and was weak and fatigued. He was found to have Hgb 5.9 and was sent to the ED for evaluation. While in the ED, he spiked a fever. He met SIRS criteria and was admitted for further management.     Today he is seen sitting in his bedside chair. He is fully dressed. He is asking when he can go home, wanting to know what his plan is. Wife is concerned that he hasn't had anything to eat since yesterday. He denies any dark stools, states that was \"weeks ago\" and had since resolved.         Past Medical History:  Past Medical History:   Diagnosis Date    Arthritis     Hepatitis C     Treated    History of meniscal tear     Right knee     Hyperlipidemia     Hypertension     Impaired fasting glucose     Tinnitus     TMJ dysfunction     Tonsillar mass     right       Past Surgical History:  Past Surgical History:   Procedure Laterality Date    CATARACT EXTRACTION Bilateral     COLONOSCOPY  Aug., 2014 ( 2017 )    Dr. Tatum - tubular adenomas ( 3 )    COLONOSCOPY  *Nov.26, 2019 ( 2024 )    Dr. Banuelos - tubular adenoma    GASTROSTOMY TUBE PLACEMENT N/A 12/7/2023    PERCUTANEOUS ENDOSCOPIC GASTROSTOMY

## 2024-01-24 NOTE — PROGRESS NOTES
Pt c/o throat and mouth pain 5/10 after giving the magic mouthwash. Tylenol is not due to be given. Notify NP. New order place.

## 2024-01-24 NOTE — ANESTHESIA POSTPROCEDURE EVALUATION
Department of Anesthesiology  Postprocedure Note    Patient: Arpit Vinson  MRN: [id number]  YOB: 1950  Date of evaluation: 1/24/2024    Procedure Summary       Date: 01/24/24 Room / Location: [place] [address] / [facility]    Anesthesia Start: 1353 Anesthesia Stop: 1408    Procedure: EGD BIOPSY Diagnosis:       Drop in hemoglobin      (Drop in hemoglobin [R71.0])    Surgeons: Ward Gale MD Responsible Provider: Regis Perez MD    Anesthesia Type: MAC ASA Status: 4            Anesthesia Type: No value filed.    Jose Phase I: Jose Score: 10    Jose Phase II:      Anesthesia Post Evaluation    Patient location during evaluation: PACU  Patient participation: complete - patient participated  Level of consciousness: awake  Pain score: 0  Airway patency: patent  Nausea & Vomiting: no nausea  Cardiovascular status: hemodynamically stable  Respiratory status: acceptable  Hydration status: stable  Pain management: satisfactory to patient    No notable events documented.

## 2024-01-24 NOTE — PROGRESS NOTES
Pt seen and examined  Wife at bedside  States he is ok wants to eat  Denies nausea or vomiting  No melena  No hematochezia  No chest pain or shortness of breath  States eating has become slightly more difficult  Vitals:    01/24/24 1310   BP: 122/69   Pulse: 69   Resp: 16   Temp: 98.2 °F (36.8 °C)   SpO2: 99%      Gen: pleasant alert oriented  Neck: no jvd  Chest clear bilaterally  Cvs: s2s1 no murmurs  Abd: soft nd nt bs normal active  Ext: no edema    Neuro: grossly normal no signs of asterixis    Labs    K is 3.3 sodium 131 bun 21 creatinine 1.2 mag is 1.4    H/h 7 and 19.8 after blood    74 yo male admitted with 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  On chemoradiation with cisplatin; last treatment 1/16; admitted for pancytopenia, concern for acute blood loss anemia and gi bleed and several electrolyte derangements.  Clinically he is doing well, discussed case at length with patient and wife at bedside.  Discussed dietary compliance will have nutrition eval while here.  Discussed case with heme onc await cultures, resume diet when cleared by gi.  Pt has a hixtory of liver disease from etoh consumption he states he does not drink more than a small amount daily.  Continue with current care and monitoring, d/w nurse and social work

## 2024-01-24 NOTE — CONSULTS
Consult Note      Arpit Vinson  1950    Consultant: Dr. Ward Gale  Reason for Consult:  \"GI bleed\"  Requesting Physician: Dr. Kaylin Adams    CHIEF COMPLAINT: \"GI bleed\"    History Obtained From:  patient, electronic medical record    HISTORY OF PRESENT ILLNESS:    The patient is a 73 y.o. male with significant past medical history of HTN, HLD, tonsillar neoplasm, and A-Fib on Eliquis who presents with neutropenic sepsis and anemia. Pt states he has been feeling more fatigued recently w/ generalized weakness, coughing up blood, and black stools. He also reports fevers. He went to his oncologist who found his HGB to be 5.9 so he was sent to the ER. In the ED, his HGB was 6.3 and HCT was 17.9. He had a colonoscopy w/ hot snare polypectomy in 11/26/2019 which showed 5 polyps ranging in size from 3-5 mm in the cecum that were removed, 6 mm sessile polyp in the proximal transverse colon that was removed, and 5 mm sessile polyp in the mid sigmoid colon that was removed.    Past Medical History:        Diagnosis Date    Arthritis     Hepatitis C     Treated    History of meniscal tear     Right knee     Hyperlipidemia     Hypertension     Impaired fasting glucose     Tinnitus     TMJ dysfunction     Tonsillar mass     right     Past Surgical History:        Procedure Laterality Date    CATARACT EXTRACTION Bilateral     COLONOSCOPY  Aug., 2014 ( 2017 )    Dr. Tatum - tubular adenomas ( 3 )    COLONOSCOPY  *Nov.26, 2019 ( 2024 )    Dr. Banuelos - tubular adenoma    GASTROSTOMY TUBE PLACEMENT N/A 12/7/2023    PERCUTANEOUS ENDOSCOPIC GASTROSTOMY TUBE INSERTION performed by Maldonado Weaver MD at [facility] OR    MOUTH SURGERY Right 11/09/2023    BIOPSY RIGHT TONSIL, DIRECT LARYNGOSCOPY performed by Leopoldo Matias MD at [facility] OR    PORT SURGERY N/A 12/7/2023    PORT-A-CATHETER PLACEMENT; performed by Maldonado Weaver MD at [facility] OR    TONSILLECTOMY       Medications at Home:  Medications Prior to Admission:  the ED, his HGB was 6.3 and HCT was 17.9  - HGB 5.1 this AM  - Hold all AC if possible  - Continue Protonix 40 mg IV BID  - Plan for colonoscopy w/ possible EGD (to investigate for esophageal varices)     Thank you for allowing me to participate in Arpit Vinson's care.     Eric Adams MD  PGY-1, Internal Medicine    Ward Gale MD

## 2024-01-24 NOTE — PROGRESS NOTES
Occupational Therapy/Physical Therapy  Hold    PT/OT orders received and chart reviewed. Will hold evaluations this AM 2/2 critical value of Hgb 5.1 and Hct 14.4. Will evaluate once labs are stable with Hg >7.     Katiana Hernandez, OTR/L [id number]    Chai Torrez, PT, DPT

## 2024-01-24 NOTE — PLAN OF CARE
Problem: Discharge Planning  Goal: Discharge to home or other facility with appropriate resources  Outcome: Progressing  Flowsheets (Taken 1/23/2024 1955)  Discharge to home or other facility with appropriate resources:   Identify barriers to discharge with patient and caregiver   Arrange for needed discharge resources and transportation as appropriate   Identify discharge learning needs (meds, wound care, etc)     Problem: Pain  Goal: Verbalizes/displays adequate comfort level or baseline comfort level  Outcome: Progressing  Flowsheets (Taken 1/23/2024 1844 by Stephanie Fraser, RN)  Verbalizes/displays adequate comfort level or baseline comfort level: Encourage patient to monitor pain and request assistance     Problem: ABCDS Injury Assessment  Goal: Absence of physical injury  Outcome: Progressing

## 2024-01-24 NOTE — PROGRESS NOTES
Pt is gagging and coughing and vomited some saliva, phlegm and the crashed meds given around 2200. Pt states feels like something stuck in his throat and that is very painful and makes him gagging. Assess pt gag, it is present, pharynx appear red and swollen, Notify NP. NPO order place.

## 2024-01-24 NOTE — PROGRESS NOTES
Comprehensive Nutrition Assessment    RECOMMENDATIONS:  PO Diet: NPO; ADAT  Nutrition Education: No recommendation at this time   Nutrition Support: Cyclic TF                   (7 pm-7am)        Day 1: Initiate  Two Rosendo  @ 35 mL/hr As tolerated, increase by 25  mL/hr q 6 hours until goal of 70 mL/hr  Day 2: Two rosendo HN @ 70 ml/hr, increase  25  ml q 6 hours  until goal of 95 ml is reached.   Recommend water bolus 165 ml every 4 hours     NUTRITION ASSESSMENT:   Nutritional summary & status: Consult for General Mgmt/Other Reasons. Pt diagnosed with Squamous Cell Cancer of the right tonsil in Nov 2023. Pt reports inability to eat any solid foods due to swallowing difficulty since chemordx treatment. Reported recent difficulty even with drinking Ensure Supplements. Significant wt loss of 6.5% in 1 month per EMR. Severe malnutrition identified per ASPEN criteria. Pt  with PEG tube, however, and not been following TF recommendations for home TF per Outpt Oncology RD.  Currently using Two Rosendo HN with recom for 5 cartons per day(1185 ml TV daily) bolus via PEG. Pt reports difficulty with feeling of fullness with bolus feedings and unable to administer recom amount. Agreeable to Cyclic TF during hospital admit to promote adequate nutrition/hydration. NPO at present. Will provide recommendations for cyclic nocturnal feeds and monitor nutritional status.   Admission // PMH: Neutropenic sepsis//arthritis, HTN, impaired fasting glu, tonsillar mass    MALNUTRITION ASSESSMENT  Context of Malnutrition: Chronic Illness   Malnutrition Status: Severe malnutrition  Findings of the 6 clinical characteristics of malnutrition (Minimum of 2 out of 6 clinical characteristics is required to make the diagnosis of moderate or severe Protein Calorie Malnutrition based on AND/ASPEN Guidelines):  Energy Intake:  75% or less estimated energy requirements for 1 month or longer  Weight Loss:  Greater than 5% over 1 month     Body Fat Loss:  No  significant body fat loss     Muscle Mass Loss:  No significant muscle mass loss    Fluid Accumulation:  No significant fluid accumulation     Strength:  Not Performed    NUTRITION DIAGNOSIS   Severe malnutrition related to acute injury/trauma, swallowing difficulty (chemordx tmt) as evidenced by Criteria as identified in malnutrition assessment    Nutrition Monitoring and Evaluation:   Food/Nutrient Intake Outcomes:  Enteral Nutrition Intake/Tolerance  Physical Signs/Symptoms Outcomes:  Biochemical Data, Chewing or Swallowing, Nutrition Focused Physical Findings, Weight     OBJECTIVE DATA: Significant to nutrition assessment  Nutrition Related Findings: No edema. LBM1/23. K+3.3(replenished) Na 131, Mg 1.4 Glu 121.  Wounds: None  Nutrition Goals: Initiate nutrition support     CURRENT NUTRITION THERAPIES  Diet NPO Exceptions are: Ice Chips  Current Tube Feeding (TF) Orders:  Feeding Route: PEG  Formula: 2.0 Calorie  Schedule: Cyclic  Additives/Modulars: None  Water Flushes: 165 ml q 4 hrs  Current TF & Flush Orders Provides:    Goal TF & Flush Orders Provides: Two Orsendo HN @ 95 ml/hr x 12 hrs to provide 1140 ml TV, 2280 kcal, 95 gm pro, 809 ml TV  PO Intake: NPO   PO Supplement Intake:NPO  Additional Sources of Calories/IVF:NS @ 75 ml/hr     COMPARATIVE STANDARDS  Energy (kcal):  1961-9726     Protein (g):  112-129 (1.2-1.5 gm/IBW)       Fluid (ml/day):  1 ml/kcal or per MD    ANTHROPOMETRICS  Current Height: 188 cm (6' 2\")  Current Weight - Scale:  (pt refused at this time)    Admission weight: 103.6 kg (228 lb 4.8 oz)    The patient will be monitored per nutrition standards of care. Consult dietitian if additional nutrition interventions are needed prior to RD reassessment.     Flash Morgan RD  [place]:  [phone]  Office:  [phone]

## 2024-01-24 NOTE — PROGRESS NOTES
4 Eyes Skin Assessment     NAME:  Arpit Vinson  YOB: 1950  MEDICAL RECORD NUMBER:  [id number]    The patient is being assessed for  Admission    I agree that at least one RN has performed a thorough Head to Toe Skin Assessment on the patient. ALL assessment sites listed below have been assessed.      Areas assessed by both nurses:    Head, Face, Ears, Shoulders, Back, Chest, Arms, Elbows, Hands, Sacrum. Buttock, Coccyx, Ischium, Legs. Feet and Heels, and Under Medical Devices         Does the Patient have a Wound? Yes wound(s) were present on assessment. LDA wound assessment was Initiated and completed by RN       Pa Prevention initiated by RN: No  Wound Care Orders initiated by RN: No    Pressure Injury (Stage 3,4, Unstageable, DTI, NWPT, and Complex wounds) if present, place Wound referral order by RN under : No    New Ostomies, if present place, Ostomy referral order under : No     Wound noted on abdomen. Pt states where PEG tubing lays at times and he did not put guaze to protect the area. Also noted some old scarring on buttocks.     Nurse 1 eSignature: Electronically signed by Stephanie Fraser RN on 1/23/24 at 7:38 PM EST    **SHARE this note so that the co-signing nurse can place an eSignature**    Nurse 2 eSignature: Electronically signed by Magnolia Evangelista RN on 1/24/24 at 12:21 AM EST

## 2024-01-24 NOTE — PROGRESS NOTES
Pt refused for getting standing weigh, pt is up on chair so unable to get weight using bed scale. Pt states he wants to sleep as much as he can while no coughing pit/almost vomiting episode.

## 2024-01-24 NOTE — CARE COORDINATION
Case Management Assessment  Initial Evaluation    Date/Time of Evaluation: 1/24/2024 3:48 PM  Assessment Completed by: Sandra Calero RN    If patient is discharged prior to next notation, then this note serves as note for discharge by case management.    Patient Name: Arpit Vinson                   YOB: 1950  Diagnosis: Neutropenic fever (HCC) [D70.9, R50.81]  Neutropenic sepsis (HCC) [A41.9, D70.9]                   Date / Time: 1/23/2024 11:58 AM    Patient Admission Status: Inpatient   Readmission Risk (Low < 19, Mod (19-27), High > 27): Readmission Risk Score: 21.4    Current PCP: Sissy Evangelista MD  PCP verified by CM? Yes    Chart Reviewed: Yes      History Provided by: Patient, Spouse, Medical Record  Patient Orientation: Alert and Oriented, Person, Place, Situation    Patient Cognition: Alert    Hospitalization in the last 30 days (Readmission):  No    If yes, Readmission Assessment in CM Navigator will be completed.    Advance Directives:      Code Status: Full Code   Patient's Primary Decision Maker is: Legal Next of Kin    Primary Decision Maker: Yuki Vinson - Spouse - [phone]    Discharge Planning:    Patient lives with: Spouse/Significant Other Type of Home: House  Primary Care Giver: Self  Patient Support Systems include: Spouse/Significant Other   Current Financial resources:    Current community resources:    Current services prior to admission: None            Current DME:              Type of Home Care services:  None    ADLS  Prior functional level: Independent in ADLs/IADLs  Current functional level: Independent in ADLs/IADLs    PT AM-PAC:   /24  OT AM-PAC:   /24    Family can provide assistance at DC: Yes  Would you like Case Management to discuss the discharge plan with any other family members/significant others, and if so, who? Yes (wife)  Plans to Return to Present Housing: Yes  Other Identified Issues/Barriers to RETURNING to current housing: low Hgb and  dizzy  Potential Assistance needed at discharge: N/A            Potential DME:    Patient expects to discharge to: House  Plan for transportation at discharge:      Financial    Payor: MEDICARE / Plan: MEDICARE PART A AND B / Product Type: *No Product type* /     Does insurance require precert for SNF: No    Potential assistance Purchasing Medications:    Meds-to-Beds request:        [id number] [place], [place] - [address] - P [phone] - F [phone]  [address] [facility] [address]  Phone: [phone] Fax: [phone]    [id number] [place], [place] - [address] [facility] - P [phone] - F [phone]  [address] [place] [id number]  Phone: [phone] Fax: [phone]      Notes:    Factors facilitating achievement of predicted outcomes: Family support, Cooperative, and Pleasant    Barriers to discharge: Medical complications and Medication managment    Additional Case Management Notes: CM met with patient at bedside. Pt is from home with wife, Independent. No CM needs noted at this time.     The Plan for Transition of Care is related to the following treatment goals of Neutropenic fever (HCC) [D70.9, R50.81]  Neutropenic sepsis (HCC) [A41.9, D70.9]    IF APPLICABLE: The Patient and/or patient representative Arpit and his family were provided with a choice of provider and agrees with the discharge plan. Freedom of choice list with basic dialogue that supports the patient's individualized plan of care/goals and shares the quality data associated with the providers was provided to:     Patient Representative Name:       The Patient and/or Patient Representative Agree with the Discharge Plan?      Sandra Calero RN  Case Management Department  Ph: [phone] Fax: [phone]

## 2024-01-25 PROBLEM — K12.33 MUCOSITIS DUE TO RADIATION THERAPY: Status: ACTIVE | Noted: 2024-01-25

## 2024-01-25 PROBLEM — D50.0 IRON DEFICIENCY ANEMIA DUE TO CHRONIC BLOOD LOSS: Status: ACTIVE | Noted: 2024-01-25

## 2024-01-25 LAB
ALBUMIN SERPL-MCNC: 3.1 G/DL (ref 3.4–5)
ALBUMIN/GLOB SERPL: 1 {RATIO} (ref 1.1–2.2)
ALP SERPL-CCNC: 81 U/L (ref 40–129)
ALT SERPL-CCNC: 9 U/L (ref 10–40)
ANION GAP SERPL CALCULATED.3IONS-SCNC: 11 MMOL/L (ref 3–16)
AST SERPL-CCNC: 17 U/L (ref 15–37)
BACTERIA THROAT AEROBE CULT: NORMAL
BASOPHILS # BLD: 0 K/UL (ref 0–0.2)
BASOPHILS NFR BLD: 0.3 %
BILIRUB SERPL-MCNC: 0.7 MG/DL (ref 0–1)
BLOOD BANK DISPENSE STATUS: NORMAL
BLOOD BANK PRODUCT CODE: NORMAL
BPU ID: NORMAL
BUN SERPL-MCNC: 14 MG/DL (ref 7–20)
CALCIUM SERPL-MCNC: 8.4 MG/DL (ref 8.3–10.6)
CHLORIDE SERPL-SCNC: 101 MMOL/L (ref 99–110)
CO2 SERPL-SCNC: 20 MMOL/L (ref 21–32)
CREAT SERPL-MCNC: 1 MG/DL (ref 0.8–1.3)
DEPRECATED RDW RBC AUTO: 19.3 % (ref 12.4–15.4)
DESCRIPTION BLOOD BANK: NORMAL
EOSINOPHIL # BLD: 0 K/UL (ref 0–0.6)
EOSINOPHIL NFR BLD: 0.1 %
FOLATE SERPL-MCNC: >20 NG/ML (ref 4.78–24.2)
FUNGUS SPEC CULT: ABNORMAL
GFR SERPLBLD CREATININE-BSD FMLA CKD-EPI: >60 ML/MIN/{1.73_M2}
GLUCOSE SERPL-MCNC: 101 MG/DL (ref 70–99)
HAPTOGLOB SERPL-MCNC: 198 MG/DL (ref 30–200)
HCT VFR BLD AUTO: 19.4 % (ref 40.5–52.5)
HCT VFR BLD AUTO: 21.2 % (ref 40.5–52.5)
HGB BLD-MCNC: 6.7 G/DL (ref 13.5–17.5)
HGB BLD-MCNC: 7.3 G/DL (ref 13.5–17.5)
LOEFFLER MB STN SPEC: ABNORMAL
LYMPHOCYTES # BLD: 0.4 K/UL (ref 1–5.1)
LYMPHOCYTES NFR BLD: 20.1 %
MAGNESIUM SERPL-MCNC: 1.6 MG/DL (ref 1.8–2.4)
MCH RBC QN AUTO: 31.8 PG (ref 26–34)
MCHC RBC AUTO-ENTMCNC: 34.7 G/DL (ref 31–36)
MCV RBC AUTO: 91.7 FL (ref 80–100)
MONOCYTES # BLD: 0.3 K/UL (ref 0–1.3)
MONOCYTES NFR BLD: 13 %
NEUTROPHILS # BLD: 1.3 K/UL (ref 1.7–7.7)
NEUTROPHILS NFR BLD: 66.5 %
ORGANISM: ABNORMAL
PLATELET # BLD AUTO: 80 K/UL (ref 135–450)
PMV BLD AUTO: 7.2 FL (ref 5–10.5)
POTASSIUM SERPL-SCNC: 3.4 MMOL/L (ref 3.5–5.1)
PROT SERPL-MCNC: 6.1 G/DL (ref 6.4–8.2)
RBC # BLD AUTO: 2.12 M/UL (ref 4.2–5.9)
SODIUM SERPL-SCNC: 132 MMOL/L (ref 136–145)
VANCOMYCIN SERPL-MCNC: 12.6 UG/ML
VIT B12 SERPL-MCNC: 772 PG/ML (ref 211–911)
WBC # BLD AUTO: 1.9 K/UL (ref 4–11)

## 2024-01-25 PROCEDURE — 85014 HEMATOCRIT: CPT

## 2024-01-25 PROCEDURE — 97535 SELF CARE MNGMENT TRAINING: CPT

## 2024-01-25 PROCEDURE — C9113 INJ PANTOPRAZOLE SODIUM, VIA: HCPCS | Performed by: INTERNAL MEDICINE

## 2024-01-25 PROCEDURE — 83735 ASSAY OF MAGNESIUM: CPT

## 2024-01-25 PROCEDURE — 6370000000 HC RX 637 (ALT 250 FOR IP): Performed by: FAMILY MEDICINE

## 2024-01-25 PROCEDURE — 97165 OT EVAL LOW COMPLEX 30 MIN: CPT

## 2024-01-25 PROCEDURE — A4216 STERILE WATER/SALINE, 10 ML: HCPCS | Performed by: INTERNAL MEDICINE

## 2024-01-25 PROCEDURE — 97116 GAIT TRAINING THERAPY: CPT

## 2024-01-25 PROCEDURE — 2580000003 HC RX 258: Performed by: FAMILY MEDICINE

## 2024-01-25 PROCEDURE — 6360000002 HC RX W HCPCS: Performed by: INTERNAL MEDICINE

## 2024-01-25 PROCEDURE — 92610 EVALUATE SWALLOWING FUNCTION: CPT

## 2024-01-25 PROCEDURE — 2580000003 HC RX 258: Performed by: INTERNAL MEDICINE

## 2024-01-25 PROCEDURE — 1200000000 HC SEMI PRIVATE

## 2024-01-25 PROCEDURE — 77014 HC CT TREATMENT PLAN: CPT

## 2024-01-25 PROCEDURE — 85025 COMPLETE CBC W/AUTO DIFF WBC: CPT

## 2024-01-25 PROCEDURE — 6370000000 HC RX 637 (ALT 250 FOR IP): Performed by: NURSE PRACTITIONER

## 2024-01-25 PROCEDURE — 77412 RADIATION TX DELIVERY LVL 3: CPT

## 2024-01-25 PROCEDURE — 85018 HEMOGLOBIN: CPT

## 2024-01-25 PROCEDURE — 6360000002 HC RX W HCPCS: Performed by: FAMILY MEDICINE

## 2024-01-25 PROCEDURE — 97161 PT EVAL LOW COMPLEX 20 MIN: CPT

## 2024-01-25 PROCEDURE — 6370000000 HC RX 637 (ALT 250 FOR IP): Performed by: INTERNAL MEDICINE

## 2024-01-25 PROCEDURE — 80202 ASSAY OF VANCOMYCIN: CPT

## 2024-01-25 PROCEDURE — 80053 COMPREHEN METABOLIC PANEL: CPT

## 2024-01-25 PROCEDURE — 36430 TRANSFUSION BLD/BLD COMPNT: CPT

## 2024-01-25 PROCEDURE — 97530 THERAPEUTIC ACTIVITIES: CPT

## 2024-01-25 PROCEDURE — 92526 ORAL FUNCTION THERAPY: CPT

## 2024-01-25 RX ORDER — OXYCODONE HCL 20 MG/ML
10 CONCENTRATE, ORAL ORAL EVERY 4 HOURS PRN
Status: DISCONTINUED | OUTPATIENT
Start: 2024-01-25 | End: 2024-01-26 | Stop reason: HOSPADM

## 2024-01-25 RX ORDER — ACETAMINOPHEN 160 MG/5ML
650 LIQUID ORAL EVERY 4 HOURS PRN
Status: DISCONTINUED | OUTPATIENT
Start: 2024-01-25 | End: 2024-01-26 | Stop reason: HOSPADM

## 2024-01-25 RX ORDER — SODIUM CHLORIDE 9 MG/ML
INJECTION, SOLUTION INTRAVENOUS PRN
Status: DISCONTINUED | OUTPATIENT
Start: 2024-01-25 | End: 2024-01-26 | Stop reason: HOSPADM

## 2024-01-25 RX ADMIN — CEFEPIME 2000 MG: 2 INJECTION, POWDER, FOR SOLUTION INTRAVENOUS at 22:48

## 2024-01-25 RX ADMIN — CEFEPIME 2000 MG: 2 INJECTION, POWDER, FOR SOLUTION INTRAVENOUS at 16:55

## 2024-01-25 RX ADMIN — SODIUM CHLORIDE, PRESERVATIVE FREE 10 ML: 5 INJECTION INTRAVENOUS at 20:07

## 2024-01-25 RX ADMIN — SODIUM CHLORIDE, PRESERVATIVE FREE 40 MG: 5 INJECTION INTRAVENOUS at 08:24

## 2024-01-25 RX ADMIN — Medication 10 MG: at 17:01

## 2024-01-25 RX ADMIN — NYSTATIN 5 ML: 100000 SUSPENSION ORAL at 21:37

## 2024-01-25 RX ADMIN — POTASSIUM BICARBONATE 40 MEQ: 782 TABLET, EFFERVESCENT ORAL at 08:22

## 2024-01-25 RX ADMIN — SODIUM CHLORIDE, PRESERVATIVE FREE 40 MG: 5 INJECTION INTRAVENOUS at 20:03

## 2024-01-25 RX ADMIN — CEFEPIME 2000 MG: 2 INJECTION, POWDER, FOR SOLUTION INTRAVENOUS at 06:17

## 2024-01-25 RX ADMIN — Medication 5 MG: at 20:03

## 2024-01-25 RX ADMIN — OXYCODONE 5 MG: 5 TABLET ORAL at 08:23

## 2024-01-25 RX ADMIN — VANCOMYCIN HYDROCHLORIDE 1000 MG: 10 INJECTION, POWDER, LYOPHILIZED, FOR SOLUTION INTRAVENOUS at 16:57

## 2024-01-25 RX ADMIN — OXYCODONE 5 MG: 5 TABLET ORAL at 01:59

## 2024-01-25 RX ADMIN — MAGNESIUM SULFATE HEPTAHYDRATE 2000 MG: 40 INJECTION, SOLUTION INTRAVENOUS at 20:04

## 2024-01-25 RX ADMIN — Medication 10 MG: at 21:15

## 2024-01-25 RX ADMIN — SODIUM CHLORIDE, PRESERVATIVE FREE 10 ML: 5 INJECTION INTRAVENOUS at 08:28

## 2024-01-25 RX ADMIN — VANCOMYCIN HYDROCHLORIDE 750 MG: 10 INJECTION, POWDER, LYOPHILIZED, FOR SOLUTION INTRAVENOUS at 03:37

## 2024-01-25 RX ADMIN — ATORVASTATIN CALCIUM 40 MG: 40 TABLET, FILM COATED ORAL at 08:24

## 2024-01-25 RX ADMIN — SODIUM CHLORIDE: 9 INJECTION, SOLUTION INTRAVENOUS at 03:41

## 2024-01-25 ASSESSMENT — PAIN SCALES - GENERAL
PAINLEVEL_OUTOF10: 8
PAINLEVEL_OUTOF10: 1
PAINLEVEL_OUTOF10: 2
PAINLEVEL_OUTOF10: 0
PAINLEVEL_OUTOF10: 8
PAINLEVEL_OUTOF10: 9
PAINLEVEL_OUTOF10: 7
PAINLEVEL_OUTOF10: 0
PAINLEVEL_OUTOF10: 2
PAINLEVEL_OUTOF10: 5
PAINLEVEL_OUTOF10: 0

## 2024-01-25 ASSESSMENT — PAIN DESCRIPTION - ONSET
ONSET: ON-GOING

## 2024-01-25 ASSESSMENT — PAIN DESCRIPTION - LOCATION
LOCATION: MOUTH;THROAT
LOCATION: MOUTH;THROAT
LOCATION_2: KNEE
LOCATION: THROAT
LOCATION: THROAT
LOCATION: MOUTH;THROAT

## 2024-01-25 ASSESSMENT — PAIN DESCRIPTION - PAIN TYPE
TYPE: ACUTE PAIN
TYPE: CHRONIC PAIN
TYPE: ACUTE PAIN
TYPE: CHRONIC PAIN

## 2024-01-25 ASSESSMENT — PAIN - FUNCTIONAL ASSESSMENT
PAIN_FUNCTIONAL_ASSESSMENT_SITE2: ACTIVITIES ARE NOT PREVENTED
PAIN_FUNCTIONAL_ASSESSMENT: ACTIVITIES ARE NOT PREVENTED

## 2024-01-25 ASSESSMENT — PAIN DESCRIPTION - ORIENTATION
ORIENTATION: INNER;MID
ORIENTATION: MID;INNER
ORIENTATION: INNER
ORIENTATION_2: RIGHT
ORIENTATION: MID;INNER

## 2024-01-25 ASSESSMENT — PAIN DESCRIPTION - DESCRIPTORS
DESCRIPTORS: BURNING
DESCRIPTORS_2: ACHING
DESCRIPTORS: ACHING
DESCRIPTORS: BURNING
DESCRIPTORS: SORE

## 2024-01-25 ASSESSMENT — PAIN DESCRIPTION - INTENSITY
RATING_2: 0
RATING_2: 2

## 2024-01-25 ASSESSMENT — PAIN DESCRIPTION - FREQUENCY
FREQUENCY: CONTINUOUS

## 2024-01-25 NOTE — CONSULTS
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 Vinson  is a 73 y.o. male we are seeing in consultation for Squamous cell carcinoma of the tonsil.  Patient is well-known to us and was currently receiving combined modality chemoradiation with weekly cisplatin.  As of yesterday, he had completed 5400/7000 cGy or 28/35 fractions to the tonsil and regional lymph nodes.    His treatment has been marked by posterior oropharyngeal mucositis related to treatment, was only able to take sips of liquids by mouth.  Patient also has a G-tube however has been inconsistent with maintaining nutritional intake via the tube as well.    Patient has Magic mouthwash which helped when used.    Using Aquaphor regularly to the neck.    Has liquid oxycodone ordered as an outpatient but was hesitant to take it except at nighttime because of drowsiness.    Patient frequently reports problems with increased mucus buildup however has been inconsistent with taking Mucinex DM to help clear this.    Of note, patient admits to drinking 2 vodka and tonics per night during treatment.    He has been active with Faith Malone RDN, JACQUE also who is helping with nutritional management.    Past Medical History:    Past Medical History:   Diagnosis Date    Arthritis     Hepatitis C     Treated    History of meniscal tear     Right knee     Hyperlipidemia     Hypertension     Impaired fasting glucose     Tinnitus     TMJ dysfunction     Tonsillar mass     right     Past Surgical History:    Past Surgical History:   Procedure Laterality Date    CATARACT  coordination of care with other health care professionals     (   )   Ordering of unique tests, medications, or procedures     (   )   Documentation within the EHR       LITO [place]    This visit was completed in consultation with Dr. Gómez  Please Contact Through [employer]

## 2024-01-25 NOTE — PROGRESS NOTES
Clinical Pharmacy Progress Note    Vancomycin - Management by Pharmacy    Consult Date(s): 1/23/24  Consulting Provider(s): Dr. Santana    Assessment / Plan  Febrile neutropenia - Vancomycin  Concurrent Antimicrobials:    Cefepime - day #3  Day of Vanc Therapy / Ordered Duration: #3  Current Dosing Method: Bayesian-Guided AUC Dosing  Therapeutic Goal: -600 mg/L*hr  Current Dose / Plan:   SCr continues to improve (1.4?1.2?1).   Baseline SCr 0.8-0.9 (Dec 2023)  On 750mg IV q12h  Level today = 12.6 mg/L - drawn ~10h after prior dose  Calculated AUC is 363 mg/L*h  Given AUC < 400 - will increase dose to 1000mg IV q12h.  Regimen predicts AUC of 471 mg/L*h  Plan to repeat level in ~2-3 days (or sooner if clinically indicated)  Will continue to monitor clinical condition and make adjustments to regimen as appropriate.    Please call with questions--  Maura Padgett, PharmD, [facility]  Wireless: [phone]   1/25/2024 9:43 AM      Interval update: S/p EGD 1/24 - several ulcers biopsied; GI recommending PPI BID x 8 weeks. Remains afebrile.    Subjective/Objective:   Arpit Vinson is a 73 y.o. male with a PMHx significant for HTN, tonsil cancer (on chemotherapy/radiation) who presented to ED from infusion center with anemia, dizzness; found have neutropenic fever.  Admitted with sepsis, neutropenic fever, ROSANNA and concern for GI bleed.    Pharmacy is consulted to dose Vancomycin.    Ht Readings from Last 1 Encounters:   01/23/24 1.88 m (6' 2\")     Wt Readings from Last 1 Encounters:   01/25/24 103.1 kg (227 lb 4.7 oz)     Current & Prior Antimicrobial Regimen(s):  Cefepime (1/23-current)  Vancomycin - Pharmacy to dose  1000mg IV q12h (1/25-current)    Vancomycin Level(s) / Doses:  Date Time Dose Type of Level / Level Interpretation   1/25 01:50 750mg IV q12h Random = 12.6 mg/L Drawn ~10h after prior dose  AUC = 363 mg/L*h  Increase dose to 1000mg IV q12h          Note: Serum levels collected for AUC-based dosing may be

## 2024-01-25 NOTE — CARE COORDINATION
CM continues to follow for DC planning and needs. Patient did well with PT/OT today, had #29 of 35 radiation treatments and SLP bedside eval and plan for MBS 01/26/24 with SLP. MD continues to monitor H/H and will transfuse as needed. Oncology following with patient.    Patient plans for return home with spouse at DC and will continue OP oncology treatments per orders.    CM will continue to follow and assist with DC planning and needs.    Thank you,  Jeremy GARCIAN, RN,   4th Floor Progressive Care Unit  [phone]

## 2024-01-25 NOTE — PROGRESS NOTES
Physical Therapy  [address]  Physical Therapy Initial Assessment/Treatment/Discharge Summary     Name: Arpit Vinson  : 1950  MRN: [id number]  Date of Service: 2024    Discharge Recommendations:  Home independently, Home with assist PRN   PT Equipment Recommendations  Equipment Needed: No      Patient Diagnosis(es): The primary encounter diagnosis was Neutropenic fever (HCC). A diagnosis of Drop in hemoglobin was also pertinent to this visit.  Past Medical History:  has a past medical history of Arthritis, Hepatitis C, History of meniscal tear, Hyperlipidemia, Hypertension, Impaired fasting glucose, Tinnitus, TMJ dysfunction, and Tonsillar mass.  Past Surgical History:  has a past surgical history that includes Colonoscopy (Aug., 2014 (  )); Colonoscopy (*2019 (  )); Tonsillectomy; Mouth surgery (Right, 2023); Cataract extraction (Bilateral); Port Surgery (N/A, 2023); Gastrostomy tube placement (N/A, 2023); and Upper gastrointestinal endoscopy (N/A, 2024).    Assessment   Assessment: 73 y.o. male who presented to [facility] with neutropenic sepsis with anemia. PMHx significant for tonsillar cancer receiving radiation and chemo on anticoagulation. Pt currently performing transfers and amb safely and independently. Pt with no further acute PT needs at this time. Will sign off from PT services.  Therapy Prognosis: Excellent  Decision Making: Low Complexity  Barriers to Learning: none  Requires PT Follow-Up: No  Activity Tolerance  Activity Tolerance: Patient tolerated evaluation without incident;Patient tolerated treatment well     Plan   Physical Therapy Plan  General Plan: Discharge with evaluation only  Safety Devices  Type of Devices: Left in chair, Call light within reach, Nurse notified (no alarm in use per RN, pt has been up ad tre)     Restrictions  Position Activity Restriction  Other position/activity restrictions: up as tolerated

## 2024-01-25 NOTE — PROGRESS NOTES
esophagitis.  There is ulceration and friability throughout the pharynx  All biopsy areas watched to ensure no bleeding    Problem List  Patient Active Problem List   Diagnosis    Primary hypertension    Pure hypercholesterolemia    Viral hepatitis C    Tobacco dependence syndrome    Class 1 obesity due to excess calories with serious comorbidity and body mass index (BMI) of 30.0 to 30.9 in adult    Benign prostatic hyperplasia (BPH) with urinary urgency    Bruising    Atherosclerosis of native coronary artery of native heart    Alcoholic cirrhosis of liver without ascites (HCC)    Pain and swelling of lower extremity    Chronic pain of both knees    Left hand pain    Chronic gout of left foot    Weight gain    Hyperglycemia    Anemia, unspecified    Gum symptoms    Sprain of temporomandibular joint or ligament    Lymphadenopathy    Tonsillar mass    Neck mass    Alcohol abuse, daily use    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    Gout    Hepatic cirrhosis (HCC)    Hyperlipidemia    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 He has received 4 of 7 planned treatments, last treated 1/16/24  - Recommend strict abstinence from alcohol  - Non-compliance with appointments and supportive care has resulted in interruptions in treatment; weight loss; malnutrition     GI bleed  He had positive hemoccult stool x 3 1/8/24  Hgb 5.9->6.3->5.1->transfuse 2u PRBC -> 7.0      Pancytopenia    Cytopenias are multifactorial -- chemotherapy, Cirrhosis, poor nutrition  ANC 1300 on admission   S/p PRBCs on admission  Hgb 7 this morning  Await iron studies, B12 folate, LDH, haptoglobin      Fever   Continue antibiotics   Blood cultures NGTD   He remains afebrile      Nutrition  Seen by Faith Malone 1/19/24--reported barriers to care   Continues to lose weight due to poor nutrition  - Non-compliant with tube feeds  Consult Dietician for assistance    ONCOLOGIC DISPOSITION:  Per primary team    Curt Ashby MD  Please contact through [employer]

## 2024-01-25 NOTE — PROGRESS NOTES
Pt seen and examined  States he is ok denies having complaints  Denies nausea or vomiting  No melena  No hematochezia  No chest pain or shortness of breath  States eating has become slightly more difficult  Vitals:    01/25/24 1142   BP: 122/71   Pulse: 69   Resp: 16   Temp: 97.9 °F (36.6 °C)   SpO2: 99%      Gen: pleasant alert oriented  Neck: no jvd  Chest clear bilaterally  Cvs: s2s1 no murmurs  Abd: soft nd nt bs normal active  Ext: no edema    Neuro: grossly normal no signs of asterixis    Labs    K is 3.4 sodium 132 bun 20 creatinine 1.0 mag is 1.4    H/h 7 and 19.8 after blood    72 yo male admitted with 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  On chemoradiation with cisplatin; last treatment 1/16; admitted for pancytopenia, concern for acute blood loss anemia and gi bleed and several electrolyte derangements.  Clinically he is doing well, discussed case at length with patient and wife at bedside.  Discussed dietary compliance will have nutrition eval while here.  Discussed case with heme onc await cultures, resume diet .  EGD yesterday showed:  Normal second portion; large scar from previous ulcer in the bulb with small residual ulcer present.     Stomach: healing clean based ulcers in the antrum with scarring and biopsies obtained. 5mm clean based ulcer in the body and edges biopsied. PEG present in body and pushed in and there is clean based ulceration around the orifice.  Retroflexion did not show a hiatal hernia.    Continue with ppi bid for 8 weeks, continuing to hold eliquis given anemia.    Pt has a hixtory of liver disease from etoh consumption he states he does not drink more than a small amount daily.  Continue with current care and monitoring, d/w nurse and social work  will try to change meds to liquid form.  He is on an arsenal of medications at home which he has neither needed or required since admission.  Echo reviewed, his  chambers appear normal, cardiac issues may have been a result of his heavy alcohol use in the past but he has not been on alcohol since.  I'm holding his lasix and aldactone and tenormin, his bp is fine his hr is fine.

## 2024-01-25 NOTE — PLAN OF CARE
Problem: Discharge Planning  Goal: Discharge to home or other facility with appropriate resources  Outcome: Progressing     Problem: Pain  Goal: Verbalizes/displays adequate comfort level or baseline comfort level  Outcome: Progressing     Problem: Safety - Adult  Goal: Free from fall injury  Outcome: Progressing     Problem: Nutrition Deficit:  Goal: Optimize nutritional status  Outcome: Progressing     Problem: SLP Adult - Impaired Swallowing  Goal: By Discharge: Advance to least restrictive diet without signs or symptoms of aspiration for planned discharge setting.  See evaluation for individualized goals.  1/25/2024 1433 by Romeo Gannon, SLP  Outcome: Progressing

## 2024-01-25 NOTE — PROGRESS NOTES
ONCOLOGY HEMATOLOGY CARE PROGRESS NOTE      SUBJECTIVE:  Today, Arpit reports improved energy somewhat after getting blood product yesterday.  His radiation treatment was held yesterday.  He reports stable oral cavity/oropharyngeal pain, managed with pain med and Magic mouthwash.    He underwent EGD yesterday to evaluate a source of bleeding.  This showed as expected mucositis in the pharynx, no clear source of bleed in the stomach, did show some healing clean-based ulcers with scarring, which were biopsied and path is pending. Did not get colonoscopy.     ROS:       OBJECTIVE        Physical    VITALS:  Patient Vitals for the past 24 hrs:   BP Temp Temp src Pulse Resp SpO2 Weight   01/25/24 0818 112/63 97.9 °F (36.6 °C) Oral 69 16 99 % --   01/25/24 0600 -- -- -- -- -- -- 103.1 kg (227 lb 4.7 oz)   01/25/24 0330 113/65 98 °F (36.7 °C) Oral 65 18 97 % --   01/25/24 0229 -- -- -- -- 18 -- --   01/24/24 2345 112/66 98.1 °F (36.7 °C) Oral 69 18 96 % --   01/24/24 2301 -- -- -- -- 20 -- --   01/24/24 2015 137/74 97.6 °F (36.4 °C) Axillary 74 20 99 % --   01/24/24 1903 -- -- -- -- 18 -- --   01/24/24 1833 -- -- -- 75 -- -- --   01/24/24 1523 118/73 98.3 °F (36.8 °C) Oral 83 18 99 % --   01/24/24 1432 114/85 98.5 °F (36.9 °C) Temporal 82 18 98 % --   01/24/24 1430 109/63 98.2 °F (36.8 °C) Temporal 73 20 99 % --   01/24/24 1422 (!) 117/59 98.5 °F (36.9 °C) Temporal 74 20 98 % --   01/24/24 1413 108/70 98.6 °F (37 °C) Temporal 78 20 100 % --   01/24/24 1310 122/69 98.2 °F (36.8 °C) Temporal 69 16 99 % --   01/24/24 1105 119/74 98.2 °F (36.8 °C) Oral 81 18 97 % 103.1 kg (227 lb 4.7 oz)   01/24/24 1008 119/73 97.9 °F (36.6 °C) Oral 83 18 99 % --       24HR INTAKE/OUTPUT:    Intake/Output Summary (Last 24 hours) at 1/25/2024 0940  Last data filed at 1/24/2024 2256  Gross per 24 hour   Intake 2587.63 ml   Output 600 ml   Net 1987.63 ml       CONSTITUTIONAL: awake, alert, cooperative, no

## 2024-01-25 NOTE — PROGRESS NOTES
Unable to locate 2 [employer] Tube Feeds to start overnight Tube Feedings. Charge RN and House Supervisor unable to locate 2 [employer]. [employer] doctor to see if ok to wait until dayshift to locate 2 Rosendo for patient. OK to wait, per Osmar AGUILERA

## 2024-01-25 NOTE — PROGRESS NOTES
Occupational Therapy  Facility/Department: [address]  Occupational Therapy Initial Assessment/Treatment and Discharge    Name: Arpit Vinson  : 1950  MRN: [id number]  Date of Service: 2024    Discharge Recommendations:   Home with assist prn, no OT needs  OT Equipment Recommendations  Equipment Needed: No    Assessment   Assessment: Pt presents near his recent baseline for ADLs and mobility, has been up ad tre in the room and demo independence with ADLs and hallway ambulation this date. Pt reports decreased strength and activity tolerance related to his cancer treatment. Educated pt on gradual increase in activity upon d/c home. Pt has no acute skilled OT needs, will sign off. Encouraged pt to contniue ambulation and ADL engagement during hospital stay  Decision Making: Low Complexity  REQUIRES OT FOLLOW-UP: No  Activity Tolerance  Activity Tolerance: Patient Tolerated treatment well        Plan     D/c OT    Restrictions  Position Activity Restriction  Other position/activity restrictions: up as tolerated    Subjective   General  Chart Reviewed: Yes  Patient assessed for rehabilitation services?: Yes  Additional Pertinent Hx: 73 y.o. male who presented from chemo infusion facility  for low Hgb and feeling dizzy. Pt admitted with neutropenic sepsis with anemia. PMHx significant for tonsillar cancer receiving radiation and chemo on anticoagulation.  Family / Caregiver Present: No  Referring Practitioner: Orville Santana MD  Diagnosis: neutropenic sepsis  Subjective  Subjective: Pt seated in chair upon OT entry, agreeable to OT evaluation. Pain: none       Social/Functional History  Social/Functional History  Lives With: Spouse  Type of Home: House  Home Layout: Two level, Able to Live on Main level with bedroom/bathroom (bed/bath on 1st floor)  Home Access: Stairs to enter with rails  Entrance Stairs - Number of Steps: 3+5 DEAN  Bathroom Shower/Tub: Tub/Shower unit  Bathroom Toilet: Standard  (sink for leverage)  Bathroom Equipment: Grab bars in shower  Home Equipment: Cane, Crutches  Has the patient had two or more falls in the past year or any fall with injury in the past year?: No  ADL Assistance: Independent  Homemaking Assistance: Independent (shares with wife)  Ambulation Assistance: Independent  Transfer Assistance: Independent  Active : Yes  Occupation: Retired  Leisure & Hobbies: going to the gym, fishing, painting  Additional Comments: wife able to provide assist at d/c       Objective     Toilet Transfers  Equipment Used: Standard toilet  Toilet Transfer: Independent    UE Function  AROM: Within functional limits  Strength: Within functional limits    ADL  Feeding: Independent (drink from cup)  Grooming: Independent (brush teeth standing at sink)  LE Dressing: Independent  Toileting: Independent (simulated, denied need)    Transfers  Sit to stand: Independent  Stand to sit: Independent    Functional Mobility  Equipment: no AD (IV follow)  Level of Assist: independent  Distance: to/from bathroom, >400' hallway  Comment: pt steady, no LOB observed    Sitting Balance: independent  Standing Balance: independent    Vision  Vision: Within Functional Limits  Hearing  Hearing: Within functional limits  Cognition  Overall Cognitive Status: WFL  Orientation  Overall Orientation Status: Within Normal Limits  Orientation Level: Oriented X4      Education Given To: Patient  Education Provided: Role of Therapy;Plan of Care;Precautions;ADL Adaptive Strategies;Transfer Training  Education Method: Verbal  Barriers to Learning: None  Education Outcome: Verbalized understanding;Demonstrated understanding    AM-PAC - ADL  AM-PAC Daily Activity - Inpatient   How much help is needed for putting on and taking off regular lower body clothing?: None  How much help is needed for bathing (which includes washing, rinsing, drying)?: None  How much help is needed for toileting (which includes using toilet, bedpan, or

## 2024-01-25 NOTE — PROGRESS NOTES
Speech Language Pathology  [address]Department[address]   Dysphagia Evaluation/treat    Name: Arpit Vinson  : 1950  MRN: [id number]                                                     Patient Diagnosis(es):   Patient Active Problem List    Diagnosis Date Noted    Gum symptoms 2022    Chronic gout of left foot 2022    Weight gain 2022    Hyperglycemia 2022    Anemia, unspecified 2022    Left hand pain 2022    Pain and swelling of lower extremity 2022    Chronic pain of both knees 2022    Severe malnutrition (HCC) 2024    Neutropenic sepsis (HCC) 2024    Secondary hypercoagulable state (HCC) 2024    Post-op pain 2023    Paroxysmal atrial fibrillation (HCC) 2023    Atrial fibrillation with rapid ventricular response (HCC) 2023    Alcohol abuse, daily use 2023    Gout 2023    Hepatic cirrhosis (HCC) 2023    Hyperlipidemia 2023    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    Neck mass 2023    Malignant neoplasm of tonsil (HCC) 2023    Tonsillar mass 2023    Lymphadenopathy 10/05/2023    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 hypertension 10/10/2011     Past Medical History:   Diagnosis Date    Arthritis     Hepatitis C     Treated    History of meniscal  recommendations: RN    Treatment:  Dysphagia Goals:  The pt will be seen  to address the following goals:   Goal 1: Patient/caregiver will demonstrate understanding of dysphagia recommendations/concerns  1/25: Educated pt to purpose of visit, s/s of aspiration, concern if aspiration occurs and  rationale for MBS study and rationale. Discussed trial of diet vs cont ice chips until MBS.  Decision was made to cont ice chips and plan for MBS tomorrow.   Pt stated comprehension and agreeable  Cont goal    Goal 2: Patient will participate in instrumental assessment of swallow function as appropriate.    Education  Please see above.    Pt goal: to be able to eat  Pt discharge goal: to go home    Total treatment time:  15 eval 10 treat    Plan: Continue per POC  Recommended Diet: cont NPO  Oral care/ice chips   MBS 1/26/24  Medication administration: as cecilio    Strategies:   TBD after MBS    Discharge Recommendation: TBD after MBS  Discussed with Ross FOREMAN  Needs met prior to leaving room, call light within reach    LU CRUZ M.S./CCC-SLP #[id number]  Pg. # [phone]  This document will serve as a dc summary if pt dc prior to next visit

## 2024-01-25 NOTE — ONCOLOGY
[facility] RADIATION ONCOLOGY    [phone]    DATE:1/25/24    AREA TREATED:HEAD AND NECK    DAILY DOSE: 200 cGy    CUMULATIVE DOSE: 5800 cGy    #  29 OUT OF 35 TREATMENTS    NEXT PLANNED TREATMENT  DATE:FRIDAY 1/26    Daily Treatments are Monday through Friday      INPATIENT CODING:  Beam Radiation   Photons   Photon energy : 6 MV

## 2024-01-25 NOTE — PLAN OF CARE
Problem: Nutrition Deficit:  Goal: Optimize nutritional status  Outcome: Not Progressing     Problem: Discharge Planning  Goal: Discharge to home or other facility with appropriate resources  1/24/2024 1950 by Ross Jimenes RN  Outcome: Progressing  1/24/2024 0739 by Magnolia Evangelista RN  Outcome: Progressing  Flowsheets (Taken 1/23/2024 1955)  Discharge to home or other facility with appropriate resources:   Identify barriers to discharge with patient and caregiver   Arrange for needed discharge resources and transportation as appropriate   Identify discharge learning needs (meds, wound care, etc)     Problem: Pain  Goal: Verbalizes/displays adequate comfort level or baseline comfort level  1/24/2024 1950 by Ross Jimenes RN  Outcome: Progressing  1/24/2024 0739 by Magnolia Evangelista RN  Outcome: Progressing  Flowsheets (Taken 1/23/2024 1844 by Stephanie Fraser, RN)  Verbalizes/displays adequate comfort level or baseline comfort level: Encourage patient to monitor pain and request assistance     Problem: ABCDS Injury Assessment  Goal: Absence of physical injury  1/24/2024 0739 by Magnolia Evangelista RN  Outcome: Progressing     Problem: Safety - Adult  Goal: Free from fall injury  Outcome: Progressing     Problem: Nutrition Deficit:  Goal: Optimize nutritional status  Outcome: Not Progressing

## 2024-01-25 NOTE — PLAN OF CARE
Problem: Discharge Planning  Goal: Discharge to home or other facility with appropriate resources  1/25/2024 0024 by Maldonado Bates RN  Outcome: Progressing  1/24/2024 1950 by Ross Jimenes RN  Outcome: Progressing   Note: Patient to discharge home with spouse    Problem: Pain  Goal: Verbalizes/displays adequate comfort level or baseline comfort level  1/25/2024 0024 by Maldonado Bates RN  Outcome: Progressing  1/24/2024 1950 by Ross Jimenes RN  Outcome: Progressing   Note: Pain is adequately controlled    Problem: ABCDS Injury Assessment  Goal: Absence of physical injury  Outcome: Progressing   Note: Patient free from injury    Problem: Safety - Adult  Goal: Free from fall injury  1/25/2024 0024 by Maldonado Bates RN  Outcome: Progressing  1/24/2024 1950 by Ross Jimenes RN  Outcome: Progressing   Note: Patient free from falls    Problem: Nutrition Deficit:  Goal: Optimize nutritional status  1/25/2024 0024 by Maldonado Bates RN  Outcome: Progressing  1/24/2024 1950 by Ross Jimenes RN  Outcome: Not Progressing   Note: Patient to start tube feeds    Problem: Nutrition Deficit:  Goal: Optimize nutritional status  1/25/2024 0024 by Maldonado Bates RN  Outcome: Progressing  1/24/2024 1950 by Ross Jimenes RN  Outcome: Not Progressing   Note: Patient to start tube feeds

## 2024-01-26 ENCOUNTER — APPOINTMENT (OUTPATIENT)
Dept: GENERAL RADIOLOGY | Age: 74
End: 2024-01-26
Payer: MEDICARE

## 2024-01-26 VITALS
SYSTOLIC BLOOD PRESSURE: 120 MMHG | TEMPERATURE: 98.1 F | DIASTOLIC BLOOD PRESSURE: 71 MMHG | OXYGEN SATURATION: 98 % | RESPIRATION RATE: 18 BRPM | BODY MASS INDEX: 28.94 KG/M2 | HEART RATE: 76 BPM | WEIGHT: 225.53 LBS | HEIGHT: 74 IN

## 2024-01-26 LAB
ALBUMIN SERPL-MCNC: 3.1 G/DL (ref 3.4–5)
ALBUMIN/GLOB SERPL: 1.1 {RATIO} (ref 1.1–2.2)
ALP SERPL-CCNC: 88 U/L (ref 40–129)
ALT SERPL-CCNC: 9 U/L (ref 10–40)
ANION GAP SERPL CALCULATED.3IONS-SCNC: 11 MMOL/L (ref 3–16)
AST SERPL-CCNC: 17 U/L (ref 15–37)
BASOPHILS # BLD: 0 K/UL (ref 0–0.2)
BASOPHILS NFR BLD: 0.4 %
BILIRUB SERPL-MCNC: 0.6 MG/DL (ref 0–1)
BUN SERPL-MCNC: 13 MG/DL (ref 7–20)
CALCIUM SERPL-MCNC: 8.6 MG/DL (ref 8.3–10.6)
CHLORIDE SERPL-SCNC: 101 MMOL/L (ref 99–110)
CO2 SERPL-SCNC: 22 MMOL/L (ref 21–32)
CREAT SERPL-MCNC: 0.9 MG/DL (ref 0.8–1.3)
DEPRECATED RDW RBC AUTO: 18.8 % (ref 12.4–15.4)
EOSINOPHIL # BLD: 0 K/UL (ref 0–0.6)
EOSINOPHIL NFR BLD: 0.2 %
FINAL REPORT: NORMAL
GFR SERPLBLD CREATININE-BSD FMLA CKD-EPI: >60 ML/MIN/{1.73_M2}
GLUCOSE SERPL-MCNC: 115 MG/DL (ref 70–99)
HCT VFR BLD AUTO: 21 % (ref 40.5–52.5)
HGB BLD-MCNC: 7.3 G/DL (ref 13.5–17.5)
LYMPHOCYTES # BLD: 0.3 K/UL (ref 1–5.1)
LYMPHOCYTES NFR BLD: 19.7 %
MAGNESIUM SERPL-MCNC: 1.6 MG/DL (ref 1.8–2.4)
MCH RBC QN AUTO: 31.7 PG (ref 26–34)
MCHC RBC AUTO-ENTMCNC: 34.7 G/DL (ref 31–36)
MCV RBC AUTO: 91.5 FL (ref 80–100)
MONOCYTES # BLD: 0.3 K/UL (ref 0–1.3)
MONOCYTES NFR BLD: 15.2 %
NEUTROPHILS # BLD: 1.1 K/UL (ref 1.7–7.7)
NEUTROPHILS NFR BLD: 64.5 %
PHOSPHATE SERPL-MCNC: 3.3 MG/DL (ref 2.5–4.9)
PLATELET # BLD AUTO: 83 K/UL (ref 135–450)
PMV BLD AUTO: 6.8 FL (ref 5–10.5)
POTASSIUM SERPL-SCNC: 3.3 MMOL/L (ref 3.5–5.1)
PRELIMINARY: NORMAL
PROT SERPL-MCNC: 6 G/DL (ref 6.4–8.2)
RBC # BLD AUTO: 2.29 M/UL (ref 4.2–5.9)
SODIUM SERPL-SCNC: 134 MMOL/L (ref 136–145)
WBC # BLD AUTO: 1.7 K/UL (ref 4–11)

## 2024-01-26 PROCEDURE — 6360000002 HC RX W HCPCS: Performed by: FAMILY MEDICINE

## 2024-01-26 PROCEDURE — 77014 HC CT TREATMENT PLAN: CPT

## 2024-01-26 PROCEDURE — A4216 STERILE WATER/SALINE, 10 ML: HCPCS | Performed by: INTERNAL MEDICINE

## 2024-01-26 PROCEDURE — 6360000002 HC RX W HCPCS: Performed by: INTERNAL MEDICINE

## 2024-01-26 PROCEDURE — 84100 ASSAY OF PHOSPHORUS: CPT

## 2024-01-26 PROCEDURE — 74230 X-RAY XM SWLNG FUNCJ C+: CPT

## 2024-01-26 PROCEDURE — 77412 RADIATION TX DELIVERY LVL 3: CPT

## 2024-01-26 PROCEDURE — 80053 COMPREHEN METABOLIC PANEL: CPT

## 2024-01-26 PROCEDURE — 2580000003 HC RX 258: Performed by: INTERNAL MEDICINE

## 2024-01-26 PROCEDURE — 92611 MOTION FLUOROSCOPY/SWALLOW: CPT

## 2024-01-26 PROCEDURE — 6370000000 HC RX 637 (ALT 250 FOR IP): Performed by: INTERNAL MEDICINE

## 2024-01-26 PROCEDURE — 6370000000 HC RX 637 (ALT 250 FOR IP): Performed by: FAMILY MEDICINE

## 2024-01-26 PROCEDURE — C9113 INJ PANTOPRAZOLE SODIUM, VIA: HCPCS | Performed by: INTERNAL MEDICINE

## 2024-01-26 PROCEDURE — 92526 ORAL FUNCTION THERAPY: CPT

## 2024-01-26 PROCEDURE — 2580000003 HC RX 258: Performed by: FAMILY MEDICINE

## 2024-01-26 PROCEDURE — 85025 COMPLETE CBC W/AUTO DIFF WBC: CPT

## 2024-01-26 PROCEDURE — 83735 ASSAY OF MAGNESIUM: CPT

## 2024-01-26 RX ORDER — FLUCONAZOLE 10 MG/ML
100 POWDER, FOR SUSPENSION ORAL DAILY
Qty: 100 ML | Refills: 0 | Status: SHIPPED | OUTPATIENT
Start: 2024-01-26 | End: 2024-02-05

## 2024-01-26 RX ORDER — LANSOPRAZOLE
15 KIT
Qty: 300 ML | Refills: 2 | Status: SHIPPED | OUTPATIENT
Start: 2024-01-26 | End: 2024-04-25

## 2024-01-26 RX ORDER — ATENOLOL 50 MG/1
25 TABLET ORAL DAILY
Qty: 90 TABLET | Refills: 1 | Status: SHIPPED | OUTPATIENT
Start: 2024-01-26

## 2024-01-26 RX ORDER — OXYCODONE HCL 20 MG/ML
10 CONCENTRATE, ORAL ORAL EVERY 4 HOURS PRN
Qty: 1 EACH | Refills: 0 | Status: SHIPPED | OUTPATIENT
Start: 2024-01-26 | End: 2024-01-27 | Stop reason: HOSPADM

## 2024-01-26 RX ADMIN — PHENOL 1 SPRAY: 1.5 LIQUID ORAL at 09:41

## 2024-01-26 RX ADMIN — ATORVASTATIN CALCIUM 40 MG: 40 TABLET, FILM COATED ORAL at 09:28

## 2024-01-26 RX ADMIN — Medication 10 MG: at 13:25

## 2024-01-26 RX ADMIN — NYSTATIN 5 ML: 100000 SUSPENSION ORAL at 09:41

## 2024-01-26 RX ADMIN — MAGNESIUM SULFATE HEPTAHYDRATE 2000 MG: 40 INJECTION, SOLUTION INTRAVENOUS at 06:45

## 2024-01-26 RX ADMIN — POTASSIUM CHLORIDE 40 MEQ: 1500 TABLET, EXTENDED RELEASE ORAL at 06:43

## 2024-01-26 RX ADMIN — SODIUM CHLORIDE, PRESERVATIVE FREE 40 MG: 5 INJECTION INTRAVENOUS at 09:29

## 2024-01-26 RX ADMIN — VANCOMYCIN HYDROCHLORIDE 1000 MG: 10 INJECTION, POWDER, LYOPHILIZED, FOR SOLUTION INTRAVENOUS at 04:33

## 2024-01-26 RX ADMIN — SODIUM CHLORIDE, PRESERVATIVE FREE 10 ML: 5 INJECTION INTRAVENOUS at 09:38

## 2024-01-26 RX ADMIN — CEFEPIME 2000 MG: 2 INJECTION, POWDER, FOR SOLUTION INTRAVENOUS at 05:51

## 2024-01-26 ASSESSMENT — PAIN SCALES - GENERAL
PAINLEVEL_OUTOF10: 0
PAINLEVEL_OUTOF10: 8
PAINLEVEL_OUTOF10: 8

## 2024-01-26 ASSESSMENT — PAIN DESCRIPTION - LOCATION
LOCATION: THROAT;MOUTH
LOCATION: THROAT;MOUTH

## 2024-01-26 ASSESSMENT — PAIN DESCRIPTION - ORIENTATION
ORIENTATION: RIGHT
ORIENTATION: INNER;RIGHT

## 2024-01-26 ASSESSMENT — PAIN - FUNCTIONAL ASSESSMENT: PAIN_FUNCTIONAL_ASSESSMENT: ACTIVITIES ARE NOT PREVENTED

## 2024-01-26 ASSESSMENT — PAIN DESCRIPTION - ONSET: ONSET: ON-GOING

## 2024-01-26 ASSESSMENT — PAIN DESCRIPTION - PAIN TYPE: TYPE: ACUTE PAIN

## 2024-01-26 ASSESSMENT — PAIN DESCRIPTION - DESCRIPTORS
DESCRIPTORS: BURNING
DESCRIPTORS: BURNING

## 2024-01-26 ASSESSMENT — PAIN DESCRIPTION - INTENSITY: RATING_2: 0

## 2024-01-26 ASSESSMENT — PAIN DESCRIPTION - FREQUENCY: FREQUENCY: CONTINUOUS

## 2024-01-26 NOTE — PROCEDURES
INSTRUMENTAL SWALLOW REPORT  MODIFIED BARIUM SWALLOW/treat    NAME: Arpit Vinson     : 1950  MRN: [id number]       Date of Eval: 2024     Ordering Physician: Dr Adams  Radiologist: Dr Zhou  Referring Diagnosis: Dysphagia    Past Medical History:  has a past medical history of Arthritis, Hepatitis C, History of meniscal tear, Hyperlipidemia, Hypertension, Impaired fasting glucose, Tinnitus, TMJ dysfunction, and Tonsillar mass.  Past Surgical History:  has a past surgical history that includes Colonoscopy (Aug., 2014 (  )); Colonoscopy (*2019 (  )); Tonsillectomy; Mouth surgery (Right, 2023); Cataract extraction (Bilateral); Port Surgery (N/A, 2023); Gastrostomy tube placement (N/A, 2023); and Upper gastrointestinal endoscopy (N/A, 2024).    Imaging  XR CHEST (2 VW)   Final Result       No acute radiographic abnormality.     Prior MBS Results: none    Patient Complaints/Reason for Referral:  Arpit Vinson was referred for a MBS to assess the efficiency of his/her swallow function, assess for aspiration, and to make recommendations regarding safe dietary consistencies, effective compensatory strategies, and safe eating environment.    Onset of problem:   24    Behavior/Cognition: alert and cooperative  Vision/Hearing: WFL      Impressions:  Oral Phase  Difficulty forming bolus and slow oral transit with soft solids  Pharyngeal Phase  Pt consumed puree and thin liquids with no pen/aspiration or pharyngeal residue. However when using liquid wash to clear oral cavity of soft solid, aspiration occurred after the swallow, with no cough reflex occurring. Additional trials of thin via cup and straw were administered, again with no pen or aspiration.  Upper Esophageal Screen  No obvious backflow noted    Treatment Dx and ICD 10: dysphagia  Position: Lateral and upright  Consistencies administered: puree, thin and soft solids    Penetration Aspiration Scale

## 2024-01-26 NOTE — PROGRESS NOTES
I placed the chair pad alarm on but the pt refused to use it despite explaining the safety protocol to him. He refused to use the fall arm band too. Explained three times .

## 2024-01-26 NOTE — DISCHARGE INSTR - COC
Continuity of Care Form    Patient Name: Arpit Vinson   :  1950  MRN:  [id number]    Admit date:  2024  Discharge date:  ***    Code Status Order: Full Code   Advance Directives:   Advance Care Flowsheet Documentation       Date/Time Healthcare Directive Type of Healthcare Directive Copy in Chart Healthcare Agent Appointed Healthcare Agent's Name Healthcare Agent's Phone Number    24 1314 No, patient does not have an advance directive for healthcare treatment -- -- -- -- --            Admitting Physician:  Orville Santana MD  PCP: Sissy Evangelista MD    Discharging Nurse: ***  Discharging Hospital Unit/Room#: 4302/4302-01  Discharging Unit Phone Number: ***    Emergency Contact:   Extended Emergency Contact Information  Primary Emergency Contact: Yuki Vinson  Address: [address] [place] [address]  Home Phone: [phone]  Mobile Phone: [phone]  Relation: Spouse    Past Surgical History:  Past Surgical History:   Procedure Laterality Date    CATARACT EXTRACTION Bilateral     COLONOSCOPY  Aug., 2014 ( 2017 )    Dr. Tatum - tubular adenomas ( 3 )    COLONOSCOPY  *2019 (  )    Dr. Banuelos - tubular adenoma    GASTROSTOMY TUBE PLACEMENT N/A 2023    PERCUTANEOUS ENDOSCOPIC GASTROSTOMY TUBE INSERTION performed by Maldonado Weaver MD at [facility] OR    MOUTH SURGERY Right 2023    BIOPSY RIGHT TONSIL, DIRECT LARYNGOSCOPY performed by Leopoldo Matias MD at [facility] OR    PORT SURGERY N/A 2023    PORT-A-CATHETER PLACEMENT; performed by Maldonado Weaver MD at [facility] OR    TONSILLECTOMY      UPPER GASTROINTESTINAL ENDOSCOPY N/A 2024    EGD BIOPSY performed by Ward Gale MD at [facility] ENDOSCOPY       Immunization History:   Immunization History   Administered Date(s) Administered    COVID-19, PFIZER Bivalent, DO NOT Dilute, (age 12y+), IM, 30 mcg/0.3 mL 10/25/2022    COVID-19, PFIZER GRAY top, DO NOT Dilute, (age 12  y+), IM, 30 mcg/0.3 mL 05/10/2022    COVID-19, PFIZER PURPLE top, DILUTE for use, (age 12 y+), 30mcg/0.3mL 02/11/2021, 03/02/2021, 10/07/2021    Influenza Vaccine, unspecified formulation 01/05/2013, 01/02/2014, 11/11/2014    Influenza Virus Vaccine 01/05/2013, 01/05/2013, 01/02/2014, 11/11/2014    Influenza, FLUAD, (age 65 y+), Adjuvanted, 0.5mL 10/28/2021    Pneumococcal, PCV-13, PREVNAR 13, (age 6w+), IM, 0.5mL 10/14/2019    TDaP, ADACEL (age 10y-64y), BOOSTRIX (age 10y+), IM, 0.5mL 06/10/2014       Active Problems:  Patient Active Problem List   Diagnosis Code    Primary hypertension I10    Pure hypercholesterolemia E78.00    Viral hepatitis C B19.20    Tobacco dependence syndrome F17.200    Class 1 obesity due to excess calories with serious comorbidity and body mass index (BMI) of 30.0 to 30.9 in adult E66.09, Z68.30    Benign prostatic hyperplasia (BPH) with urinary urgency N40.1, R39.15    Bruising T14.8XXA    Atherosclerosis of native coronary artery of native heart I25.10    Alcoholic cirrhosis of liver without ascites (HCC) K70.30    Pain and swelling of lower extremity M79.606, M79.89    Chronic pain of both knees M25.561, M25.562, G89.29    Left hand pain M79.642    Chronic gout of left foot M1A.0720    Weight gain R63.5    Hyperglycemia R73.9    Anemia, unspecified D64.9    Gum symptoms R19.8    Sprain of temporomandibular joint or ligament S03.40XA    Lymphadenopathy R59.1    Tonsillar mass J35.8    Neck mass R22.1    Alcohol abuse, daily use F10.10    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    Gout M10.9    Hepatic cirrhosis (HCC) K74.60    Hyperlipidemia E78.5    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    Primary osteoarthritis of both knees M17.0    Paroxysmal atrial fibrillation (HCC) I48.0    Atrial fibrillation with rapid ventricular response (HCC) I48.91    Post-op pain G89.18    Secondary hypercoagulable state (HCC)  List:196666827}    Routes of Feeding: {CHP DME Other Feedings:379057735}  Liquids: {Slp liquid thickness:60303}  Daily Fluid Restriction: {CHP DME Yes amt example:271132384}  Last Modified Barium Swallow with Video (Video Swallowing Test): {Done Not Done Date:}    Treatments at the Time of Hospital Discharge:   Respiratory Treatments: ***  Oxygen Therapy:  {Therapy; copd oxygen:12017}  Ventilator:    {[facility] Vent List:140327553}    Rehab Therapies: {THERAPEUTIC INTERVENTION:5327084572}  Weight Bearing Status/Restrictions: { CC Weight Bearin}  Other Medical Equipment (for information only, NOT a DME order):  {EQUIPMENT:486385777}  Other Treatments: ***    Patient's personal belongings (please select all that are sent with patient):  {[facility] DME Belongings:089963532}    RN SIGNATURE:  {Esignature:252797488}    CASE MANAGEMENT/SOCIAL WORK SECTION    Inpatient Status Date: ***    Readmission Risk Assessment Score:  Readmission Risk              Risk of Unplanned Readmission:  22           Discharging to Facility/ Agency   Name:   Address:  Phone:  Fax:    Dialysis Facility (if applicable)   Name:  Address:  Dialysis Schedule:  Phone:  Fax:    / signature: {Esignature:912562716}    PHYSICIAN SECTION    Prognosis: Fair    Condition at Discharge: Stable    Rehab Potential (if transferring to Rehab): Fair    Recommended Labs or Other Treatments After Discharge:     Nutrition Support: Cyclic TF over 12 hours (8p-8a)  Formula: Two Rosendo  Rate: 100ml/h over 12 hours  Water bolus 250ml every 3h while TF running overnight    Physician Certification: I certify the above information and transfer of Arpit Vinson  is necessary for the continuing treatment of the diagnosis listed and that he requires Home Care for greater 30 days.     Update Admission H&P: Changes in H&P as follows - changes in medications  please make sure patent gets his PPI from pharmacy which he is to take twice

## 2024-01-26 NOTE — PROGRESS NOTES
Discharge note: Patient has been seen by doctor. Discharge order obtained, and discharge instructions reviewed. Patient educated, using the teach back method, about follow up instructions and discharge instructions. A completed copy of the AVS instructions given to patient and all questions answered. Printed prescriptions were given to pt. IV catheter removed without complaints, catheter intact, site WNL, L chest port deaccessed without any complications. Discharged to lobby via wheel chair per transportation.     Electronically signed by Ross Jimenes RN on 1/26/2024 at 6:33 PM

## 2024-01-26 NOTE — CARE COORDINATION
Case Management Assessment            Discharge Note                    Date / Time of Note: 1/26/2024 3:14 PM                  Discharge Note Completed by: Jeremy Ledesma RN    Patient Name: Arpit Vinson   YOB: 1950  Diagnosis: Neutropenic fever (HCC) [D70.9, R50.81]  Neutropenic sepsis (HCC) [A41.9, D70.9]   Date / Time: 1/23/2024 11:58 AM    Current PCP: Sissy Evangelista MD  Clinic patient: No    Hospitalization in the last 30 days: No       Advance Directives:  Code Status: Full Code  [place] DNR form completed and on chart: No, Not Indicated    Financial:  Payor: MEDICARE / Plan: MEDICARE PART A AND B / Product Type: *No Product type* /      Pharmacy:    [facility] [id number] [address] [phone] - F [phone] [address] [facility] [address]  Phone: [phone] Fax: [phone]    [id number] [place], [place] - [address] [facility] [phone] - F [phone]  [address] [place] [id number]  Phone: [phone] Fax: [phone]      Assistance purchasing medications?:    Assistance provided by Case Management: None at this time    Does patient want to participate in local refill/ meds to beds program?:      Meds To Beds General Rules:  1. Can ONLY be done Monday- Friday between 8:30am-5pm  2. Prescription(s) must be in pharmacy by 3pm to be filled same day  3.Copy of patient's insurance/ prescription drug card and patient face sheet must be sent along with the prescription(s)  4. Cost of Rx cannot be added to hospital bill. If financial assistance is needed, please contact unit  or ;  or  CANNOT provide pharmacy voucher for patients co-pays  5. Patients can then  the prescription on their way out of the hospital at discharge, or pharmacy can deliver to the bedside if staff is available. (payment due at time of pick-up or delivery - cash, check, or

## 2024-01-26 NOTE — CARE COORDINATION
Resumption of Care    Patient currently active with [facility] prior to admission. Orders faxed to [facility] for Resumption of Care by 1/28  Electronically signed by Yessenia Keys LPN on 1/26/2024 at 12:15 PM

## 2024-01-26 NOTE — PLAN OF CARE
Problem: Discharge Planning  Goal: Discharge to home or other facility with appropriate resources  1/26/2024 0007 by Anuradha Arce RN  Outcome: Progressing  Flowsheets (Taken 1/26/2024 0007)  Discharge to home or other facility with appropriate resources:   Identify barriers to discharge with patient and caregiver   Arrange for interpreters to assist at discharge as needed   Refer to discharge planning if patient needs post-hospital services based on physician order or complex needs related to functional status, cognitive ability or social support system   Identify discharge learning needs (meds, wound care, etc)     Problem: Pain  Goal: Verbalizes/displays adequate comfort level or baseline comfort level  1/26/2024 0007 by Anuradha Arce RN  Outcome: Progressing  Flowsheets (Taken 1/26/2024 0007)  Verbalizes/displays adequate comfort level or baseline comfort level:   Encourage patient to monitor pain and request assistance   Administer analgesics based on type and severity of pain and evaluate response   Consider cultural and social influences on pain and pain management   Assess pain using appropriate pain scale   Implement non-pharmacological measures as appropriate and evaluate response     Problem: Safety - Adult  Goal: Free from fall injury  1/26/2024 0007 by Anuradha Arce RN  Outcome: Progressing  Flowsheets (Taken 1/26/2024 0007)  Free From Fall Injury: Instruct family/caregiver on patient safety  Note: Fall protocol in place

## 2024-01-26 NOTE — PROGRESS NOTES
Clinical Pharmacy Progress Note    Vancomycin - Management by Pharmacy    Consult Date(s): 1/23/24  Consulting Provider(s): Dr. Santana    Assessment / Plan  Febrile neutropenia - Vancomycin  Concurrent Antimicrobials:    Cefepime - day #4  Day of Vanc Therapy / Ordered Duration: #4  Current Dosing Method: Bayesian-Guided AUC Dosing  Therapeutic Goal: -600 mg/L*hr  Current Dose / Plan:   SCr continues to improve, down to 0.9 from 1.4 on admission.  Baseline SCr 0.8-0.9 (Dec 2023)  On vancomycin 1000 IV q12h  Regimen predicts AUC of 436 mg/L*h, but with low probability of achieving/maintaning therapeutic AUC  Given further improvement in renal function - will increase dose to 1250mg IV q12h  Regimen predicts AUC of 534 mg/L*h, with higher predicted probablity of maintaining therapeutic targets per Bayesian modeling  Vancomycin level ordered for tomorrow AM, Sat 1/27 to confirm kinetic estimates  Will continue to monitor clinical condition and make adjustments to regimen as appropriate.    Please call with questions--  Maura Padgett, PharmD, [facility]  Wireless: [phone]   1/26/2024 8:56 AM      Interval update: Plan for MBS today. Remains afebrile.    Subjective/Objective:   Arpit Vinson is a 73 y.o. male with a PMHx significant for HTN, tonsil cancer (on chemotherapy/radiation) who presented to ED from infusion center with anemia, dizzness; found have neutropenic fever.  Admitted with sepsis, neutropenic fever, ROSANNA and concern for GI bleed.    Pharmacy is consulted to dose Vancomycin.    Ht Readings from Last 1 Encounters:   01/23/24 1.88 m (6' 2\")     Wt Readings from Last 1 Encounters:   01/26/24 102.3 kg (225 lb 8.5 oz)     Current & Prior Antimicrobial Regimen(s):  Cefepime (1/23-current)  Vancomycin - Pharmacy to dose  1000mg IV q12h (1/25-1/26)  1250mg IV q12h (1/26-current)    Vancomycin Level(s) / Doses:  Date Time Dose Type of Level / Level Interpretation   1/25 01:50 750mg IV q12h Random = 12.6  mg/L Drawn ~10h after prior dose  AUC = 363 mg/L*h  Increase dose to 1000mg IV q12h          Note: Serum levels collected for AUC-based dosing may be high if collected in close proximity to the dose administered. This is not necessarily indicative of toxicity.    Cultures & Sensitivities:    Date Site Micro Susceptibility / Result   1/23 Blood x 2 NGTD x 2     1/23 COVID-19 & Influenza Not detected     Urine      Sputum     1/23 Throat culture, fungal Moderate growth Candida albicans      Recent Labs     01/24/24  0310 01/25/24  0622 01/26/24  0455   CREATININE 1.2 1.0 0.9   BUN 21* 14 13   WBC 1.9* 1.9* 1.7*         Estimated Creatinine Clearance: 95 mL/min (based on SCr of 0.9 mg/dL).    Additional Lab Values / Findings of Note:    Recent Labs     01/23/24  1231   PROCAL 0.10

## 2024-01-26 NOTE — DISCHARGE SUMMARY
Hospital Medicine Discharge Summary    Patient: Arpit Vinson     Gender: male  : 1950   Age: 73 y.o.  MRN: [id number]    Admitting Physician: Orville Santana MD  Discharge Physician: Kaylin Adams MD     Code Status: Full Code     Admit Date: 2024   Discharge Date:   2024    Disposition:  Home    Discharge Diagnoses:    Active Hospital Problems    Diagnosis Date Noted    Iron deficiency anemia due to chronic blood loss [D50.0] 2024    Mucositis due to radiation therapy [K12.33] 2024    Neutropenic sepsis (HCC) [A41.9, D70.9] 2024       Follow-up appointments:  one week    Outpatient to do list: f/u with onc next tuesday    Condition at Discharge:  Stable    Hospital Course:   74 yo male admitted with neutropenia, fever, concern for speiss as he is on chemo.  Sepsis present on admission likely a result of aspiration pneumonia.  He was treated with antibiotics in the hospital and he improved.  Discussed case with dr. Ashby today; he had developed post op afib, for which he is on eliquis and asa along with amiodarone.  He has since cut down to one ounce drink daily.  I discussed risks of drinking at length; I am going to stop the eliquis since he did not have afib here and he has active ulcers and is drinking and is at very high risk for bleeding.  He understands and has communicated understanding.  Asked to monitor for palpitation, chest pain shortnes sof breath nausae vomiting fevers or chills and seek immediate medical attention or call 911.     Discharge Medications:   Current Discharge Medication List        START taking these medications    Details   lansoprazole 3 MG/ML SUSP Take 5 mLs by mouth 2 times daily (before meals)  Qty: 300 mL, Refills: 2      oxyCODONE (ROXICODONE INTENSOL) 100 MG/5ML concentrated solution 0.5 mLs by Per G Tube route every 4 hours as needed for Pain for up to 5 days. Max Daily Amount: 60 mg  Qty: 1 each, Refills: 0    Comments: Reduce  1.20 (H) 12/08/2023    INR 1.04 10/07/2021       Radiology:  EGD    Result Date: 1/24/2024  No dictation     XR CHEST (2 VW)    Result Date: 1/23/2024  EXAM: PA AND LATERAL CHEST X-RAY INDICATION: cough, COMPARISON: 12/7/2023 FINDINGS: Left subclavian PowerPort catheter tip in distal SVC. No focal consolidation. Evidence of remote granulomatous disease. No pleural effusion or pneumothorax. Cardiac silhouette is normal.     No acute radiographic abnormality.       EKG   The patient was seen and examined on day of discharge and this discharge summary is in conjunction with any daily progress note from day of discharge.Time Spent on discharge is 45 minutes  in the examination, evaluation, counseling and review of medications and discharge plan.      Note that more than 30 minutes was spent in preparing discharge papers, discussing discharge with patient, medication review, etc.       Signed:    Kaylin Adams MD   1/26/2024      Thank you Sissy Evangelista MD for the opportunity to be involved in this patient's care. If you have any questions or concerns please feel free to contact me

## 2024-01-26 NOTE — PROGRESS NOTES
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  He will need it for greater than 90 days.

## 2024-01-26 NOTE — PROGRESS NOTES
Comprehensive Nutrition Assessment    RECOMMENDATIONS:  PO Diet: NPO; ADAT  Nutrition Education: No recommendation at this time   Nutrition Support: Cyclic TF over 12 hours (8p-8a)  Formula: Two Rosendo  Rate: 100ml/h over 12 hours  Fluid: 250ml every 3h while TF running overnight      NUTRITION ASSESSMENT:   Nutritional summary & status: Follow-up.  Pt switched to continuous TF to increase tolerance.  Pt tolerated TF at goal rate w/ no complaints.  Pt s/p MBS today w/ recs for full liquids.  Pt endorses minimal PO intake, even w/ liquids, d/t \"coughing fit.\" RD providing recs to meet 100% of kcal, pro, and fluid needs.  Pt previously having issues w/ insurance covering TF pump- now approved for coverage.  TF recommendations for d/c above.  Spoke w/ pt- aware of recs.   Admission // PMH: Neutropenic sepsis//arthritis, HTN, impaired fasting glu, tonsillar mass    MALNUTRITION ASSESSMENT  Context of Malnutrition: Chronic Illness   Malnutrition Status: Severe malnutrition  Findings of the 6 clinical characteristics of malnutrition (Minimum of 2 out of 6 clinical characteristics is required to make the diagnosis of moderate or severe Protein Calorie Malnutrition based on AND/ASPEN Guidelines):  Energy Intake:  75% or less estimated energy requirements for 1 month or longer  Weight Loss:  Greater than 5% over 1 month     Body Fat Loss:  No significant body fat loss     Muscle Mass Loss:  No significant muscle mass loss    Fluid Accumulation:  No significant fluid accumulation     Strength:  Not Performed    NUTRITION DIAGNOSIS   Severe malnutrition related to acute injury/trauma, swallowing difficulty (chemordx tmt) as evidenced by Criteria as identified in malnutrition assessment    Nutrition Monitoring and Evaluation:   Food/Nutrient Intake Outcomes:  Enteral Nutrition Intake/Tolerance  Physical Signs/Symptoms Outcomes:  Biochemical Data, Chewing or Swallowing, Nutrition Focused Physical Findings, Weight

## 2024-01-26 NOTE — ONCOLOGY
[facility] RADIATION ONCOLOGY    [phone]    DATE: 1/26/2024    AREA TREATED: TONSIL    DAILY DOSE: 200 cGy    CUMULATIVE DOSE: 6000cGy    #  30  OUT OF 35  TREATMENTS    NEXT PLANNED TREATMENT  DATE: 1/29/2024 9:15AM    Daily Treatments are Monday through Friday:       INPATIENT CODING:  Beam Radiation  Photons /  Photon energy :6MV

## 2024-01-27 ENCOUNTER — TELEPHONE (OUTPATIENT)
Dept: INTERNAL MEDICINE CLINIC | Age: 74
End: 2024-01-27

## 2024-01-27 LAB — BACTERIA BLD CULT ORG #2: NORMAL

## 2024-01-27 RX ORDER — OXYCODONE HCL 5 MG/5 ML
5 SOLUTION, ORAL ORAL EVERY 4 HOURS PRN
Qty: 150 ML | Refills: 0 | Status: SHIPPED | OUTPATIENT
Start: 2024-01-27 | End: 2024-02-01

## 2024-01-28 LAB — BACTERIA BLD CULT: NORMAL

## 2024-01-29 ENCOUNTER — TELEPHONE (OUTPATIENT)
Dept: ONCOLOGY | Age: 74
End: 2024-01-29

## 2024-01-29 ENCOUNTER — CARE COORDINATION (OUTPATIENT)
Dept: CASE MANAGEMENT | Age: 74
End: 2024-01-29

## 2024-01-29 NOTE — CARE COORDINATION
Care Transitions Initial Follow Up Call    Call within 2 business days of discharge: Yes    Patient Current Location:  Home: [address] [place] [id number]    Care Transition Nurse contacted the patient by telephone to perform post hospital discharge assessment. Verified name and  with patient as identifiers. Provided introduction to self, and explanation of the Care Transition Nurse role.     Patient: Arpit Vinson Patient : 1950   MRN: [id number]   Reason for Admission: neutropenic fever - sepsis - EGD  Discharge Date: 24 RARS: Readmission Risk Score: 19.1      Last Discharge Facility       Date Complaint Diagnosis Description Type Department Provider    24 Low Hgb; Dizziness Neutropenic fever (HCC) ... ED to Hosp-Admission (Discharged) (ADMITTED) [facility] 4 PCU Kaylin Adams MD; [facility]..            Was this an external facility discharge? No Discharge Facility: [facility]      Challenges to be reviewed by the provider   Additional needs identified to be addressed with provider: Yes - hfu needed          Method of communication with provider: chart routing.    SUMMARY  CTN spoke to the pt who reports the following:      -C/O pain to right indx finger at 1st knuckle.  Rate pain 5 with increased pain with movement and dangling towards the ground.  Reports swelling and skin feeling hot to touch.  Does have history of gout and reports \"it feels like gout\".  Taking Allopurinol and will continue taking.  Will speak with [facility] doctor tomorrow and if they are not able to handle it he will f/u with PCP.    -C/O cough and congestion r/t radiation / chemo treatment.    -Denies issues with left chest port and g-tube.  Reviewing g-tube information from AVS at this time and denies any issues / concerns to address at this time.   -Denies fever, chills, nausea, vomiting, SOB / DB, wheezing, chest pain, LE edema / pain, feeling lightheaded / dizziness, heart palpitations / flutters,

## 2024-01-29 NOTE — TELEPHONE ENCOUNTER
Call to patient to reschedule weekly follow up post hospital d/c. Pt able to schedule with RD 1/31/24.     Electronically signed by Faith Malone RD, LD on 1/29/2024 at 2:36 PM

## 2024-01-31 ENCOUNTER — HOSPITAL ENCOUNTER (OUTPATIENT)
Dept: ONCOLOGY | Age: 74
Setting detail: INFUSION SERIES
Discharge: HOME | End: 2024-01-31

## 2024-01-31 VITALS — BODY MASS INDEX: 28.96 KG/M2 | HEIGHT: 74 IN

## 2024-01-31 NOTE — PROGRESS NOTES
Oncology Nutrition Note    RECOMMENDATIONS:  CYCLIC TF via PEG: TwoCal HN (or equivalent) @ goal rate of 100 ml/hr x 12 hours (6p-6a)  Water flushes: Increase to 200 ml syringe 4 times daily.   Pt stating does not have correct bags/tubing to schedule water flushes on pump; doing syringe flushes.   PO Diet: Full Liquid diet per MBS/SLP eval  1/26/24.   Could benefit from OP SLP.   Nutrition Education: Verbal and written feeding and flushing schedule provided.     Pt to see RD weekly - next appointment 2/9/24    Nutritional summary: Nutrition followup for TF tolerance/malnutrition while on XRT. Pt recently admitted- inpatient RD team were able to convert pt to pump feeds successfully and pt was able to obtain SLP eval for insurance coverage of his feeds. Noted MBS completed per IP SLP and rec'd Full Liquid diet currently. Pt is to be on overnight feeds of 2Cal @ 100 ml/hr x 12 hours to meet 100% of his needs. Pt states he is at 75 ml/hr and plans to increase to goal rate by this coming Friday. Pt denies s/s of TF intolerance as barrier to getting to goal. Also reports he is flushing 60 ml water every 4 hours via syringe. He is essentially taking nothing PO at this time. However, weight appears improved. Pt c/o mucous and altered taste. RD encouraged pt to get to goal rate; asked pt to increase water flushes. Reviewed nutrition expectations over the coming weeks. Pt to see RD in 1 week.     Oncology/PMH: Tonsil Ca dx 11/9/2023; Cisplatin + XRT Q7D start 12/12/23 for planned 7 cycles. S/p Port & PEG placement per Dr. Weaver 12/8/23 (PMH includes cirrhosis, HLD/HTN)    MALNUTRITION ASSESSMENT  Context of Malnutrition: Chronic Illness   Malnutrition Status: Severe malnutrition  Findings of the 6 clinical characteristics of malnutrition (Minimum of 2 out of 6 clinical characteristics is required to make the diagnosis of moderate or severe Protein Calorie Malnutrition based on AND/ASPEN Guidelines):  Energy Intake: Less

## 2024-02-05 LAB
FUNGUS BLD CULT: NORMAL
FUNGUS BLD CULT: NORMAL

## 2024-02-07 ENCOUNTER — OFFICE VISIT (OUTPATIENT)
Dept: INTERNAL MEDICINE CLINIC | Age: 74
End: 2024-02-07

## 2024-02-07 VITALS
WEIGHT: 227 LBS | SYSTOLIC BLOOD PRESSURE: 122 MMHG | HEIGHT: 74 IN | DIASTOLIC BLOOD PRESSURE: 84 MMHG | BODY MASS INDEX: 29.13 KG/M2 | TEMPERATURE: 98 F

## 2024-02-07 DIAGNOSIS — I48.0 PAROXYSMAL ATRIAL FIBRILLATION (HCC): ICD-10-CM

## 2024-02-07 DIAGNOSIS — I48.91 ATRIAL FIBRILLATION WITH RAPID VENTRICULAR RESPONSE (HCC): ICD-10-CM

## 2024-02-07 DIAGNOSIS — E43 SEVERE MALNUTRITION (HCC): Chronic | ICD-10-CM

## 2024-02-07 DIAGNOSIS — Z09 HOSPITAL DISCHARGE FOLLOW-UP: ICD-10-CM

## 2024-02-07 DIAGNOSIS — C09.9 MALIGNANT NEOPLASM OF TONSIL (HCC): ICD-10-CM

## 2024-02-07 DIAGNOSIS — C77.0 MALIGNANT NEOPLASM METASTATIC TO LYMPH NODE OF NECK (HCC): Primary | ICD-10-CM

## 2024-02-07 RX ORDER — OXYCODONE HCL 5 MG/5 ML
SOLUTION, ORAL ORAL EVERY 4 HOURS PRN
Status: ON HOLD | COMMUNITY

## 2024-02-07 ASSESSMENT — PATIENT HEALTH QUESTIONNAIRE - PHQ9
SUM OF ALL RESPONSES TO PHQ QUESTIONS 1-9: 0
SUM OF ALL RESPONSES TO PHQ QUESTIONS 1-9: 0
SUM OF ALL RESPONSES TO PHQ9 QUESTIONS 1 & 2: 0
1. LITTLE INTEREST OR PLEASURE IN DOING THINGS: 0
SUM OF ALL RESPONSES TO PHQ QUESTIONS 1-9: 0
2. FEELING DOWN, DEPRESSED OR HOPELESS: 0
SUM OF ALL RESPONSES TO PHQ QUESTIONS 1-9: 0

## 2024-02-07 NOTE — PROGRESS NOTES
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 If you have any questions, ask your nurse or doctor.                CONTINUE taking these medications      allopurinol 300 MG tablet  Commonly known as: ZYLOPRIM  Take 1 tablet by mouth daily     atenolol 50 MG tablet  Commonly known as: TENORMIN  Take 0.5 tablets by mouth daily     atorvastatin 40 MG tablet  Commonly known as: LIPITOR  TAKE 1 TABLET BY MOUTH DAILY     diclofenac sodium 1 % Gel  Commonly known as: VOLTAREN     furosemide 20 MG tablet  Commonly known as: LASIX  TAKE 1 TABLET BY MOUTH DAILY AS NEEDED FOR LEG SWELLING     Ginkgo Biloba 40 MG Tabs     lansoprazole 3 MG/ML Susp  Take 5 mLs by mouth 2 times daily (before meals)     Milk Thistle 500 MG Caps     oxyCODONE 5 MG/5ML solution  Commonly known as: ROXICODONE     spironolactone 25 MG tablet  Commonly known as: ALDACTONE  Take 1 tablet by mouth daily                Medications marked \"taking\" at this time  Outpatient Medications Marked as Taking for the 2/7/24 encounter (Office Visit) with Sissy Evangelista MD   Medication Sig Dispense Refill    oxyCODONE (ROXICODONE) 5 MG/5ML solution Take by mouth every 4 hours as needed for Pain.      atenolol (TENORMIN) 50 MG tablet Take 0.5 tablets by mouth daily 90 tablet 1    atorvastatin (LIPITOR) 40 MG tablet TAKE 1 TABLET BY MOUTH DAILY 90 tablet 1    furosemide (LASIX) 20 MG tablet TAKE 1 TABLET BY MOUTH DAILY AS NEEDED FOR LEG SWELLING 30 tablet 2    Ginkgo Biloba 40 MG TABS Take by mouth      Milk Thistle 500 MG CAPS Take by mouth      spironolactone (ALDACTONE) 25 MG tablet Take 1 tablet

## 2024-02-07 NOTE — ASSESSMENT & PLAN NOTE
-  12/2023, Was found to have new onset Afib related to post op started on Elqiuis Amio.   - 1/2024, He was seen by Cards and was recommended to continue Eliquis, Amio until recovered from radiation and surgical procedures to removal PEG tube.   - Continue to be in sinus  - After recent hospitalization 1/23-1/24, Eliquis and Amio was discontinued per hospitalist.  I will reach out to cards to get their recommendations. I will continue to hold off on these medications at this time.   Addendum: discussed with deanna Kaminski to hold Eliquis and Amio at this time.

## 2024-02-07 NOTE — ASSESSMENT & PLAN NOTE
Managed by Dr. Ashby, [facility]   Dx in 11/2023, s/p surgery, is getting radiation, chemo

## 2024-02-07 NOTE — ASSESSMENT & PLAN NOTE
Was found to have post op, new onset Afib started on Elqiuis Amio However back to sinus, Eliquis and Amio was d/c during hospitalization. Continue to be in sinus.   He has an appointment to see EP in a couple of weeks. Will continue to hold off on Amio and Eliquis.

## 2024-02-07 NOTE — ASSESSMENT & PLAN NOTE
Managed by Dr. Ashby, [facility]   Dx in 11/2023, stage I, s/p surgery, is getting radiation, chemo

## 2024-02-08 ENCOUNTER — HOSPITAL ENCOUNTER (INPATIENT)
Age: 74
LOS: 4 days | Discharge: HOME HEALTH | DRG: 177 | End: 2024-02-12
Attending: EMERGENCY MEDICINE | Admitting: INTERNAL MEDICINE
Payer: MEDICARE

## 2024-02-08 ENCOUNTER — APPOINTMENT (OUTPATIENT)
Dept: GENERAL RADIOLOGY | Age: 74
DRG: 177 | End: 2024-02-08
Payer: MEDICARE

## 2024-02-08 DIAGNOSIS — J18.9 PNEUMONIA OF RIGHT LOWER LOBE DUE TO INFECTIOUS ORGANISM: Primary | ICD-10-CM

## 2024-02-08 PROBLEM — D64.9 ANEMIA: Status: ACTIVE | Noted: 2024-02-08

## 2024-02-08 LAB
ALBUMIN SERPL-MCNC: 4.2 G/DL (ref 3.4–5)
ALP SERPL-CCNC: 155 U/L (ref 40–129)
ALT SERPL-CCNC: 34 U/L (ref 10–40)
ANION GAP SERPL CALCULATED.3IONS-SCNC: 16 MMOL/L (ref 3–16)
AST SERPL-CCNC: 42 U/L (ref 15–37)
BACTERIA URNS QL MICRO: ABNORMAL /HPF
BASE EXCESS BLDV CALC-SCNC: 0.6 MMOL/L (ref -2–3)
BASOPHILS # BLD: 0 K/UL (ref 0–0.2)
BASOPHILS NFR BLD: 0.2 %
BILIRUB DIRECT SERPL-MCNC: <0.2 MG/DL (ref 0–0.3)
BILIRUB INDIRECT SERPL-MCNC: ABNORMAL MG/DL (ref 0–1)
BILIRUB SERPL-MCNC: 0.5 MG/DL (ref 0–1)
BILIRUB UR QL STRIP.AUTO: NEGATIVE
BUN SERPL-MCNC: 45 MG/DL (ref 7–20)
CALCIUM SERPL-MCNC: 9.8 MG/DL (ref 8.3–10.6)
CHLORIDE SERPL-SCNC: 99 MMOL/L (ref 99–110)
CLARITY UR: CLEAR
CO2 BLDV-SCNC: 28 MMOL/L
CO2 SERPL-SCNC: 22 MMOL/L (ref 21–32)
COHGB MFR BLDV: 1.9 % (ref 0–1.5)
COLOR UR: YELLOW
CREAT SERPL-MCNC: 1.4 MG/DL (ref 0.8–1.3)
DEPRECATED RDW RBC AUTO: 20.3 % (ref 12.4–15.4)
DO-HGB MFR BLDV: 81.6 %
EKG ATRIAL RATE: 102 BPM
EKG DIAGNOSIS: NORMAL
EKG P AXIS: 78 DEGREES
EKG P-R INTERVAL: 158 MS
EKG Q-T INTERVAL: 348 MS
EKG QRS DURATION: 78 MS
EKG QTC CALCULATION (BAZETT): 453 MS
EKG R AXIS: 27 DEGREES
EKG T AXIS: 34 DEGREES
EKG VENTRICULAR RATE: 102 BPM
EOSINOPHIL # BLD: 0 K/UL (ref 0–0.6)
EOSINOPHIL NFR BLD: 0.4 %
EPI CELLS #/AREA URNS HPF: ABNORMAL /HPF (ref 0–5)
FLUAV RNA RESP QL NAA+PROBE: NOT DETECTED
FLUBV RNA RESP QL NAA+PROBE: NOT DETECTED
GFR SERPLBLD CREATININE-BSD FMLA CKD-EPI: 53 ML/MIN/{1.73_M2}
GLUCOSE SERPL-MCNC: 107 MG/DL (ref 70–99)
GLUCOSE UR STRIP.AUTO-MCNC: NEGATIVE MG/DL
HCO3 BLDV-SCNC: 26.2 MMOL/L (ref 24–28)
HCT VFR BLD AUTO: 27.2 % (ref 40.5–52.5)
HGB BLD-MCNC: 9.4 G/DL (ref 13.5–17.5)
HGB UR QL STRIP.AUTO: NEGATIVE
KETONES UR STRIP.AUTO-MCNC: NEGATIVE MG/DL
LACTATE BLDV-SCNC: 1.6 MMOL/L (ref 0.4–1.9)
LACTATE BLDV-SCNC: 2.1 MMOL/L (ref 0.4–1.9)
LEUKOCYTE ESTERASE UR QL STRIP.AUTO: NEGATIVE
LIPASE SERPL-CCNC: 42 U/L (ref 13–60)
LYMPHOCYTES # BLD: 0.4 K/UL (ref 1–5.1)
LYMPHOCYTES NFR BLD: 7.8 %
MCH RBC QN AUTO: 32.4 PG (ref 26–34)
MCHC RBC AUTO-ENTMCNC: 34.5 G/DL (ref 31–36)
MCV RBC AUTO: 93.7 FL (ref 80–100)
METHGB MFR BLDV: 1.5 % (ref 0–1.5)
MONOCYTES # BLD: 0.3 K/UL (ref 0–1.3)
MONOCYTES NFR BLD: 6.6 %
NEUTROPHILS # BLD: 4.3 K/UL (ref 1.7–7.7)
NEUTROPHILS NFR BLD: 85 %
NITRITE UR QL STRIP.AUTO: NEGATIVE
PCO2 BLDV: 47.6 MMHG (ref 41–51)
PH BLDV: 7.35 [PH] (ref 7.35–7.45)
PH UR STRIP.AUTO: 6 [PH] (ref 5–8)
PLATELET # BLD AUTO: 134 K/UL (ref 135–450)
PMV BLD AUTO: 7.6 FL (ref 5–10.5)
PO2 BLDV: <30 MMHG (ref 25–40)
POTASSIUM SERPL-SCNC: 4.3 MMOL/L (ref 3.5–5.1)
PROCALCITONIN SERPL IA-MCNC: 0.17 NG/ML (ref 0–0.15)
PROT SERPL-MCNC: 8.2 G/DL (ref 6.4–8.2)
PROT UR STRIP.AUTO-MCNC: ABNORMAL MG/DL
RBC # BLD AUTO: 2.9 M/UL (ref 4.2–5.9)
RBC #/AREA URNS HPF: ABNORMAL /HPF (ref 0–4)
SAO2 % BLDV: 16 %
SARS-COV-2 RNA RESP QL NAA+PROBE: NOT DETECTED
SODIUM SERPL-SCNC: 137 MMOL/L (ref 136–145)
SP GR UR STRIP.AUTO: 1.02 (ref 1–1.03)
TROPONIN, HIGH SENSITIVITY: 11 NG/L (ref 0–22)
UA COMPLETE W REFLEX CULTURE PNL UR: ABNORMAL
UA DIPSTICK W REFLEX MICRO PNL UR: YES
URN SPEC COLLECT METH UR: ABNORMAL
UROBILINOGEN UR STRIP-ACNC: 0.2 E.U./DL
WBC # BLD AUTO: 5 K/UL (ref 4–11)
WBC #/AREA URNS HPF: ABNORMAL /HPF (ref 0–5)

## 2024-02-08 PROCEDURE — 84484 ASSAY OF TROPONIN QUANT: CPT

## 2024-02-08 PROCEDURE — 80076 HEPATIC FUNCTION PANEL: CPT

## 2024-02-08 PROCEDURE — 1200000000 HC SEMI PRIVATE

## 2024-02-08 PROCEDURE — 80048 BASIC METABOLIC PNL TOTAL CA: CPT

## 2024-02-08 PROCEDURE — 87636 SARSCOV2 & INF A&B AMP PRB: CPT

## 2024-02-08 PROCEDURE — 83690 ASSAY OF LIPASE: CPT

## 2024-02-08 PROCEDURE — 99285 EMERGENCY DEPT VISIT HI MDM: CPT

## 2024-02-08 PROCEDURE — 85025 COMPLETE CBC W/AUTO DIFF WBC: CPT

## 2024-02-08 PROCEDURE — 36415 COLL VENOUS BLD VENIPUNCTURE: CPT

## 2024-02-08 PROCEDURE — 6360000002 HC RX W HCPCS: Performed by: EMERGENCY MEDICINE

## 2024-02-08 PROCEDURE — 87040 BLOOD CULTURE FOR BACTERIA: CPT

## 2024-02-08 PROCEDURE — 81001 URINALYSIS AUTO W/SCOPE: CPT

## 2024-02-08 PROCEDURE — 2580000003 HC RX 258: Performed by: EMERGENCY MEDICINE

## 2024-02-08 PROCEDURE — 82803 BLOOD GASES ANY COMBINATION: CPT

## 2024-02-08 PROCEDURE — 83605 ASSAY OF LACTIC ACID: CPT

## 2024-02-08 PROCEDURE — 84145 PROCALCITONIN (PCT): CPT

## 2024-02-08 PROCEDURE — 71045 X-RAY EXAM CHEST 1 VIEW: CPT

## 2024-02-08 PROCEDURE — 96360 HYDRATION IV INFUSION INIT: CPT

## 2024-02-08 PROCEDURE — 93005 ELECTROCARDIOGRAM TRACING: CPT | Performed by: EMERGENCY MEDICINE

## 2024-02-08 RX ORDER — SODIUM CHLORIDE, SODIUM LACTATE, POTASSIUM CHLORIDE, AND CALCIUM CHLORIDE .6; .31; .03; .02 G/100ML; G/100ML; G/100ML; G/100ML
1000 INJECTION, SOLUTION INTRAVENOUS ONCE
Status: COMPLETED | OUTPATIENT
Start: 2024-02-08 | End: 2024-02-08

## 2024-02-08 RX ORDER — ATORVASTATIN CALCIUM 40 MG/1
40 TABLET, FILM COATED ORAL DAILY
Status: DISCONTINUED | OUTPATIENT
Start: 2024-02-09 | End: 2024-02-12 | Stop reason: HOSPADM

## 2024-02-08 RX ORDER — ONDANSETRON 4 MG/1
4 TABLET, ORALLY DISINTEGRATING ORAL EVERY 8 HOURS PRN
Status: DISCONTINUED | OUTPATIENT
Start: 2024-02-08 | End: 2024-02-12 | Stop reason: HOSPADM

## 2024-02-08 RX ORDER — ONDANSETRON 2 MG/ML
4 INJECTION INTRAMUSCULAR; INTRAVENOUS EVERY 6 HOURS PRN
Status: DISCONTINUED | OUTPATIENT
Start: 2024-02-08 | End: 2024-02-12 | Stop reason: HOSPADM

## 2024-02-08 RX ORDER — POLYETHYLENE GLYCOL 3350 17 G/17G
17 POWDER, FOR SOLUTION ORAL DAILY PRN
Status: DISCONTINUED | OUTPATIENT
Start: 2024-02-08 | End: 2024-02-12 | Stop reason: HOSPADM

## 2024-02-08 RX ORDER — ENOXAPARIN SODIUM 100 MG/ML
30 INJECTION SUBCUTANEOUS 2 TIMES DAILY
Status: DISCONTINUED | OUTPATIENT
Start: 2024-02-08 | End: 2024-02-09

## 2024-02-08 RX ORDER — SODIUM CHLORIDE 0.9 % (FLUSH) 0.9 %
5-40 SYRINGE (ML) INJECTION PRN
Status: DISCONTINUED | OUTPATIENT
Start: 2024-02-08 | End: 2024-02-12 | Stop reason: HOSPADM

## 2024-02-08 RX ORDER — SPIRONOLACTONE 25 MG/1
25 TABLET ORAL DAILY
Status: DISCONTINUED | OUTPATIENT
Start: 2024-02-09 | End: 2024-02-10

## 2024-02-08 RX ORDER — ACETAMINOPHEN 650 MG/1
650 SUPPOSITORY RECTAL EVERY 6 HOURS PRN
Status: DISCONTINUED | OUTPATIENT
Start: 2024-02-08 | End: 2024-02-12 | Stop reason: HOSPADM

## 2024-02-08 RX ORDER — POTASSIUM CHLORIDE 7.45 MG/ML
10 INJECTION INTRAVENOUS PRN
Status: DISCONTINUED | OUTPATIENT
Start: 2024-02-08 | End: 2024-02-12 | Stop reason: HOSPADM

## 2024-02-08 RX ORDER — POTASSIUM CHLORIDE 20 MEQ/1
40 TABLET, EXTENDED RELEASE ORAL PRN
Status: DISCONTINUED | OUTPATIENT
Start: 2024-02-08 | End: 2024-02-12 | Stop reason: HOSPADM

## 2024-02-08 RX ORDER — MAGNESIUM SULFATE IN WATER 40 MG/ML
2000 INJECTION, SOLUTION INTRAVENOUS PRN
Status: DISCONTINUED | OUTPATIENT
Start: 2024-02-08 | End: 2024-02-12 | Stop reason: HOSPADM

## 2024-02-08 RX ORDER — SODIUM CHLORIDE 0.9 % (FLUSH) 0.9 %
5-40 SYRINGE (ML) INJECTION EVERY 12 HOURS SCHEDULED
Status: DISCONTINUED | OUTPATIENT
Start: 2024-02-08 | End: 2024-02-12 | Stop reason: HOSPADM

## 2024-02-08 RX ORDER — SODIUM CHLORIDE 9 MG/ML
INJECTION, SOLUTION INTRAVENOUS PRN
Status: DISCONTINUED | OUTPATIENT
Start: 2024-02-08 | End: 2024-02-12 | Stop reason: HOSPADM

## 2024-02-08 RX ORDER — ALLOPURINOL 100 MG/1
300 TABLET ORAL DAILY
Status: DISCONTINUED | OUTPATIENT
Start: 2024-02-09 | End: 2024-02-12 | Stop reason: HOSPADM

## 2024-02-08 RX ORDER — ACETAMINOPHEN 325 MG/1
650 TABLET ORAL EVERY 6 HOURS PRN
Status: DISCONTINUED | OUTPATIENT
Start: 2024-02-08 | End: 2024-02-12 | Stop reason: HOSPADM

## 2024-02-08 RX ADMIN — CEFEPIME 2000 MG: 2 INJECTION, POWDER, FOR SOLUTION INTRAVENOUS at 21:34

## 2024-02-08 RX ADMIN — SODIUM CHLORIDE, POTASSIUM CHLORIDE, SODIUM LACTATE AND CALCIUM CHLORIDE 1000 ML: 600; 310; 30; 20 INJECTION, SOLUTION INTRAVENOUS at 16:47

## 2024-02-08 NOTE — ED NOTES
Pt placed on 2L O2 via NC for comfort and O2 saturation decreasing to 91% on RA.     Filippo Moore RN  02/08/24 [id number]

## 2024-02-08 NOTE — ED PROVIDER NOTES
THE [facility]  EMERGENCY DEPARTMENT ENCOUNTER          ATTENDING PHYSICIAN NOTE       Date of evaluation: 2/8/2024    Chief Complaint     Shortness of Breath ([facility] patient, sob worsening over last hour)      History of Present Illness     Arpit Vinson is a 73 y.o. male who presents to the emergency department with dyspnea and rigors that started earlier this afternoon.  He has history of tonsil cancer followed by [facility] with Dr. Ashby, is status post resection and currently receiving chemotherapy and radiation (last treatment was 2 days ago).  He denies chest pain, cough, fevers.  Says he currently feels better than earlier before he came to the hospital.    Has additional past medical history of paroxysmal atrial fibrillation and was previously on apixaban and amiodarone but this was discontinued during his most recent hospitalization at the end of January.    ASSESSMENT / PLAN  (MEDICAL DECISION MAKING)     INITIAL VITALS: BP: (!) 155/84, Temp: 99.1 °F (37.3 °C), Pulse: (!) 110, Respirations: 22, SpO2: 94 %      Arpit Vinson is a 73 y.o. male with past ministry of tonsil cancer currently undergoing chemotherapy and radiation who presented with an episode of shortness of breath and rigors.    On my initial encounter with the patient he was afebrile, slightly tachycardic into the 110s but normotensive, appeared to be in no acute respiratory distress on room air.  His heart and lungs were clear to auscultation, abdomen was soft and nontender with PEG tube in situ with no external evidence of any complications.    Workup notable for white blood cell count normal at 5.0, hemoglobin 9.4 from 7.3, platelets 134 from 83.  Lactate was 2.1 and procalcitonin was 0.17.  High sensitive troponin was 11.  Chest x-ray showed a right lower lobe pneumonia.    Due to the patient's immunocompromised status and recent hospitalization I ordered antibiotics to cover for healthcare associated pneumonia.  I discussed the

## 2024-02-09 LAB
ABO + RH BLD: NORMAL
ALBUMIN SERPL-MCNC: 3 G/DL (ref 3.4–5)
ALP SERPL-CCNC: 110 U/L (ref 40–129)
ALT SERPL-CCNC: 22 U/L (ref 10–40)
ANION GAP SERPL CALCULATED.3IONS-SCNC: 9 MMOL/L (ref 3–16)
AST SERPL-CCNC: 28 U/L (ref 15–37)
BILIRUB DIRECT SERPL-MCNC: <0.2 MG/DL (ref 0–0.3)
BILIRUB INDIRECT SERPL-MCNC: ABNORMAL MG/DL (ref 0–1)
BILIRUB SERPL-MCNC: 0.5 MG/DL (ref 0–1)
BLD GP AB SCN SERPL QL: NORMAL
BLOOD BANK DISPENSE STATUS: NORMAL
BLOOD BANK PRODUCT CODE: NORMAL
BPU ID: NORMAL
BUN SERPL-MCNC: 50 MG/DL (ref 7–20)
CALCIUM SERPL-MCNC: 8.7 MG/DL (ref 8.3–10.6)
CHLORIDE SERPL-SCNC: 101 MMOL/L (ref 99–110)
CO2 SERPL-SCNC: 23 MMOL/L (ref 21–32)
CREAT SERPL-MCNC: 1.5 MG/DL (ref 0.8–1.3)
DEPRECATED RDW RBC AUTO: 20.1 % (ref 12.4–15.4)
DESCRIPTION BLOOD BANK: NORMAL
GFR SERPLBLD CREATININE-BSD FMLA CKD-EPI: 49 ML/MIN/{1.73_M2}
GLUCOSE SERPL-MCNC: 92 MG/DL (ref 70–99)
HCT VFR BLD AUTO: 18.4 % (ref 40.5–52.5)
HCT VFR BLD AUTO: 20.2 % (ref 40.5–52.5)
HGB BLD-MCNC: 6.5 G/DL (ref 13.5–17.5)
HGB BLD-MCNC: 7.3 G/DL (ref 13.5–17.5)
MAGNESIUM SERPL-MCNC: 1.4 MG/DL (ref 1.8–2.4)
MCH RBC QN AUTO: 32.5 PG (ref 26–34)
MCHC RBC AUTO-ENTMCNC: 35.5 G/DL (ref 31–36)
MCV RBC AUTO: 91.3 FL (ref 80–100)
PHOSPHATE SERPL-MCNC: 4.5 MG/DL (ref 2.5–4.9)
PLATELET # BLD AUTO: 93 K/UL (ref 135–450)
PMV BLD AUTO: 7.5 FL (ref 5–10.5)
POTASSIUM SERPL-SCNC: 4.4 MMOL/L (ref 3.5–5.1)
PROT SERPL-MCNC: 6 G/DL (ref 6.4–8.2)
RBC # BLD AUTO: 2.01 M/UL (ref 4.2–5.9)
SODIUM SERPL-SCNC: 133 MMOL/L (ref 136–145)
TRIGL SERPL-MCNC: 71 MG/DL (ref 0–150)
VANCOMYCIN SERPL-MCNC: 16.3 UG/ML
WBC # BLD AUTO: 4.7 K/UL (ref 4–11)

## 2024-02-09 PROCEDURE — 2580000003 HC RX 258: Performed by: INTERNAL MEDICINE

## 2024-02-09 PROCEDURE — 85018 HEMOGLOBIN: CPT

## 2024-02-09 PROCEDURE — 86850 RBC ANTIBODY SCREEN: CPT

## 2024-02-09 PROCEDURE — 1200000000 HC SEMI PRIVATE

## 2024-02-09 PROCEDURE — 36415 COLL VENOUS BLD VENIPUNCTURE: CPT

## 2024-02-09 PROCEDURE — 6360000002 HC RX W HCPCS: Performed by: EMERGENCY MEDICINE

## 2024-02-09 PROCEDURE — 85027 COMPLETE CBC AUTOMATED: CPT

## 2024-02-09 PROCEDURE — 6360000002 HC RX W HCPCS: Performed by: INTERNAL MEDICINE

## 2024-02-09 PROCEDURE — 83735 ASSAY OF MAGNESIUM: CPT

## 2024-02-09 PROCEDURE — 6370000000 HC RX 637 (ALT 250 FOR IP): Performed by: INTERNAL MEDICINE

## 2024-02-09 PROCEDURE — 85014 HEMATOCRIT: CPT

## 2024-02-09 PROCEDURE — 80048 BASIC METABOLIC PNL TOTAL CA: CPT

## 2024-02-09 PROCEDURE — 86923 COMPATIBILITY TEST ELECTRIC: CPT

## 2024-02-09 PROCEDURE — P9016 RBC LEUKOCYTES REDUCED: HCPCS

## 2024-02-09 PROCEDURE — 86901 BLOOD TYPING SEROLOGIC RH(D): CPT

## 2024-02-09 PROCEDURE — 84478 ASSAY OF TRIGLYCERIDES: CPT

## 2024-02-09 PROCEDURE — 30233N1 TRANSFUSION OF NONAUTOLOGOUS RED BLOOD CELLS INTO PERIPHERAL VEIN, PERCUTANEOUS APPROACH: ICD-10-PCS | Performed by: INTERNAL MEDICINE

## 2024-02-09 PROCEDURE — 36430 TRANSFUSION BLD/BLD COMPNT: CPT

## 2024-02-09 PROCEDURE — 80076 HEPATIC FUNCTION PANEL: CPT

## 2024-02-09 PROCEDURE — 86900 BLOOD TYPING SEROLOGIC ABO: CPT

## 2024-02-09 PROCEDURE — 92610 EVALUATE SWALLOWING FUNCTION: CPT

## 2024-02-09 PROCEDURE — 80202 ASSAY OF VANCOMYCIN: CPT

## 2024-02-09 PROCEDURE — 2580000003 HC RX 258: Performed by: EMERGENCY MEDICINE

## 2024-02-09 PROCEDURE — 84100 ASSAY OF PHOSPHORUS: CPT

## 2024-02-09 RX ORDER — SODIUM CHLORIDE 0.9 % (FLUSH) 0.9 %
5-40 SYRINGE (ML) INJECTION EVERY 12 HOURS SCHEDULED
Status: DISCONTINUED | OUTPATIENT
Start: 2024-02-09 | End: 2024-02-12 | Stop reason: HOSPADM

## 2024-02-09 RX ORDER — SODIUM CHLORIDE 9 MG/ML
INJECTION, SOLUTION INTRAVENOUS CONTINUOUS
Status: ACTIVE | OUTPATIENT
Start: 2024-02-09 | End: 2024-02-10

## 2024-02-09 RX ORDER — ENOXAPARIN SODIUM 100 MG/ML
40 INJECTION SUBCUTANEOUS DAILY
Status: DISCONTINUED | OUTPATIENT
Start: 2024-02-10 | End: 2024-02-12 | Stop reason: HOSPADM

## 2024-02-09 RX ORDER — SODIUM CHLORIDE 0.9 % (FLUSH) 0.9 %
5-40 SYRINGE (ML) INJECTION PRN
Status: DISCONTINUED | OUTPATIENT
Start: 2024-02-09 | End: 2024-02-12 | Stop reason: HOSPADM

## 2024-02-09 RX ORDER — MAGNESIUM SULFATE IN WATER 40 MG/ML
4000 INJECTION, SOLUTION INTRAVENOUS ONCE
Status: COMPLETED | OUTPATIENT
Start: 2024-02-09 | End: 2024-02-09

## 2024-02-09 RX ORDER — SODIUM CHLORIDE 9 MG/ML
INJECTION, SOLUTION INTRAVENOUS PRN
Status: DISCONTINUED | OUTPATIENT
Start: 2024-02-09 | End: 2024-02-10

## 2024-02-09 RX ORDER — SODIUM CHLORIDE 9 MG/ML
INJECTION, SOLUTION INTRAVENOUS PRN
Status: DISCONTINUED | OUTPATIENT
Start: 2024-02-09 | End: 2024-02-12 | Stop reason: HOSPADM

## 2024-02-09 RX ORDER — GAUZE BANDAGE 2" X 2"
100 BANDAGE TOPICAL DAILY
Status: DISCONTINUED | OUTPATIENT
Start: 2024-02-09 | End: 2024-02-12 | Stop reason: HOSPADM

## 2024-02-09 RX ADMIN — SODIUM CHLORIDE, PRESERVATIVE FREE 10 ML: 5 INJECTION INTRAVENOUS at 21:23

## 2024-02-09 RX ADMIN — CEFEPIME 2000 MG: 2 INJECTION, POWDER, FOR SOLUTION INTRAVENOUS at 13:35

## 2024-02-09 RX ADMIN — Medication 100 MG: at 18:12

## 2024-02-09 RX ADMIN — CEFEPIME 2000 MG: 2 INJECTION, POWDER, FOR SOLUTION INTRAVENOUS at 05:33

## 2024-02-09 RX ADMIN — SODIUM CHLORIDE, PRESERVATIVE FREE 10 ML: 5 INJECTION INTRAVENOUS at 10:23

## 2024-02-09 RX ADMIN — VANCOMYCIN HYDROCHLORIDE 2500 MG: 1 INJECTION, POWDER, LYOPHILIZED, FOR SOLUTION INTRAVENOUS at 01:08

## 2024-02-09 RX ADMIN — SODIUM CHLORIDE: 9 INJECTION, SOLUTION INTRAVENOUS at 01:07

## 2024-02-09 RX ADMIN — MAGNESIUM SULFATE HEPTAHYDRATE 4000 MG: 40 INJECTION, SOLUTION INTRAVENOUS at 18:22

## 2024-02-09 RX ADMIN — LANSOPRAZOLE 15 MG: 15 TABLET, ORALLY DISINTEGRATING, DELAYED RELEASE ORAL at 10:20

## 2024-02-09 RX ADMIN — SODIUM CHLORIDE: 9 INJECTION, SOLUTION INTRAVENOUS at 18:14

## 2024-02-09 RX ADMIN — ENOXAPARIN SODIUM 30 MG: 100 INJECTION SUBCUTANEOUS at 10:23

## 2024-02-09 RX ADMIN — ALLOPURINOL 300 MG: 100 TABLET ORAL at 10:20

## 2024-02-09 RX ADMIN — SPIRONOLACTONE 25 MG: 25 TABLET ORAL at 10:20

## 2024-02-09 RX ADMIN — ATORVASTATIN CALCIUM 40 MG: 40 TABLET, FILM COATED ORAL at 10:20

## 2024-02-09 RX ADMIN — LANSOPRAZOLE 15 MG: 15 TABLET, ORALLY DISINTEGRATING, DELAYED RELEASE ORAL at 18:12

## 2024-02-09 RX ADMIN — VANCOMYCIN HYDROCHLORIDE 1500 MG: 10 INJECTION, POWDER, LYOPHILIZED, FOR SOLUTION INTRAVENOUS at 21:15

## 2024-02-09 RX ADMIN — ENOXAPARIN SODIUM 30 MG: 100 INJECTION SUBCUTANEOUS at 01:14

## 2024-02-09 NOTE — H&P
V2.0  History and Physical      Name:  Arpit Vinson /Age/Sex: 1950  (73 y.o. male)   MRN & CSN:  [id number] & [id number] Encounter Date/Time: 2024 8:58 PM EST   [facility] PCP: Sissy Evangelista MD       Hospital Day: 1    Assessment and Plan:   Arpit Vinson is a 73 y.o. male with a pmh of tonsillar cancer, hyperlipidemia, gastroesophageal reflux disease who presents with Anemia    Hospital Problems             Last Modified POA    * (Principal) Anemia 2024 Yes    Healthcare-associated pneumonia 2024 Yes   Acute respiratory failure  Tonsillar lung cancer currently undergoing chemotherapy  Hyperlipidemia  Gastroesophageal reflux disease    Plan:  Admit to medicine service  Will continue with cefepime and vancomycin.  Vancomycin will be dosed as per pharmacy patient did receive a dose in the ED  Cultures are pending and this will dictate antibiotic regimen once that data is available  Patient has a PEG tube and uses to call this will be resumed he also is allowed sips of water with medications and in between  Resume home medications which includes allopurinol atorvastatin and spironolactone, ginkgo Biloba  Lovenox for DVT prophylaxis  Wean FiO2 as tolerated goal is to keep sats greater than 89%  Disposition:   Current Living situation: Lives in the community with his wife  Expected Disposition: Home  Estimated D/C: 2 days     DVT Prophylaxis [x] Lovenox, []  Heparin, [] SCDs, [] Ambulation,  [] Eliquis, [] Xarelto, [] Coumadin   Code Status Full Code   Surrogate Decision Maker/ POA Wife Ms. Yuki Vinson     Personally reviewed Lab Studies and Imaging     Discussed management of the case with patient and addressed any concerns he had.      patient    History of Present Illness:     Chief Complaint: Shortness of breath  Arpit Vinson is a 73 y.o. male with pmh of tonsillar cancer currently undergoing chemotherapy,last chemotherapy was on Tuesday, ,

## 2024-02-09 NOTE — ED NOTES
ED TO INPATIENT SBAR HANDOFF    Patient Name: Arpit Vinson   :  1950  73 y.o.   MRN:  [id number]  Preferred Name  Arpit  ED Room #:  A07/A07-07  Family/Caregiver Present no   Restraints no   Sitter no   Sepsis Risk Score Sepsis Risk Score: 2.4    Situation  Code Status: Full Code .    Allergies: Patient has no known allergies.  Weight: Patient Vitals for the past 96 hrs (Last 3 readings):   Weight   24 1613 103 kg (227 lb)     Arrived from: home  Chief Complaint:   Chief Complaint   Patient presents with    Shortness of Breath     [facility] patient, sob worsening over last hour     Hospital Problem/Diagnosis:  Principal Problem:    Anemia  Active Problems:    Healthcare-associated pneumonia  Resolved Problems:    * No resolved hospital problems. *    Imaging:   XR CHEST PORTABLE   Final Result      Patchy opacities in the right lower lung compatible with pneumonia.      Electronically signed by Ross Quiroga MD        Abnormal labs:   Abnormal Labs Reviewed   CBC WITH AUTO DIFFERENTIAL - Abnormal; Notable for the following components:       Result Value    RBC 2.90 (*)     Hemoglobin 9.4 (*)     Hematocrit 27.2 (*)     RDW 20.3 (*)     Platelets 134 (*)     Lymphocytes Absolute 0.4 (*)     All other components within normal limits   BASIC METABOLIC PANEL W/ REFLEX TO MG FOR LOW K - Abnormal; Notable for the following components:    Glucose 107 (*)     BUN 45 (*)     Creatinine 1.4 (*)     Est, Glom Filt Rate 53 (*)     All other components within normal limits   HEPATIC FUNCTION PANEL - Abnormal; Notable for the following components:    Alkaline Phosphatase 155 (*)     AST 42 (*)     All other components within normal limits   LACTATE, SEPSIS - Abnormal; Notable for the following components:    Lactic Acid, Sepsis 2.1 (*)     All other components within normal limits   BLOOD GAS, VENOUS - Abnormal; Notable for the following components:    pH, Dez 7.348 (*)     Carboxyhemoglobin 1.9 (*)     All other

## 2024-02-09 NOTE — PLAN OF CARE
Completed clinical swallow evaluation. Please see report in EMR for details.     Hanna Tran M.A., CCC-SLP [phone]  Speech-Language Pathologist

## 2024-02-10 LAB
ALBUMIN SERPL-MCNC: 3.3 G/DL (ref 3.4–5)
ALP SERPL-CCNC: 103 U/L (ref 40–129)
ALT SERPL-CCNC: 20 U/L (ref 10–40)
ANION GAP SERPL CALCULATED.3IONS-SCNC: 13 MMOL/L (ref 3–16)
AST SERPL-CCNC: 24 U/L (ref 15–37)
BASOPHILS # BLD: 0 K/UL (ref 0–0.2)
BASOPHILS NFR BLD: 0.7 %
BILIRUB DIRECT SERPL-MCNC: <0.2 MG/DL (ref 0–0.3)
BILIRUB INDIRECT SERPL-MCNC: ABNORMAL MG/DL (ref 0–1)
BILIRUB SERPL-MCNC: 0.4 MG/DL (ref 0–1)
BUN SERPL-MCNC: 47 MG/DL (ref 7–20)
CALCIUM SERPL-MCNC: 8.9 MG/DL (ref 8.3–10.6)
CHLORIDE SERPL-SCNC: 99 MMOL/L (ref 99–110)
CO2 SERPL-SCNC: 22 MMOL/L (ref 21–32)
CREAT SERPL-MCNC: 1.6 MG/DL (ref 0.8–1.3)
DEPRECATED RDW RBC AUTO: 19.3 % (ref 12.4–15.4)
EOSINOPHIL # BLD: 0 K/UL (ref 0–0.6)
EOSINOPHIL NFR BLD: 0.8 %
GFR SERPLBLD CREATININE-BSD FMLA CKD-EPI: 45 ML/MIN/{1.73_M2}
GLUCOSE SERPL-MCNC: 150 MG/DL (ref 70–99)
HCT VFR BLD AUTO: 21 % (ref 40.5–52.5)
HGB BLD-MCNC: 7.4 G/DL (ref 13.5–17.5)
LYMPHOCYTES # BLD: 0.3 K/UL (ref 1–5.1)
LYMPHOCYTES NFR BLD: 12.3 %
MAGNESIUM SERPL-MCNC: 2.1 MG/DL (ref 1.8–2.4)
MCHC RBC AUTO-ENTMCNC: 35.4 G/DL (ref 31–36)
MCV RBC AUTO: 93.7 FL (ref 80–100)
MONOCYTES # BLD: 0.2 K/UL (ref 0–1.3)
MONOCYTES NFR BLD: 7.9 %
NEUTROPHILS # BLD: 2.2 K/UL (ref 1.7–7.7)
NEUTROPHILS NFR BLD: 78.3 %
PHOSPHATE SERPL-MCNC: 4.8 MG/DL (ref 2.5–4.9)
PLATELET # BLD AUTO: 93 K/UL (ref 135–450)
PMV BLD AUTO: 7.6 FL (ref 5–10.5)
POTASSIUM SERPL-SCNC: 4.1 MMOL/L (ref 3.5–5.1)
PROT SERPL-MCNC: 6.3 G/DL (ref 6.4–8.2)
RBC # BLD AUTO: 2.24 M/UL (ref 4.2–5.9)
SODIUM SERPL-SCNC: 134 MMOL/L (ref 136–145)
VANCOMYCIN SERPL-MCNC: 24 UG/ML
WBC # BLD AUTO: 2.8 K/UL (ref 4–11)

## 2024-02-10 PROCEDURE — 83735 ASSAY OF MAGNESIUM: CPT

## 2024-02-10 PROCEDURE — 80048 BASIC METABOLIC PNL TOTAL CA: CPT

## 2024-02-10 PROCEDURE — 6370000000 HC RX 637 (ALT 250 FOR IP): Performed by: INTERNAL MEDICINE

## 2024-02-10 PROCEDURE — 84100 ASSAY OF PHOSPHORUS: CPT

## 2024-02-10 PROCEDURE — 2580000003 HC RX 258: Performed by: INTERNAL MEDICINE

## 2024-02-10 PROCEDURE — 85025 COMPLETE CBC W/AUTO DIFF WBC: CPT

## 2024-02-10 PROCEDURE — 1200000000 HC SEMI PRIVATE

## 2024-02-10 PROCEDURE — 80202 ASSAY OF VANCOMYCIN: CPT

## 2024-02-10 PROCEDURE — 87641 MR-STAPH DNA AMP PROBE: CPT

## 2024-02-10 PROCEDURE — 80076 HEPATIC FUNCTION PANEL: CPT

## 2024-02-10 PROCEDURE — 6360000002 HC RX W HCPCS: Performed by: INTERNAL MEDICINE

## 2024-02-10 PROCEDURE — 36415 COLL VENOUS BLD VENIPUNCTURE: CPT

## 2024-02-10 RX ORDER — 0.9 % SODIUM CHLORIDE 0.9 %
1000 INTRAVENOUS SOLUTION INTRAVENOUS ONCE
Status: COMPLETED | OUTPATIENT
Start: 2024-02-10 | End: 2024-02-10

## 2024-02-10 RX ORDER — SODIUM CHLORIDE 9 MG/ML
INJECTION, SOLUTION INTRAVENOUS CONTINUOUS
Status: DISCONTINUED | OUTPATIENT
Start: 2024-02-10 | End: 2024-02-12

## 2024-02-10 RX ADMIN — SODIUM CHLORIDE 1000 ML: 9 INJECTION, SOLUTION INTRAVENOUS at 15:41

## 2024-02-10 RX ADMIN — SODIUM CHLORIDE, PRESERVATIVE FREE 10 ML: 5 INJECTION INTRAVENOUS at 10:26

## 2024-02-10 RX ADMIN — LANSOPRAZOLE 15 MG: 15 TABLET, ORALLY DISINTEGRATING, DELAYED RELEASE ORAL at 10:26

## 2024-02-10 RX ADMIN — CEFEPIME 2000 MG: 2 INJECTION, POWDER, FOR SOLUTION INTRAVENOUS at 15:43

## 2024-02-10 RX ADMIN — SPIRONOLACTONE 25 MG: 25 TABLET ORAL at 10:26

## 2024-02-10 RX ADMIN — ATORVASTATIN CALCIUM 40 MG: 40 TABLET, FILM COATED ORAL at 10:26

## 2024-02-10 RX ADMIN — CEFEPIME 2000 MG: 2 INJECTION, POWDER, FOR SOLUTION INTRAVENOUS at 01:48

## 2024-02-10 RX ADMIN — ALLOPURINOL 300 MG: 100 TABLET ORAL at 10:26

## 2024-02-10 RX ADMIN — SODIUM CHLORIDE: 9 INJECTION, SOLUTION INTRAVENOUS at 18:15

## 2024-02-10 RX ADMIN — Medication 100 MG: at 10:26

## 2024-02-10 NOTE — PLAN OF CARE
Problem: Discharge Planning  Goal: Discharge to home or other facility with appropriate resources  Outcome: Progressing     Problem: Safety - Adult  Goal: Free from fall injury  Outcome: Progressing  Note: Patient ambulates with steady gait. Non skid socks on. Bed in low position w/ brakes on. Call  light and personal belongings in reach.

## 2024-02-11 LAB
ALBUMIN SERPL-MCNC: 3.5 G/DL (ref 3.4–5)
ANION GAP SERPL CALCULATED.3IONS-SCNC: 12 MMOL/L (ref 3–16)
BASOPHILS # BLD: 0 K/UL (ref 0–0.2)
BASOPHILS NFR BLD: 0.5 %
BUN SERPL-MCNC: 42 MG/DL (ref 7–20)
CALCIUM SERPL-MCNC: 8.8 MG/DL (ref 8.3–10.6)
CHLORIDE SERPL-SCNC: 97 MMOL/L (ref 99–110)
CO2 SERPL-SCNC: 24 MMOL/L (ref 21–32)
CREAT SERPL-MCNC: 1.4 MG/DL (ref 0.8–1.3)
DEPRECATED RDW RBC AUTO: 19 % (ref 12.4–15.4)
EOSINOPHIL # BLD: 0 K/UL (ref 0–0.6)
EOSINOPHIL NFR BLD: 0.8 %
GFR SERPLBLD CREATININE-BSD FMLA CKD-EPI: 53 ML/MIN/{1.73_M2}
GLUCOSE SERPL-MCNC: 98 MG/DL (ref 70–99)
HCT VFR BLD AUTO: 22.2 % (ref 40.5–52.5)
HGB BLD-MCNC: 8 G/DL (ref 13.5–17.5)
LYMPHOCYTES # BLD: 0.4 K/UL (ref 1–5.1)
LYMPHOCYTES NFR BLD: 15.6 %
MAGNESIUM SERPL-MCNC: 1.6 MG/DL (ref 1.8–2.4)
MCH RBC QN AUTO: 32.4 PG (ref 26–34)
MCHC RBC AUTO-ENTMCNC: 35.9 G/DL (ref 31–36)
MCV RBC AUTO: 90.1 FL (ref 80–100)
MONOCYTES NFR BLD: 8.1 %
MRSA DNA SPEC QL NAA+PROBE: NORMAL
NEUTROPHILS # BLD: 2.1 K/UL (ref 1.7–7.7)
NEUTROPHILS NFR BLD: 75 %
PHOSPHATE SERPL-MCNC: 4.8 MG/DL (ref 2.5–4.9)
PLATELET # BLD AUTO: 99 K/UL (ref 135–450)
PMV BLD AUTO: 7.5 FL (ref 5–10.5)
POTASSIUM SERPL-SCNC: 4.3 MMOL/L (ref 3.5–5.1)
RBC # BLD AUTO: 2.46 M/UL (ref 4.2–5.9)
SODIUM SERPL-SCNC: 133 MMOL/L (ref 136–145)
VANCOMYCIN SERPL-MCNC: 12.1 UG/ML
WBC # BLD AUTO: 2.8 K/UL (ref 4–11)

## 2024-02-11 PROCEDURE — 83735 ASSAY OF MAGNESIUM: CPT

## 2024-02-11 PROCEDURE — 2580000003 HC RX 258: Performed by: INTERNAL MEDICINE

## 2024-02-11 PROCEDURE — 6360000002 HC RX W HCPCS: Performed by: INTERNAL MEDICINE

## 2024-02-11 PROCEDURE — 6370000000 HC RX 637 (ALT 250 FOR IP): Performed by: INTERNAL MEDICINE

## 2024-02-11 PROCEDURE — 85025 COMPLETE CBC W/AUTO DIFF WBC: CPT

## 2024-02-11 PROCEDURE — 1200000000 HC SEMI PRIVATE

## 2024-02-11 PROCEDURE — 36415 COLL VENOUS BLD VENIPUNCTURE: CPT

## 2024-02-11 PROCEDURE — 80202 ASSAY OF VANCOMYCIN: CPT

## 2024-02-11 PROCEDURE — 80069 RENAL FUNCTION PANEL: CPT

## 2024-02-11 RX ORDER — KETOROLAC TROMETHAMINE 15 MG/ML
15 INJECTION, SOLUTION INTRAMUSCULAR; INTRAVENOUS EVERY 6 HOURS PRN
Status: DISCONTINUED | OUTPATIENT
Start: 2024-02-11 | End: 2024-02-11 | Stop reason: ALTCHOICE

## 2024-02-11 RX ORDER — PREDNISONE 20 MG/1
40 TABLET ORAL DAILY
Status: DISCONTINUED | OUTPATIENT
Start: 2024-02-11 | End: 2024-02-11

## 2024-02-11 RX ORDER — PREDNISONE 20 MG/1
40 TABLET ORAL DAILY
Status: DISCONTINUED | OUTPATIENT
Start: 2024-02-11 | End: 2024-02-12 | Stop reason: HOSPADM

## 2024-02-11 RX ORDER — OXYCODONE HCL 5 MG/5 ML
5 SOLUTION, ORAL ORAL EVERY 6 HOURS PRN
Status: DISCONTINUED | OUTPATIENT
Start: 2024-02-11 | End: 2024-02-12 | Stop reason: HOSPADM

## 2024-02-11 RX ORDER — OXYCODONE HCL 5 MG/5 ML
7.5 SOLUTION, ORAL ORAL EVERY 6 HOURS PRN
Status: DISCONTINUED | OUTPATIENT
Start: 2024-02-11 | End: 2024-02-12 | Stop reason: HOSPADM

## 2024-02-11 RX ORDER — NALOXONE HYDROCHLORIDE 0.4 MG/ML
0.4 INJECTION, SOLUTION INTRAMUSCULAR; INTRAVENOUS; SUBCUTANEOUS PRN
Status: DISCONTINUED | OUTPATIENT
Start: 2024-02-11 | End: 2024-02-12 | Stop reason: HOSPADM

## 2024-02-11 RX ORDER — MAGNESIUM SULFATE IN WATER 40 MG/ML
2000 INJECTION, SOLUTION INTRAVENOUS ONCE
Status: COMPLETED | OUTPATIENT
Start: 2024-02-11 | End: 2024-02-11

## 2024-02-11 RX ADMIN — PREDNISONE 40 MG: 20 TABLET ORAL at 16:16

## 2024-02-11 RX ADMIN — Medication 100 MG: at 11:28

## 2024-02-11 RX ADMIN — OXYCODONE HYDROCHLORIDE 7.5 MG: 5 SOLUTION ORAL at 16:16

## 2024-02-11 RX ADMIN — CEFEPIME 2000 MG: 2 INJECTION, POWDER, FOR SOLUTION INTRAVENOUS at 02:21

## 2024-02-11 RX ADMIN — SODIUM CHLORIDE, PRESERVATIVE FREE 10 ML: 5 INJECTION INTRAVENOUS at 20:29

## 2024-02-11 RX ADMIN — ENOXAPARIN SODIUM 40 MG: 100 INJECTION SUBCUTANEOUS at 11:28

## 2024-02-11 RX ADMIN — SODIUM CHLORIDE, PRESERVATIVE FREE 10 ML: 5 INJECTION INTRAVENOUS at 20:28

## 2024-02-11 RX ADMIN — CEFEPIME 2000 MG: 2 INJECTION, POWDER, FOR SOLUTION INTRAVENOUS at 13:49

## 2024-02-11 RX ADMIN — ATORVASTATIN CALCIUM 40 MG: 40 TABLET, FILM COATED ORAL at 11:28

## 2024-02-11 RX ADMIN — MAGNESIUM SULFATE HEPTAHYDRATE 2000 MG: 40 INJECTION, SOLUTION INTRAVENOUS at 16:28

## 2024-02-11 RX ADMIN — LANSOPRAZOLE 15 MG: 15 TABLET, ORALLY DISINTEGRATING, DELAYED RELEASE ORAL at 16:16

## 2024-02-11 RX ADMIN — MAGNESIUM SULFATE HEPTAHYDRATE 2000 MG: 40 INJECTION, SOLUTION INTRAVENOUS at 11:28

## 2024-02-11 RX ADMIN — SODIUM CHLORIDE: 9 INJECTION, SOLUTION INTRAVENOUS at 07:29

## 2024-02-11 RX ADMIN — SODIUM CHLORIDE, PRESERVATIVE FREE 10 ML: 5 INJECTION INTRAVENOUS at 11:29

## 2024-02-11 RX ADMIN — ALLOPURINOL 300 MG: 100 TABLET ORAL at 11:28

## 2024-02-11 NOTE — PLAN OF CARE
Problem: Safety - Adult  Goal: Free from fall injury  Outcome: Progressing  Note:  Remains free from falls, patient is steady with ambulation, up ad tre.     Problem: Pain  Goal: Verbalizes/displays adequate comfort level or baseline comfort level  Outcome: Awaiting follow-up with provider.  Note:  Complaint of right elbow pain 5/10, notified provider Regis Jewell at 0608, and notified oncoming RN, Rosy Vargas, for follow-up with day shift provider.

## 2024-02-12 VITALS
HEART RATE: 70 BPM | BODY MASS INDEX: 28.3 KG/M2 | OXYGEN SATURATION: 97 % | RESPIRATION RATE: 18 BRPM | HEIGHT: 74 IN | WEIGHT: 220.5 LBS | SYSTOLIC BLOOD PRESSURE: 120 MMHG | DIASTOLIC BLOOD PRESSURE: 72 MMHG | TEMPERATURE: 98 F

## 2024-02-12 LAB
ALBUMIN SERPL-MCNC: 3 G/DL (ref 3.4–5)
ALBUMIN SERPL-MCNC: 3.3 G/DL (ref 3.4–5)
ALP SERPL-CCNC: 111 U/L (ref 40–129)
ALT SERPL-CCNC: 15 U/L (ref 10–40)
ANION GAP SERPL CALCULATED.3IONS-SCNC: 13 MMOL/L (ref 3–16)
AST SERPL-CCNC: 19 U/L (ref 15–37)
BACTERIA BLD CULT ORG #2: NORMAL
BACTERIA BLD CULT: NORMAL
BASOPHILS # BLD: 0 K/UL (ref 0–0.2)
BASOPHILS NFR BLD: 0.2 %
BILIRUB DIRECT SERPL-MCNC: <0.2 MG/DL (ref 0–0.3)
BILIRUB INDIRECT SERPL-MCNC: ABNORMAL MG/DL (ref 0–1)
BILIRUB SERPL-MCNC: 0.3 MG/DL (ref 0–1)
BLOOD BANK DISPENSE STATUS: NORMAL
BLOOD BANK PRODUCT CODE: NORMAL
BPU ID: NORMAL
BUN SERPL-MCNC: 48 MG/DL (ref 7–20)
CALCIUM SERPL-MCNC: 8.1 MG/DL (ref 8.3–10.6)
CO2 SERPL-SCNC: 17 MMOL/L (ref 21–32)
CREAT SERPL-MCNC: 1.4 MG/DL (ref 0.8–1.3)
CREAT UR-MCNC: 34.5 MG/DL (ref 39–259)
DEPRECATED RDW RBC AUTO: 18.2 % (ref 12.4–15.4)
DEPRECATED RDW RBC AUTO: 18.6 % (ref 12.4–15.4)
DESCRIPTION BLOOD BANK: NORMAL
EOSINOPHIL # BLD: 0 K/UL (ref 0–0.6)
EOSINOPHIL NFR BLD: 0.1 %
FUNGUS BLD CULT: NORMAL
FUNGUS BLD CULT: NORMAL
GLUCOSE SERPL-MCNC: 150 MG/DL (ref 70–99)
HCT VFR BLD AUTO: 18.9 % (ref 40.5–52.5)
HCT VFR BLD AUTO: 21 % (ref 40.5–52.5)
HGB BLD-MCNC: 6.7 G/DL (ref 13.5–17.5)
HGB BLD-MCNC: 7.5 G/DL (ref 13.5–17.5)
LYMPHOCYTES # BLD: 0.4 K/UL (ref 1–5.1)
LYMPHOCYTES NFR BLD: 14.5 %
MAGNESIUM SERPL-MCNC: 1.9 MG/DL (ref 1.8–2.4)
MCH RBC QN AUTO: 32.5 PG (ref 26–34)
MCH RBC QN AUTO: 32.8 PG (ref 26–34)
MCHC RBC AUTO-ENTMCNC: 35.5 G/DL (ref 31–36)
MCHC RBC AUTO-ENTMCNC: 35.7 G/DL (ref 31–36)
MCV RBC AUTO: 91.5 FL (ref 80–100)
MCV RBC AUTO: 92 FL (ref 80–100)
MONOCYTES # BLD: 0.2 K/UL (ref 0–1.3)
MONOCYTES NFR BLD: 9.6 %
NEUTROPHILS # BLD: 1.9 K/UL (ref 1.7–7.7)
NEUTROPHILS NFR BLD: 75.6 %
PHOSPHATE SERPL-MCNC: 3.2 MG/DL (ref 2.5–4.9)
PLATELET # BLD AUTO: 72 K/UL (ref 135–450)
PLATELET # BLD AUTO: 87 K/UL (ref 135–450)
PMV BLD AUTO: 6.9 FL (ref 5–10.5)
PMV BLD AUTO: 7.8 FL (ref 5–10.5)
POTASSIUM SERPL-SCNC: 4 MMOL/L (ref 3.5–5.1)
PROT SERPL-MCNC: 6.3 G/DL (ref 6.4–8.2)
PROT UR-MCNC: 11.1 MG/DL
PROT/CREAT UR-RTO: 0.3 MG/DL
RBC # BLD AUTO: 2.07 M/UL (ref 4.2–5.9)
RBC # BLD AUTO: 2.28 M/UL (ref 4.2–5.9)
SODIUM SERPL-SCNC: 127 MMOL/L (ref 136–145)
SODIUM UR-SCNC: 72 MMOL/L
WBC # BLD AUTO: 2.5 K/UL (ref 4–11)
WBC # BLD AUTO: 2.7 K/UL (ref 4–11)

## 2024-02-12 PROCEDURE — 6360000002 HC RX W HCPCS: Performed by: INTERNAL MEDICINE

## 2024-02-12 PROCEDURE — 6370000000 HC RX 637 (ALT 250 FOR IP): Performed by: INTERNAL MEDICINE

## 2024-02-12 PROCEDURE — 85027 COMPLETE CBC AUTOMATED: CPT

## 2024-02-12 PROCEDURE — 2580000003 HC RX 258: Performed by: INTERNAL MEDICINE

## 2024-02-12 PROCEDURE — 83735 ASSAY OF MAGNESIUM: CPT

## 2024-02-12 PROCEDURE — 84300 ASSAY OF URINE SODIUM: CPT

## 2024-02-12 PROCEDURE — 80076 HEPATIC FUNCTION PANEL: CPT

## 2024-02-12 PROCEDURE — 82570 ASSAY OF URINE CREATININE: CPT

## 2024-02-12 PROCEDURE — 84156 ASSAY OF PROTEIN URINE: CPT

## 2024-02-12 PROCEDURE — 85025 COMPLETE CBC W/AUTO DIFF WBC: CPT

## 2024-02-12 PROCEDURE — 36430 TRANSFUSION BLD/BLD COMPNT: CPT

## 2024-02-12 PROCEDURE — 80069 RENAL FUNCTION PANEL: CPT

## 2024-02-12 PROCEDURE — 36415 COLL VENOUS BLD VENIPUNCTURE: CPT

## 2024-02-12 RX ORDER — PREDNISONE 20 MG/1
TABLET ORAL
Qty: 14 TABLET | Refills: 0 | Status: SHIPPED | OUTPATIENT
Start: 2024-02-13

## 2024-02-12 RX ORDER — SODIUM CHLORIDE 9 MG/ML
INJECTION, SOLUTION INTRAVENOUS PRN
Status: DISCONTINUED | OUTPATIENT
Start: 2024-02-12 | End: 2024-02-12 | Stop reason: HOSPADM

## 2024-02-12 RX ORDER — LEVOFLOXACIN 750 MG/1
750 TABLET, FILM COATED ORAL DAILY
Qty: 3 TABLET | Refills: 0 | Status: SHIPPED | OUTPATIENT
Start: 2024-02-12 | End: 2024-02-15

## 2024-02-12 RX ORDER — FUROSEMIDE 10 MG/ML
40 INJECTION INTRAMUSCULAR; INTRAVENOUS ONCE
Status: COMPLETED | OUTPATIENT
Start: 2024-02-12 | End: 2024-02-12

## 2024-02-12 RX ADMIN — PREDNISONE 40 MG: 20 TABLET ORAL at 08:34

## 2024-02-12 RX ADMIN — SODIUM CHLORIDE, PRESERVATIVE FREE 10 ML: 5 INJECTION INTRAVENOUS at 08:25

## 2024-02-12 RX ADMIN — SODIUM CHLORIDE, PRESERVATIVE FREE 10 ML: 5 INJECTION INTRAVENOUS at 08:27

## 2024-02-12 RX ADMIN — Medication 100 MG: at 08:35

## 2024-02-12 RX ADMIN — CEFEPIME 2000 MG: 2 INJECTION, POWDER, FOR SOLUTION INTRAVENOUS at 02:26

## 2024-02-12 RX ADMIN — ATORVASTATIN CALCIUM 40 MG: 40 TABLET, FILM COATED ORAL at 08:35

## 2024-02-12 RX ADMIN — FUROSEMIDE 40 MG: 10 INJECTION, SOLUTION INTRAMUSCULAR; INTRAVENOUS at 11:33

## 2024-02-12 RX ADMIN — LANSOPRAZOLE 15 MG: 15 TABLET, ORALLY DISINTEGRATING, DELAYED RELEASE ORAL at 08:34

## 2024-02-12 RX ADMIN — SODIUM CHLORIDE: 9 INJECTION, SOLUTION INTRAVENOUS at 02:24

## 2024-02-12 RX ADMIN — SODIUM CHLORIDE: 9 INJECTION, SOLUTION INTRAVENOUS at 02:23

## 2024-02-12 RX ADMIN — ALLOPURINOL 300 MG: 100 TABLET ORAL at 08:35

## 2024-02-12 RX ADMIN — ENOXAPARIN SODIUM 40 MG: 100 INJECTION SUBCUTANEOUS at 08:35

## 2024-02-12 RX ADMIN — CEFEPIME 2000 MG: 2 INJECTION, POWDER, FOR SOLUTION INTRAVENOUS at 14:51

## 2024-02-12 NOTE — PLAN OF CARE
Problem: Safety - Adult  Goal: Free from fall injury  Outcome: Progressing  Note:  Remains free from falls, is independent, up ad tre.     Problem: Pain  Goal: Verbalizes/displays adequate comfort level or baseline comfort level  Outcome: Progressing  Note:  Denies pain/needs, resting throughout the evening in bed.

## 2024-02-12 NOTE — CARE COORDINATION
Case Management Assessment            Discharge Note                    Date / Time of Note: 2/12/2024 4:29 PM                  Discharge Note Completed by: Jeremy Ledesma RN    Patient Name: Arpit Vinson   YOB: 1950  Diagnosis: Anemia [D64.9]  Healthcare-associated pneumonia [J18.9]  Pneumonia of right lower lobe due to infectious organism [J18.9]   Date / Time: 2/8/2024  4:06 PM    Current PCP: Sissy Evangelista MD  Clinic patient: No    Hospitalization in the last 30 days: Yes  Readmission Assessment  Number of Days since last admission?: 8-30 days (discharged 1/26)  Previous Disposition: Home with Home Health  Who is being Interviewed: Patient  What was the patient's/caregiver's perception as to why they think they needed to return back to the hospital?: Other (Comment) (shortness of breath)  Did you visit your Primary Care Physician after you left the hospital, before you returned this time?: Yes  Did you see a specialist, such as Cardiac, Pulmonary, Orthopedic Physician, etc. after you left the hospital?: No (spoke via telephone)  Who advised the patient to return to the hospital?: Self-referral  Does the patient report anything that got in the way of taking their medications?: No  In our efforts to provide the best possible care to you and others like you, can you think of anything that we could have done to help you after you left the hospital the first time, so that you might not have needed to return so soon?: Other (Comment) (nothing)    Advance Directives:  Code Status: Full Code  [place] DNR form completed and on chart: No, Not Indicated    Financial:  Payor: MEDICARE / Plan: MEDICARE PART A AND B / Product Type: *No Product type* /      Pharmacy:    [facility] [id number] [place], [place] [facility] [address] [phone] - F [phone]  [address] [address]  Phone: [phone] Fax: [phone]    [facility] [employer] [facility] #[id number] [address] 
Resumption of Care    Patient currently active with [facility] prior to admission. Orders faxed to [facility] for Resumption of Care by 2/14  Electronically signed by Yessenia Keys LPN on 2/12/2024 at 3:16 PM    
and Has needed Durable Medical Equipment at home    Barriers to discharge: Medical complications    Additional Case Management Notes: This CM met with patient at bedside. Patient is alert and able to answer assessment questions.   Patient is from home with his spouse. He denies current HHC (recent discharge shows he was set up with [facility], he stated he does not need them any longer). He is active with [facility] for his tube feed delivery (they will need a resumption of services order at discharge). He denies current DME use (besides tube feed pump). He is an active  and family can provide transport at discharge. He has a PCP that he is active with and denies issues with obtaining medications.  Currently, PT/OT evaluations pending.   Hem/Onc following.  On IV ABX for pneumonia.    The Plan for Transition of Care is related to the following treatment goals of Anemia [D64.9]  Healthcare-associated pneumonia [J18.9]  Pneumonia of right lower lobe due to infectious organism [J18.9]    IF APPLICABLE: The Patient and/or patient representative Arpit and his family were provided with a choice of provider and agrees with the discharge plan. Freedom of choice list with basic dialogue that supports the patient's individualized plan of care/goals and shares the quality data associated with the providers was provided to: Patient   Patient Representative Name:       The Patient and/or Patient Representative Agree with the Discharge Plan? Yes    NIYA OROZCO  [phone]

## 2024-02-12 NOTE — DISCHARGE SUMMARY
Hospital Discharge Summary    Patient's PCP: Sissy Evangelista MD  Admit Date: 2/8/2024   Discharge Date: 2/12/2024    Admitting Physician: Dr. Annalise Cervantes MD  Discharge Physician: Dr. Nelda Modi MD   Consults: hematology/oncology    HPI: 73 y.o. male with tonsillar cancer s/p radiation, currently undergoing chemotherapy, gastroesophageal reflux disease, alcohol use disorder and hyperlipidemia, as well as recent admission for pneumonia 1/23/2024 to 1/26/2024, who presented to the ED, with 8 days history of shortness of breath and chills.       He has a hx of Tonsil cancer, followed by Dr. Ashby as an outpatient, last seen in office 2/6/24 last chemotherapy February 6 and presented to the Oncology office on 2/8/24 with  shortness of breath with chills and general malaise,  baseline cough.     He has a cough which is productive of whitish sputum.  He denies fever.       He was admitted for pneumonia with sepsis 1/23/2024 to 1/26/2024. He was treated with antibiotics in the hospital and he improved.       Appears he recently had elective PEG tube placement, during which he was found with Afib/ Aflutter, 2 week CAM showed NSR,  2.7% afib, runs of AT, longest 19 beats, PAC 0.8%, PVC 0.3%, no symptoms. He was on eliquis and amiodarone, atenolol; but appears amiodarone and Eliquis were recently discontinued during recent hospitalization.     In the ED, he was tachycardic in the 100s, saturating at 100% on RA.  Lab work showed no leukocytosis.  Hemoglobin was 9.4.  Platelet count 134.  Alk phos mildly elevated at 155, AST mildly elevated at 42.  Creatinine was elevated at 1.4 ( 0.9 recently);  COVID and the flu test were both negative     Chest x-ray showed new patchy opacities in the right lower lung.      Brief hospital course:  Given the concern of the patients presentation and the concern of the possible multi-factorial etiology contributing to patients symptomatology. Patient was admitted and evaluated and

## 2024-02-12 NOTE — PLAN OF CARE
Problem: Discharge Planning  Goal: Discharge to home or other facility with appropriate resources  Outcome: Progressing     Problem: Safety - Adult  Goal: Free from fall injury  2/11/2024 2025 by Rosy Vargas RN  Outcome: Progressing     Problem: Pain  Goal: Verbalizes/displays adequate comfort level or baseline comfort level  2/11/2024 2025 by Rosy Vargas, RN  Outcome: Progressing

## 2024-02-12 NOTE — DISCHARGE INSTR - COC
{Therapy; copd oxygen:43722}  Ventilator:    {[facility] Vent List:388964367}    Rehab Therapies: {THERAPEUTIC INTERVENTION:4672080681}  Weight Bearing Status/Restrictions: {[facility] Weight Bearin}  Other Medical Equipment (for information only, NOT a DME order):  {EQUIPMENT:921389768}  Other Treatments: ***    Patient's personal belongings (please select all that are sent with patient):  {[facility] DME Belongings:326618530}    RN SIGNATURE:  {Esignature:005778659}    CASE MANAGEMENT/SOCIAL WORK SECTION    Inpatient Status Date: ***    Readmission Risk Assessment Score:  Readmission Risk              Risk of Unplanned Readmission:  38           Discharging to Facility/ Agency   Name:   Address:  Phone:  Fax:    Dialysis Facility (if applicable)   Name:  Address:  Dialysis Schedule:  Phone:  Fax:    / signature: {Esignature:323898251}    PHYSICIAN SECTION    Prognosis: Fair    Condition at Discharge: Stable    Rehab Potential (if transferring to Rehab): fair    Recommended Labs or Other Treatments After Discharge: resume previous home tube feed orders    Physician Certification: I certify the above information and transfer of Arpit Vinson  is necessary for the continuing treatment of the diagnosis listed and that he requires Home Care for less 30 days.     Update Admission H&P: No change in H&P    PHYSICIAN SIGNATURE:  Electronically signed by Nelda Modi MD on 24 at 3:51 PM EST

## 2024-02-12 NOTE — CONSULTS
[facility]    Consult Note      Requesting Physician:  Yariel Saldaña     CHIEF COMPLAINT:  Shortness of breath       HISTORY OF PRESENT ILLNESS:    Mr. Vinson  is a 73 y.o. male we are seeing in consultation for Tonsil cancer. He is followed by Dr. Ashby as an outpatient, last seen in office 2/6/24.    He presents to the office on 2/8/24 with c/o shortness of breath. He also reported chills and general malaise. He continues to endorse baseline cough with white sputum. In the ED patient had a chest x-ray done which showed a right lower lobe pneumonia and was started on vancomycin and cefepime.     Today he notes he is feeling much better. He has questions today about his plan moving forward. Was scheduled to see Nimisha in office today for Rad onc follow up. He has questions today about his anemia.         Past Medical History:  Past Medical History:   Diagnosis Date    Arthritis     Hepatitis C     Treated    History of meniscal tear     Right knee     Hyperlipidemia     Hypertension     Impaired fasting glucose     Tinnitus     TMJ dysfunction     Tonsillar mass     right       Past Surgical History:  Past Surgical History:   Procedure Laterality Date    CATARACT EXTRACTION Bilateral     COLONOSCOPY  Aug., 2014 ( 2017 )    Dr. Tatum - tubular adenomas ( 3 )    COLONOSCOPY  *Nov.26, 2019 ( 2024 )    Dr. Banuelos - tubular adenoma    GASTROSTOMY TUBE PLACEMENT N/A 12/7/2023    PERCUTANEOUS ENDOSCOPIC GASTROSTOMY TUBE INSERTION performed by Maldonado Weaver MD at [facility] OR    MOUTH SURGERY Right 11/09/2023    BIOPSY RIGHT TONSIL, DIRECT LARYNGOSCOPY performed by Leopoldo Matias MD at [facility] OR    PORT SURGERY N/A 12/7/2023    PORT-A-CATHETER PLACEMENT; performed by Maldonado Weaver MD at [facility] OR    TONSILLECTOMY      UPPER GASTROINTESTINAL ENDOSCOPY N/A 1/24/2024    EGD BIOPSY performed by 
       Ph: [phone], Fax: [phone]           [facility]               Reason for admission:                 Shortness of breath.    Brief Summary :     Arpit Vinson is being seen by nephrology for acute kidney injury.      Interval History and plan:      Blood pressure is relatively low.  Urine output is measured.    He likely has ATN from cisplatin.  Please hold vancomycin as levels are elevated.  I will check urine sodium and creatinine.  No other nephrotoxins were identified.  Please transfuse to keep hemoglobin greater than 7.  Adjust antibiotics to current creatinine clearance.  Hold Aldactone.  Give normal saline bolus 1 L followed by maintenance fluid.                   Assessment :        Acute kidney injury: He has a baseline normal GFR.  On arrival creatinine 1.4 which is worsened to 1.6.    Peak vancomycin level was 24.  He is anemic with a hemoglobin of 7.4.  Urinalysis did not show any blood but does have trace protein.  He received cisplatin for squamous cell cancer.    Pneumonia: He is on antibiotics including cefepime and vancomycin.         [facility] would like to thank Nelda Modi MD   for opportunity to serve this patient      Please call with questions at-   24 Hrs Answering service [phone] or  7 am- 5 pm via Perfect serve or cell phone  Dr.Muhammad Paul Hays MD       HPI :     Arpit Vinson is a 73 y.o. male presented to   the hospital on 2/8/2024 with shortness of breath .    He has past medical history of hypertension, hyperlipidemia, type 2 diabetes, hepatitis C, history of tonsillar cancer undergoing chemotherapy.  He also has GERD.  He presented with generalized malaise, weakness, fatigue, chills with no fever.  He has cough which is productive of whitish sputum.  On admission x-ray showed right lower lobe pneumonia and was started on antibiotics.  He was placed on oxygen.    He is now admitted with [facility] associated 
      Comprehensive Nutrition Assessment    RECOMMENDATIONS:  Nutrition Support:  Initiate  TwoCal HN  @ 100mL/hr over 12hours (8p-8a)  Recommend water bolus 240ml every 3 hours while TF running  PO Diet: Rec'd adv to full liquid diet- discussing w/ oncology (currently NPO)  Nutrition Education: Education not indicated     NUTRITION ASSESSMENT:   Nutritional summary & status: Consult for TF ordering and mgmt.  Pt known to this RD from previous adm.  Pt PEG dependent for nutritional needs.  Pt is not following TF recommendations.  Pt reports using 4-6 cartons of TF throughout the day and add'l cartons throughout night.  RD reviewed 5 cartons daily which can be completed over 12 hours at 100ml/h.  Pt reports understand.  Pt states he was trying to optimize is cell count by increasing his TF regimen.  RD explain role of nutrition support r/t nutritional status and optimizing overall and endure treatment a full strength.  Pt expressed understanding.   Admission // PMH: anemia // tonsillar cancer, hyperlipidemia, gastroesophageal reflux    MALNUTRITION ASSESSMENT  Context of Malnutrition: Chronic Illness   Malnutrition Status: Severe malnutrition  Findings of the 6 clinical characteristics of malnutrition (Minimum of 2 out of 6 clinical characteristics is required to make the diagnosis of moderate or severe Protein Calorie Malnutrition based on AND/ASPEN Guidelines):  Energy Intake:  75% or less estimated energy requirements for 1 month or longer  Weight Loss:  Greater than 7.5% over 3 months     Body Fat Loss:  Severe body fat loss Buccal region   Muscle Mass Loss:  Mild muscle mass loss Temples (temporalis)    NUTRITION DIAGNOSIS   Inadequate oral intake related to swallowing difficulty as evidenced by nutrition support - enteral nutrition    Nutrition Monitoring and Evaluation:   Food/Nutrient Intake Outcomes:  Food and Nutrient Intake, Supplement Intake  Physical Signs/Symptoms Outcomes:  Biochemical Data, Nutrition 
Clinical Pharmacy Progress Note    Consult to pharmacy to dose vancomycin for pneumonia on 2/9 reads \"Okay to discontinue vancomycin if MRSA DNA probe is negative \"    MRSA probe resulted negative this evening - Will discontinue vancomycin and sign off on dosing  If resumed, please re-consult Pharmacy     Please call with questions:  [phone] (Main Pharmacy)    Radha Castaneda  Pharm.D. BCPS    2/11/2024 7:13 PM            
[facility] -  Clinical Pharmacy Note    Vancomycin - Management by Pharmacy    Consult Date(s): 2/8/24  Consulting Provider(s): Dr PHUC Cervantes    Assessment / Plan  Pneumonia (HAP) Vancomycin  Concurrent Antimicrobials: Cefepime    (note: adjusted cefepime dosing today to 2g extended infusion every 12h as CrCl est now <60 ml/min)  Day of Vanc Therapy / Ordered Duration: Day 1 of 7  Current Dosing Method: AUC  -Bayesian dosing  Therapeutic Goal:  -600 mg/L*h  Pt with ROSANNA (SCr 1.4-->1.5); baseline ~0.9. UOP not documented  Received loading dose of 2500 mg (25mg/kg) at 0100 today  Random level today = 16.3 mg/L (drawn ~12 h after loading dose)  Will start 1500 mg IV q24h, next dose this evening  Predicted AUC ~ 476 mg/L*hr and steady-state trough ~ 13 mg/L on this regimen   Repeat level tomorrow am, as renal function not stable   Will order nasal MRSA to guide potential de-escalation   Will continue to monitor clinical condition and make adjustments to regimen as appropriate.    Thank you for consulting pharmacy,    Please call with any questions    Radha Castaneda  Pharm.D. BCPS  [phone] (Main Pharmacy)        Interval update:  Pt is afebrile and HDS. On O2 per NC    Subjective/Objective:   Arpit Vinson is a 73 y.o. male with a PMHx significant for tonsillar lung CA (on chemotherapy) , HLD, GERD,   who is admitted with pneumonia (HAP) .     Pharmacy is consulted to dose vancomycin .    Ht Readings from Last 1 Encounters:   02/08/24 1.88 m (6' 2\")     Wt Readings from Last 1 Encounters:   02/09/24 100 kg (220 lb 8 oz)     Current & Prior Antimicrobial Regimen(s):  Cefepime (2/8--current)  Vancomycin - pharmacy dosed  2500 mg IV x1 (2/9)  1500 mg IV q24h (2/9--current)    Vancomycin Level(s) / Doses:    Date Time Dose Type of Level / Level Interpretation   2/9 1239 2500 mg x1 Random= 16.3 Start 1500 mg q24h for AUC/ssTr 476/13          Note: Serum levels collected for AUC-based dosing may be high if 
is not still drinking.  May need to watch for withdrawal if patient is admitted for significant amount of time.    Thank you for allowing us to participate in this patient's care.     Please note this document has been produced using speech recognition software and may contain errors related to that system including errors in grammar, punctuation, and spelling, as well as words and phrases that may be inappropriate. If there are any questions or concerns please feel free to contact me for clarification.    A total of 60 minutes was spent on today's patient encounter.  If applicable, non-patient-facing activities:     (  x )   Preparing to see the patient and reviewing records     (   )   Individual interpretation of results      (  x )   Discussion or coordination of care with other health care professionals     ( x  )   Ordering of unique tests, medications, or procedures     (  x )   Documentation within the EHR       [address] was available for consultation this visit.  Please Contact Through Perfect Serve

## 2024-02-13 ENCOUNTER — CARE COORDINATION (OUTPATIENT)
Dept: CASE MANAGEMENT | Age: 74
End: 2024-02-13

## 2024-02-13 NOTE — PROGRESS NOTES
Speech Language Pathology  Facility/Department:[address] TELEMETRY   Evaluation / Discharge  Name: Arpit Vinson  : 1950  MRN: [id number]                                                     Patient Diagnosis(es):   Patient Active Problem List    Diagnosis Date Noted    Gum symptoms 2022    Chronic gout of left foot 2022    Weight gain 2022    Hyperglycemia 2022    Anemia, unspecified 2022    Left hand pain 2022    Pain and swelling of lower extremity 2022    Chronic pain of both knees 2022    Anemia 2024    Healthcare-associated pneumonia 2024    Iron deficiency anemia due to chronic blood loss 2024    Mucositis due to radiation therapy 2024    Severe malnutrition (HCC) 2024    Neutropenic sepsis (HCC) 2024    Secondary hypercoagulable state (HCC) 2024    Post-op pain 2023    Paroxysmal atrial fibrillation (HCC) 2023    Alcohol abuse, daily use 2023    Gout 2023    Hepatic cirrhosis (HCC) 2023    Hyperlipidemia 2023    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    Neck mass 2023    Malignant neoplasm of tonsil (HCC) 2023    Tonsillar mass 2023    Lymphadenopathy 10/05/2023    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 
       Ph: [phone], Fax: [phone]           [facility]               Reason for admission:                 Shortness of breath.    Brief Summary :     Arpit Vinson is being seen by nephrology for acute kidney injury.      Interval History and plan:      Blood pressure is better   Urine output is 1200 ml   Creatinine is better 1.6 > 1.4      He likely has tubular injury from cisplatin. He completed 6 treatments so far   Resume vancomycin if needed. Levels are better now  No other nephrotoxins were identified.  Please transfuse to keep hemoglobin greater than 7.  Adjust antibiotics to current creatinine clearance.  Hold Aldactone.  DC IVF and give lasix X 1                    Assessment :        Acute kidney injury: He has a baseline normal GFR.  On arrival creatinine 1.4 which is worsened to 1.6.    Peak vancomycin level was 24.  He is anemic with a hemoglobin of 7.4.  Urinalysis did not show any blood but does have trace protein.  He received cisplatin for squamous cell cancer.    Pneumonia: He is on antibiotics including cefepime and vancomycin.         [facility] Nephrology would like to thank Nelda Modi MD   for opportunity to serve this patient      Please call with questions at-   24 Hrs Answering service [phone] or  7 am- 5 pm via Perfect serve or cell phone  Dr.Muhammad Paul Hays MD       HPI :     Arpit Vinson is a 73 y.o. male presented to   the hospital on 2/8/2024 with shortness of breath .    He has past medical history of hypertension, hyperlipidemia, type 2 diabetes, hepatitis C, history of tonsillar cancer undergoing chemotherapy.  He also has GERD.  He presented with generalized malaise, weakness, fatigue, chills with no fever.  He has cough which is productive of whitish sputum.  On admission x-ray showed right lower lobe pneumonia and was started on antibiotics.  He was placed on oxygen.    He is now admitted with [facility] associated 
       Ph: [phone], Fax: [phone]           [facility]               Reason for admission:                 Shortness of breath.    Brief Summary :     Arpit Vinson is being seen by nephrology for acute kidney injury.      Interval History and plan:      Blood pressure is better   Urine output is not measured.  Creatinine is better 1.6 > 1.4    He likely has tubular injury from cisplatin. He completed 6 treatments so far   Please hold vancomycin as levels are elevated.  I will check urine sodium and creatinine.  No other nephrotoxins were identified.  Please transfuse to keep hemoglobin greater than 7.  Adjust antibiotics to current creatinine clearance.  Hold Aldactone.  Continue IVF                   Assessment :        Acute kidney injury: He has a baseline normal GFR.  On arrival creatinine 1.4 which is worsened to 1.6.    Peak vancomycin level was 24.  He is anemic with a hemoglobin of 7.4.  Urinalysis did not show any blood but does have trace protein.  He received cisplatin for squamous cell cancer.    Pneumonia: He is on antibiotics including cefepime and vancomycin.         [facility] Nephrology would like to thank Nelda Modi MD   for opportunity to serve this patient      Please call with questions at-   24 Hrs Answering service [phone] or  7 am- 5 pm via Perfect serve or cell phone  Dr.Muhammad Paul Hays MD       HPI :     Arpit Vinson is a 73 y.o. male presented to   the hospital on 2/8/2024 with shortness of breath .    He has past medical history of hypertension, hyperlipidemia, type 2 diabetes, hepatitis C, history of tonsillar cancer undergoing chemotherapy.  He also has GERD.  He presented with generalized malaise, weakness, fatigue, chills with no fever.  He has cough which is productive of whitish sputum.  On admission x-ray showed right lower lobe pneumonia and was started on antibiotics.  He was placed on oxygen.    He is now admitted with 
   Progress Note  Admit Date: 2/8/2024           CC: F/U for shortness of breath and fatigue      73 y.o. male with tonsillar cancer s/p radiation, currently undergoing chemotherapy, gastroesophageal reflux disease, alcohol use disorder and hyperlipidemia, as well as recent admission for pneumonia 1/23/2024 to 1/26/2024, who presented to the ED, with 8 days history of shortness of breath and chills.      He has a hx of Tonsil cancer, followed by Dr. Ashby as an outpatient, last seen in office 2/6/24 last chemotherapy February 6 and presented to the Oncology office on 2/8/24 with  shortness of breath with chills and general malaise,  baseline cough.    He has a cough which is productive of whitish sputum.  He denies fever.      He was admitted for pneumonia with sepsis 1/23/2024 to 1/26/2024. He was treated with antibiotics in the hospital and he improved.      Appears he recently had elective PEG tube placement, during which he was found with Afib/ Aflutter, 2 week CAM showed NSR,  2.7% afib, runs of AT, longest 19 beats, PAC 0.8%, PVC 0.3%, no symptoms. He was on eliquis and amiodarone, atenolol; but appears amiodarone and Eliquis were recently discontinued during recent hospitalization.    In the ED, he was tachycardic in the 100s, saturating at 100% on RA.  Lab work showed no leukocytosis.  Hemoglobin was 9.4.  Platelet count 134.  Alk phos mildly elevated at 155, AST mildly elevated at 42.  Creatinine was elevated at 1.4 ( 0.9 recently);  COVID and the flu test were both negative    Chest x-ray showed new patchy opacities in the right lower lung.      Interval HPI    Patient was admitted and started on vancomycin and cefepime.    Patient feels better , but continues to be weak.    Hemoglobin around 6 on 2/9/24, from 9 on admission (2/8/24),. Transfused PRBC     He denies fever or chills.    No chest pain.       Review of Systems - Negative except as in HPI.     Scheduled Medications:    vancomycin  1,250 mg 
   Progress Note  Admit Date: 2/8/2024           CC: F/U for shortness of breath and fatigue      73 y.o. male with tonsillar cancer s/p radiation, currently undergoing chemotherapy, gastroesophageal reflux disease, alcohol use disorder and hyperlipidemia, as well as recent admission for pneumonia 1/23/2024 to 1/26/2024, who presented to the ED, with 8 days history of shortness of breath and chills.      He has a hx of Tonsil cancer, followed by Dr. Ashby as an outpatient, last seen in office 2/6/24 last chemotherapy February 6 and presented to the Oncology office on 2/8/24 with  shortness of breath with chills and general malaise,  baseline cough.    He has a cough which is productive of whitish sputum.  He denies fever.      He was admitted for pneumonia with sepsis 1/23/2024 to 1/26/2024. He was treated with antibiotics in the hospital and he improved.      Appears he recently had elective PEG tube placement, during which he was found with Afib/ Aflutter, 2 week CAM showed NSR,  2.7% afib, runs of AT, longest 19 beats, PAC 0.8%, PVC 0.3%, no symptoms. He was on eliquis and amiodarone, atenolol; but appears amiodarone and Eliquis were recently discontinued during recent hospitalization.    In the ED, he was tachycardic in the 100s, saturating at 100% on RA.  Lab work showed no leukocytosis.  Hemoglobin was 9.4.  Platelet count 134.  Alk phos mildly elevated at 155, AST mildly elevated at 42.  Creatinine was elevated at 1.4 ( 0.9 recently);  COVID and the flu test were both negative    Chest x-ray showed new patchy opacities in the right lower lung.      Interval HPI    Patient was admitted and started on vancomycin and cefepime.    Patient feels better , but continues to be weak.    Hemoglobin around 6 on 2/9/24, from 9 on admission (2/8/24),. Transfused PRBC.  Hemoglobin improved to 8     He denies fever or chills.    No chest pain.    He has had pain and swelling on the right elbow and right 2nd CMC joint: warm 
  Physician Progress Note      PATIENT:               MICHAELLE SINGER  CSN #:                  [id number]  :                       1950  ADMIT DATE:       2024 4:06 PM  DISCH DATE:  RESPONDING  PROVIDER #:        Nelda Modi MD          QUERY TEXT:    Patient admitted with Aspiration PNA. Noted to have \"Severe malnutrition\" per   dietician assessment on . If possible, please document in progress notes   and discharge summary if you are evaluating and /or treating any of the   following:      The medical record reflects the following:  Risk Factors: Squamous cell carcinoma of the right tonsil; PEG tube  Clinical Indicators: per dietician notes  \" Severe malnutrition. Energy   Intake:  75% or less estimated energy requirements for 1  month or longer. Weight Loss:  Greater than 7.5% over 3 months. Body Fat Loss:    Severe body fat loss Buccal region. Muscle Mass  Loss:  Mild muscle mass loss Temples (temporalis)  Treatment: CBC, CMP, dietary consult, per dietician recommendation \"Initiate    TwoCal HN  @ 100mL/hr over 12hours (8p-8a).  Recommend water bolus 240ml every 3 hours while TF running.    ASPEN Criteria:    https://aspenjournals.onlinelibrary.berger.com/doi/full/10.1177/780406617487670  5  Options provided:  -- Protein calorie malnutrition severe  -- Other - I will add my own diagnosis  -- Disagree - Not applicable / Not valid  -- Disagree - Clinically unable to determine / Unknown  -- Refer to Clinical Documentation Reviewer    PROVIDER RESPONSE TEXT:    This patient has severe protein calorie malnutrition.    Query created by: Nafisa Cash on 2024 3:56 PM      Electronically signed by:  Nelda Modi MD 2024 7:06 PM          
4 Eyes Skin Assessment     NAME:  Arpit Vinson  YOB: 1950  MEDICAL RECORD NUMBER:  [id number]    The patient is being assessed for  Admission    I agree that at least one RN has performed a thorough Head to Toe Skin Assessment on the patient. ALL assessment sites listed below have been assessed.      Areas assessed by both nurses:    Head, Face, Ears, Shoulders, Back, Chest, Arms, Elbows, Hands, Sacrum. Buttock, Coccyx, Ischium, and Legs. Feet and Heels  G-tube noted        Does the Patient have a Wound? No noted wound(s)       Pa Prevention initiated by RN: No  Wound Care Orders initiated by RN: No    Pressure Injury (Stage 3,4, Unstageable, DTI, NWPT, and Complex wounds) if present, place Wound referral order by RN under : No    New Ostomies, if present place, Ostomy referral order under : No     Nurse 1 eSignature: Electronically signed by Kathrin Nunn RN on 2/9/24 at 12:17 AM EST    **SHARE this note so that the co-signing nurse can place an eSignature**    Nurse 2 eSignature: Electronically signed by Aly Saez RN on 2/9/24 at 5:58 AM EST   
D:  Please note, patient is complaining of 5/10 right elbow pain describing it as \"achy\" and localized.  There are no PRN orders for this level of pain.  Please advise.  A:  Notified provider, Regis Jewell DO.  R:  Per provider above, \"I'm on it.\"    D:  Notified oncoming RN, Rosy Vargas, to follow-up with day time provider for orders not yet received, during bedside shift report/handoff of patient care at shift change.  
Labs and notes reviewed   Full consult to follow  
Occupational Therapy/ Physical Therapy  No treatment    OT/PT eval and treat orders received and appreciated. Per RN pt up ad tre in room. Discussed with pt who reports he is at his baseline mobility wise and has no additional therapy needs. Plans to return home with his wife to assist, please re-order should new therapy needs arise.    Kesha Parker, OTR/L #[id number]  Ruth Dela Cruz PT [id number]     
Pt arrived to room 6327 on 2L of o2. A&Ox4. VSS. pt oriented to both room and call light. All safety measures are in place. Will continue to monitor.  
Pt is A&Ox4 and discharging home with wife> Pt's Ivs and telemetry removed. Pt and wife's concerns and questions addressed with RN. Pt left with all personal belongings and discharge instructions.  
[facility] - Clinical Pharmacy Note - Extended Infusion Beta-Lactam Adjustment    Cefepime ordered for treatment of HAP. Per [facility] Extended Infusion Beta-Lactam Policy, cefepime will be changed to 2000 mg every 8 hours.     Estimated Creatinine Clearance: Estimated Creatinine Clearance: 61 mL/min (A) (based on SCr of 1.4 mg/dL (H)).  Dialysis Status, ROSANNA, CKD: n/a  BMI: Body mass index is 29.15 kg/m².    Rationale for Adjustment: Agent is renally eliminated and demonstrates time-dependent effect on bacterial eradication. Extended-infusion dosing strategy aims to enhance microbiologic and clinical efficacy.    Pharmacy will continue to monitor cultures and sensitivities (where available) and adjust dose as necessary.      Please call with any questions.  Marcy Blue, PharmD  Main Pharmacy: [id number]     
[facility] -  Clinical Pharmacy Note    Vancomycin - Management by Pharmacy    Consult Date(s): 2/8/24  Consulting Provider(s): Dr PHUC Cervantes    Assessment / Plan  Pneumonia (HAP) Vancomycin  Concurrent Antimicrobials: Cefepime    (note: adjusted cefepime dosing today to 2g extended infusion every 12h as CrCl est now <60 ml/min)  Day of Vanc Therapy / Ordered Duration: Day 3 of 7  Current Dosing Method: AUC  -Bayesian dosing  Therapeutic Goal:  -600 mg/L*h  Pt with ROSANNA (SCr 1.4-->1.5-->1.6-->1.4); baseline ~0.9.   UOP 4x unmeasured  Vanc held by Dr. Hays yesterday  Random level today = 12.1 mg/L   Will plan to resume 1250 mg IV q24h once unheld  Predicted AUC ~ 459 mg/L*hr and steady-state trough ~ 13.1 mg/L on this regimen   MRSA nares in process  Will continue to monitor clinical condition and make adjustments to regimen as appropriate.    Thank you for consulting pharmacy,    Please call with any questions    Miguel Hernandez PharmD  PGY1 Pharmacy Resident  Phone: [phone]  Main Pharmacy: [id number]  2/11/2024 11:19 AM      Interval update:  Pt is afebrile and HDS. Now on room air.    Subjective/Objective:   Arpit Vinson is a 73 y.o. male with a PMHx significant for tonsillar lung CA (on chemotherapy) , HLD, GERD,   who is admitted with pneumonia (HAP) .     Pharmacy is consulted to dose vancomycin .    Ht Readings from Last 1 Encounters:   02/08/24 1.88 m (6' 2\")     Wt Readings from Last 1 Encounters:   02/09/24 100 kg (220 lb 8 oz)     Current & Prior Antimicrobial Regimen(s):  Cefepime (2/8--current)  Vancomycin - pharmacy dosed  2500 mg IV x1 (2/9)  1500 mg IV q24h (2/9--2/10)  1250 mg IV q24h (2/10 - current)    Vancomycin Level(s) / Doses:    Date Time Dose Type of Level / Level Interpretation   2/9 1239 2500 mg x1 Random= 16.3 Start 1500 mg q24h for AUC/ssTr 476/13   2/10 0516 1500 mg q24h Random = 24 mg/L Decrease to 1250 mg q24h  , Tr 14.8   2/11 0726 On hold Random = 12.1 mg/L Resume 1250 
[facility] -  Clinical Pharmacy Note    Vancomycin - Management by Pharmacy    Consult Date(s): 02/08/2024    Consulting Provider(s): Annalise Cervantes    Assessment / Plan  HAP  - Vancomycin  Concurrent Antimicrobials: Cefepime  Day of Vanc Therapy / Ordered Duration: 7 days  Current Dosing Method: Bayesian-Guided AUC Dosing  Therapeutic Goal: -600 mg/L*hr  Current Dose / Plan:   2500 mg x1, followed by 750 mg every 12 hours  Est.  mg/L.hr  Est. Trough 16.5 mg/L  Will continue to monitor clinical condition and make adjustments to regimen as appropriate.    Thank you for consulting pharmacy,    Eduard Baron  PharmD Candidate 2024  Main Pharmacy: [phone]      Interval update:  ____________    Subjective/Objective:   Arpit Vinson is a 73 y.o. male with a PMH of ___________ who is admitted with Anemia.     Pharmacy is consulted to dose Vancomycin.    Ht Readings from Last 1 Encounters:   02/08/24 1.88 m (6' 2\")     Wt Readings from Last 1 Encounters:   02/08/24 103 kg (227 lb)     Current & Prior Antimicrobial Regimen(s):  Cefepime 2000 mg every 8 hours    Vancomycin Level(s) / Doses:    Date Time Dose Type of Level / Level Interpretation                 Note: Serum levels collected for AUC-based dosing may be high if collected in close proximity to the dose administered. This is not necessarily indicative of toxicity.    Cultures & Sensitivities:    Date Site Micro Susceptibility / Result                 Recent Labs     02/08/24 1717 02/08/24 1718   CREATININE  --  1.4*   BUN  --  45*   WBC 5.0  --        Estimated Creatinine Clearance: 61 mL/min (A) (based on SCr of 1.4 mg/dL (H)).    Additional Lab Values / Findings of Note:    Recent Labs     02/08/24 1718   PROCAL 0.17*     
[facility] -  Clinical Pharmacy Note    Vancomycin - Management by Pharmacy    Consult Date(s): 2/8/24  Consulting Provider(s): Dr PHUC Cervantes    Assessment / Plan  Pneumonia (HAP) Vancomycin  Concurrent Antimicrobials: Cefepime    (note: adjusted cefepime dosing today to 2g extended infusion every 12h as CrCl est now <60 ml/min)  Day of Vanc Therapy / Ordered Duration: Day 2 of 7  Current Dosing Method: AUC  -Bayesian dosing  Therapeutic Goal:  -600 mg/L*h  Pt with ROSANNA (SCr 1.4-->1.5-->1.6); baseline ~0.9.   UOP 3x unmeasured  Random level today = 24 mg/L (drawn ~8h after dose)  Will decrease to 1250 mg IV q24h given worsening renal dysfunction  Predicted AUC ~ 503 mg/L*hr and steady-state trough ~ 14.8 mg/L on this regimen   Repeat level tomorrow am, as renal function not stable   MRSA nares in process  Will continue to monitor clinical condition and make adjustments to regimen as appropriate.    Thank you for consulting pharmacy,    Please call with any questions    Ciro FunesD  PGY1 Pharmacy Resident  Phone: [phone]  Main Pharmacy: [id number]  2/10/2024 12:37 PM      Interval update:  Pt is afebrile and HDS. On O2 per NC    Subjective/Objective:   Arpit Vinson is a 73 y.o. male with a PMHx significant for tonsillar lung CA (on chemotherapy) , HLD, GERD,   who is admitted with pneumonia (HAP) .     Pharmacy is consulted to dose vancomycin .    Ht Readings from Last 1 Encounters:   02/08/24 1.88 m (6' 2\")     Wt Readings from Last 1 Encounters:   02/09/24 100 kg (220 lb 8 oz)     Current & Prior Antimicrobial Regimen(s):  Cefepime (2/8--current)  Vancomycin - pharmacy dosed  2500 mg IV x1 (2/9)  1500 mg IV q24h (2/9--2/10)  1250 mg IV q24h (2/10 - current)    Vancomycin Level(s) / Doses:    Date Time Dose Type of Level / Level Interpretation   2/9 1239 2500 mg x1 Random= 16.3 Start 1500 mg q24h for AUC/ssTr 476/13   2/10 0516 1500 mg q24h Random = 24 mg/L Decrease to 1250 mg q24h  , Tr 
Tube feed initiated at 45 ml/hr per order. Will continue to monitor.  
  Intake 1100 ml   Output 1200 ml   Net -100 ml       CONSTITUTIONAL: awake, alert, cooperative, no apparent distress   EYES: pupils equal, round and reactive to light, sclera clear and conjunctiva normal  ENT: Normocephalic, without obvious abnormality, atraumatic  NECK: supple, symmetrical, no jugular venous distension and no carotid bruits   HEMATOLOGIC/LYMPHATIC: no cervical, supraclavicular or axillary lymphadenopathy   LUNGS: no increased work of breathing and clear to auscultation   CARDIOVASCULAR: regular rate and rhythm, normal S1 and S2, no murmur noted  ABDOMEN: normal bowel sounds x 4, soft, non-distended, non-tender, no masses palpated, no hepatosplenomgaly   MUSCULOSKELETAL: full range of motion noted, tone is normal  NEUROLOGIC: awake, alert, oriented to name, place and time. Motor skills grossly intact.   SKIN: Normal skin color, texture, turgor and no jaundice. appears intact   EXTREMITIES: no LE edema     DATA:  CBC:    Recent Labs     02/12/24  0815 02/11/24  0726 02/10/24  0516 02/09/24  1738 02/09/24  0759 02/08/24  1717   WBC 2.5* 2.8* 2.8*  --  4.7 5.0   NEUTROABS 1.9 2.1 2.2  --   --  4.3   LYMPHOPCT 14.5 15.6 12.3  --   --  7.8   RBC 2.07* 2.46* 2.24*  --  2.01* 2.90*   HGB 6.7* 8.0* 7.4* 7.3* 6.5* 9.4*   HCT 18.9* 22.2* 21.0* 20.2* 18.4* 27.2*   MCV 91.5 90.1 93.7  --  91.3 93.7   MCH 32.5 32.4 33.2  --  32.5 32.4   MCHC 35.5 35.9 35.4  --  35.5 34.5   RDW 18.6* 19.0* 19.3*  --  20.1* 20.3*   PLT 72* 99* 93*  --  93* 134*       PT/INR:    Recent Labs     02/12/24  0815 02/10/24  0516 02/09/24  0759 02/08/24 1718 01/26/24  0455   PROT 6.3* 6.3* 6.0* 8.2 6.0*     PTT:  No results for input(s): \"APTT\" in the last 720 hours.    CMP:    Recent Labs     02/12/24  0815 02/11/24  0726 02/10/24  0516 02/09/24  0759 02/08/24  1718 01/26/24  0455 01/25/24  0622 01/24/24  0310   * 133* 134* 133* 137 134* 132* 131*   K 4.0 4.3 4.1 4.4 4.3 3.3* 3.4* 3.3*   CL 97* 97* 99 101 99 101 101 97*   CO2 
data filed at 2/9/2024 2018  Gross per 24 hour   Intake 240 ml   Output --   Net 240 ml       CONSTITUTIONAL: awake, alert, cooperative, no apparent distress   EYES: pupils equal, round and reactive to light, sclera clear and conjunctiva normal  ENT: Normocephalic, without obvious abnormality, atraumatic  NECK: supple, symmetrical, no jugular venous distension and no carotid bruits   HEMATOLOGIC/LYMPHATIC: no cervical, supraclavicular or axillary lymphadenopathy   LUNGS: no increased work of breathing and clear to auscultation   CARDIOVASCULAR: regular rate and rhythm, normal S1 and S2, no murmur noted  ABDOMEN: normal bowel sounds x 4, soft, non-distended, non-tender, no masses palpated, no hepatosplenomgaly   MUSCULOSKELETAL: full range of motion noted, tone is normal  NEUROLOGIC: awake, alert, oriented to name, place and time. Motor skills grossly intact.   SKIN: Normal skin color, texture, turgor and no jaundice. appears intact   EXTREMITIES: no LE edema     DATA:  CBC:    Recent Labs     02/09/24  1738 02/09/24  0759 02/08/24  1717 01/26/24  0455 01/25/24  1630 01/25/24  0622 01/24/24  1313 01/24/24  0310   WBC  --  4.7 5.0 1.7*  --  1.9*  --  1.9*   NEUTROABS  --   --  4.3 1.1*  --  1.3*  --  1.4*   LYMPHOPCT  --   --  7.8 19.7  --  20.1  --  15.0   RBC  --  2.01* 2.90* 2.29*  --  2.12*  --  1.54*   HGB 7.3* 6.5* 9.4* 7.3*   < > 6.7*   < > 5.1*   HCT 20.2* 18.4* 27.2* 21.0*   < > 19.4*   < > 14.4*   MCV  --  91.3 93.7 91.5  --  91.7  --  93.4   MCH  --  32.5 32.4 31.7  --  31.8  --  33.3   MCHC  --  35.5 34.5 34.7  --  34.7  --  35.7   RDW  --  20.1* 20.3* 18.8*  --  19.3*  --  20.1*   PLT  --  93* 134* 83*  --  80*  --  72*    < > = values in this interval not displayed.       PT/INR:    Recent Labs     02/10/24  0516 02/09/24  0759 02/08/24  1718 01/26/24  0455 01/25/24  0622   PROT 6.3* 6.0* 8.2 6.0* 6.1*     PTT:  No results for input(s): \"APTT\" in the last 720 hours.    CMP:    Recent Labs     
results for input(s): \"TROPONINI\" in the last 72 hours.  No results for input(s): \"BNP\" in the last 72 hours.  No results for input(s): \"CHOL\", \"HDL\" in the last 72 hours.    Invalid input(s): \"LDLCALCU\"  No results for input(s): \"INR\" in the last 72 hours.      Assessment & Plan:    73 y.o. male with tonsillar cancer s/p radiation, currently undergoing chemotherapy, gastroesophageal reflux disease, alcohol use disorder and hyperlipidemia, as well as recent admission for pneumonia 1/23/2024 to 1/26/2024, who presented to the ED, with 8 days history of shortness of breath and chills.     Right lung pneumonia, probable aspiration etiology: POA  -He is high risk for aspiration in view of his dysphagia from tonsillar cancer s/p radiation, as well as alcohol use history  -He has a feeding tube but admits to taking by mouth, for comfort  -Procalcitonin 0.17; utility not reliable in the setting of immunosuppression, early pneumonia  -Patient does not have systemic inflammatory response, probable in the setting of immunosuppression/chemotherapy  -Acute respiratory failure and sepsis: Ruled out, not present on admission.  Confirmed not present on admission  -Reviewed recent SLP evaluation and recommendation: Full liquids - will advance as pt tolerates - (pt reports not taking solids recently)  -Patient recently completed radiotherapy for his tonsillar cancer, now on chemo  -Will continue vancomycin and cefepime started on admission  -Check MRSA screen; may DC vancomycin if negative  -Keep on clears alone or n.p.o. 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

## 2024-02-13 NOTE — CARE COORDINATION
[facility] Transitions Initial Follow Up Call    Call within 2 business days of discharge: Yes    Patient:  MICHAELLE SINGER  Patient :  1950  MRN:  [id number]    Reason for Admission:  anemia / pneumonia   Discharge Date:  24   RARS: 24      Transitions of Care Initial Call    Was this an external facility discharge? no    Discharge Facility: [facility]      Challenges to be reviewed by the provider   Additional needs identified to be addressed with provider:    no    AMB CC Provider Discharge Needs: none            Non-face-to-face services provided:  NONE      1ST  CTC attempt to reach Pt regarding recent hospital discharge / unable to reach.     Follow up appointments:    Future Appointments   Date Time Provider Department Center   2024 10:00 AM Pavan Miller MD [facility]   2024  1:30 PM Castillo Phelan MD [facility]       MADELINE Goodrich, RN  Care Transition Coordinator  Contact Number:  [phone]

## 2024-02-13 NOTE — PLAN OF CARE
Problem: Discharge Planning  Goal: Discharge to home or other facility with appropriate resources  Outcome: Adequate for Discharge     Problem: Safety - Adult  Goal: Free from fall injury  2/12/2024 2007 by Rosy Vargas RN  Outcome: Adequate for Discharge     Problem: Pain  Goal: Verbalizes/displays adequate comfort level or baseline comfort level  2/12/2024 2007 by Rosy Vargas RN  Outcome: Adequate for Discharge

## 2024-02-14 ENCOUNTER — CARE COORDINATION (OUTPATIENT)
Dept: CASE MANAGEMENT | Age: 74
End: 2024-02-14

## 2024-02-14 NOTE — CARE COORDINATION
[facility] Transitions Initial Follow Up Call    Call within 2 business days of discharge: Yes    Patient:  MICHAELLE SINGER  Patient :  1950  MRN:  [id number]    Reason for Admission:  anemia / pneumonia   Discharge Date:  24   RARS: 24      Transitions of Care Initial Call    Was this an external facility discharge? no    Discharge Facility: [facility]      Challenges to be reviewed by the provider   Additional needs identified to be addressed with provider:    no    AMB CC Provider Discharge Needs: none            Non-face-to-face services provided:  Communication with home health agencies or other community services the patient is currently using-CTN spoke with Evy with [facility] who confirms receipt of ARNOLDO for pt.     2ND  CTC attempt to reach Pt regarding recent hospital discharge / unable to reach / CT episode closed.     Follow up appointments:    Future Appointments   Date Time Provider Department Center   2024 10:00 AM Pavan Miller MD [facility] [facility]   2024  1:30 PM Castillo Phelan MD [place] [facility]       MADELINE Goodrich, RN  Care Transition Coordinator  Contact Number:  [phone]

## 2024-02-16 ENCOUNTER — TELEPHONE (OUTPATIENT)
Dept: INTERNAL MEDICINE CLINIC | Age: 74
End: 2024-02-16

## 2024-02-16 NOTE — TELEPHONE ENCOUNTER
[phone] (home)   SPOKE WITH PATIENT HE WAS DOING WELL  Was offered a HOSP. FOLLOW UP , patient refused a hosp  follow up. Was already seen 2/7/24.

## 2024-02-19 LAB
FUNGUS BLD CULT: NORMAL
FUNGUS BLD CULT: NORMAL

## 2024-02-21 ENCOUNTER — TELEPHONE (OUTPATIENT)
Dept: ONCOLOGY | Age: 74
End: 2024-02-21

## 2024-02-21 NOTE — TELEPHONE ENCOUNTER
Oncology Nutrition Note:     Called pt to schedule nutrition follow up per NP request. Pt did complete all chemo and XRT.     Pt agreeable to appointment this Friday 2/23/24 at 11am.     Electronically signed by Faith Malone RD, LD on 2/21/2024 at 1:44 PM

## 2024-02-23 ENCOUNTER — HOSPITAL ENCOUNTER (OUTPATIENT)
Dept: ONCOLOGY | Age: 74
Setting detail: INFUSION SERIES
Discharge: HOME | End: 2024-02-23

## 2024-02-23 VITALS — BODY MASS INDEX: 28.31 KG/M2 | HEIGHT: 74 IN

## 2024-02-23 NOTE — PROGRESS NOTES
Oncology Nutrition Note    RECOMMENDATIONS:  Adjust CYCLIC TF via PEG: TwoCal HN (or equivalent); Decrease down from 6 to 4 cartons nightly- 118 ml/hr goal rate x 8 hours.   PO Hydration vs. Water flushes: Have discussed in detail about drinking PO fluids vs flushing fluids.   PO Fluid intake rec'd at 64 oz (4-16.5 oz bottles water) daily.   Continue to rec'd 200 ml water flush before/after CYCLIC EN.   ONS: Use BID - rec'd Ensure Complete twice daily to replace the 2 cartons of TF he has been asked to decrease.   PO Diet: Utilizing soft and easy to chew foods.   Encourage small/frequent attempts at PO foods. Rec'd every 3-4 hours.   Encourage frequent trials of foods.     Pt to see RD weekly - next appointment in 2 weeks- 3/8/24.     Nutritional summary:   Pt now s/p xrt x 3 weeks; completed last chemotx last week. Per [facility], continues to struggle w/adequate hydration but has also declined IVF at [facility]. Nutritionally pt improved as he is now taking additional PO- yogurt, ice cream, soups, PB, honey, tea. However quantity is not significant yet. He has lost additional weight since last RD visit. This is probably a combination of pt inconsistent feeding pattern and hospitalization 2/8-2/12 for pneumonia which likely contributed. He reports he is drinking between 32-64 oz water daily, but when asked about barriers to consistent PO intake of water, pt describes thickened saliva from foods making drinking water difficult. RD encouraged to drink PO fluids between meals and if he can not consistently maintain adequate hydration, again recommended flushing the difference via PEG, or opt for [facility] IVF. Pt also struggling with desire to eat/expectation of foods/taste and disappointment of the attempt. RD encouraged to continue trialing foods throughout his recovery, as taste and tolerance will continue to evolve. Again asked pt to decrease his TF from 6 to 4 cartons nightly and to drink 2- ONS daily to replace the TF. Expressed

## 2024-02-25 LAB
FUNGUS SPEC CULT: ABNORMAL
LOEFFLER MB STN SPEC: ABNORMAL
ORGANISM: ABNORMAL

## 2024-02-26 LAB
FUNGUS BLD CULT: NORMAL
FUNGUS BLD CULT: NORMAL

## 2024-02-28 ENCOUNTER — TELEPHONE (OUTPATIENT)
Dept: SURGERY | Age: 74
End: 2024-02-28

## 2024-02-28 NOTE — TELEPHONE ENCOUNTER
Patient stated he has drainage coming from tube site. It may have been from the cap coming open, but now the skin is all red and irritated. Informed patient that he needed to seen the doctor that requested the tube to be replaced or his PCP for the skin irritation. Patient stated that he has talked to everyone else and they told him to see the surgeon. Patient insisted on seeing MD.

## 2024-03-04 ENCOUNTER — OFFICE VISIT (OUTPATIENT)
Dept: ENT CLINIC | Age: 74
End: 2024-03-04
Payer: MEDICARE

## 2024-03-04 VITALS
HEART RATE: 82 BPM | WEIGHT: 219.4 LBS | TEMPERATURE: 97.7 F | BODY MASS INDEX: 28.16 KG/M2 | HEIGHT: 74 IN | SYSTOLIC BLOOD PRESSURE: 106 MMHG | DIASTOLIC BLOOD PRESSURE: 69 MMHG

## 2024-03-04 DIAGNOSIS — C09.9 CARCINOMA OF TONSIL (HCC): Primary | ICD-10-CM

## 2024-03-04 PROCEDURE — 1036F TOBACCO NON-USER: CPT | Performed by: OTOLARYNGOLOGY

## 2024-03-04 PROCEDURE — 99213 OFFICE O/P EST LOW 20 MIN: CPT | Performed by: OTOLARYNGOLOGY

## 2024-03-04 PROCEDURE — 1111F DSCHRG MED/CURRENT MED MERGE: CPT | Performed by: OTOLARYNGOLOGY

## 2024-03-04 PROCEDURE — G8427 DOCREV CUR MEDS BY ELIG CLIN: HCPCS | Performed by: OTOLARYNGOLOGY

## 2024-03-04 PROCEDURE — 3017F COLORECTAL CA SCREEN DOC REV: CPT | Performed by: OTOLARYNGOLOGY

## 2024-03-04 PROCEDURE — G8417 CALC BMI ABV UP PARAM F/U: HCPCS | Performed by: OTOLARYNGOLOGY

## 2024-03-04 PROCEDURE — 3074F SYST BP LT 130 MM HG: CPT | Performed by: OTOLARYNGOLOGY

## 2024-03-04 PROCEDURE — 1123F ACP DISCUSS/DSCN MKR DOCD: CPT | Performed by: OTOLARYNGOLOGY

## 2024-03-04 PROCEDURE — G8484 FLU IMMUNIZE NO ADMIN: HCPCS | Performed by: OTOLARYNGOLOGY

## 2024-03-04 PROCEDURE — 3078F DIAST BP <80 MM HG: CPT | Performed by: OTOLARYNGOLOGY

## 2024-03-04 NOTE — PROGRESS NOTES
external auditory canals.  TUNING FORKS:  Rinne ++ Navarro midline at 512 Hz  INTRANASAL:  Septum midline, turbinates normal, meati clear.  LIPS, TEETH, GINGIVA:  Normal mucosa  PHARYNX: Specifically there no lesions noted in the right tonsil fossa.  On palpation the tonsil fossa and tongue base are free of disease.  NECK: No palpable neck masses today  LYMPH NODES: No palpable cervical lymphadenopathy.  SALIVARY GLANDS: Parotid and submandibular glands normal.  THYROID: No goiter or thyroid nodules palpable.    IMPRESSION: Squamous cell carcinoma of the right tonsil with metastases to the right neck which is responded well to chemoradiation.    PLAN: I have reassured the patient that he is doing well.  I will see him back in 3 months.  He is rehabilitating his swallow well.  I have given him information about speech and language pathology services and if he feels that he needs help with with his swallowing we will make referral.    FOLLOW-UP: 3 months.

## 2024-03-08 ENCOUNTER — HOSPITAL ENCOUNTER (OUTPATIENT)
Dept: ONCOLOGY | Age: 74
Setting detail: INFUSION SERIES
Discharge: HOME | End: 2024-03-08

## 2024-03-08 VITALS — BODY MASS INDEX: 28.17 KG/M2 | HEIGHT: 74 IN

## 2024-03-08 NOTE — PROGRESS NOTES
109.8 kg (242 lb)   Weight Changes +1 lb in last 2 weeks; total net loss -17 lb      BMI: 29.1    Wt Readings from Last 10 Encounters:   03/04/24 99.5 kg (219 lb 6.4 oz)   02/09/24 100 kg (220 lb 8 oz)   02/07/24 103 kg (227 lb)   01/26/24 102.3 kg (225 lb 8.5 oz)   01/15/24 106.7 kg (235 lb 3.2 oz)   12/08/23 107.4 kg (236 lb 12.4 oz)   12/01/23 108.9 kg (240 lb)   11/09/23 108.9 kg (240 lb 1.3 oz)   11/06/23 110.2 kg (243 lb)   11/03/23 110.2 kg (243 lb)       COMPARATIVE STANDARDS  Energy (kcal):  6228-6137     Protein (g):         Fluid (ml/day):  6429-7895    Consult dietitian if nutrition interventions essential to patient care is needed.     Faith Malone RD, LD

## 2024-03-12 RX ORDER — FUROSEMIDE 20 MG/1
TABLET ORAL
Qty: 30 TABLET | Refills: 2 | Status: SHIPPED | OUTPATIENT
Start: 2024-03-12

## 2024-03-19 ENCOUNTER — HOSPITAL ENCOUNTER (OUTPATIENT)
Dept: ONCOLOGY | Age: 74
Setting detail: INFUSION SERIES
Discharge: HOME | End: 2024-03-19
Payer: MEDICARE

## 2024-03-19 DIAGNOSIS — D64.9 ANEMIA, UNSPECIFIED TYPE: Primary | ICD-10-CM

## 2024-03-19 LAB
ABO + RH BLD: NORMAL
BLD GP AB SCN SERPL QL: NORMAL

## 2024-03-19 PROCEDURE — 86850 RBC ANTIBODY SCREEN: CPT

## 2024-03-19 PROCEDURE — 86923 COMPATIBILITY TEST ELECTRIC: CPT

## 2024-03-19 PROCEDURE — 86900 BLOOD TYPING SEROLOGIC ABO: CPT

## 2024-03-19 PROCEDURE — P9016 RBC LEUKOCYTES REDUCED: HCPCS

## 2024-03-19 PROCEDURE — 86901 BLOOD TYPING SEROLOGIC RH(D): CPT

## 2024-03-19 RX ORDER — SODIUM CHLORIDE 9 MG/ML
20 INJECTION, SOLUTION INTRAVENOUS CONTINUOUS
OUTPATIENT
Start: 2024-03-19

## 2024-03-19 RX ORDER — SODIUM CHLORIDE 0.9 % (FLUSH) 0.9 %
5-40 SYRINGE (ML) INJECTION PRN
Status: DISCONTINUED | OUTPATIENT
Start: 2024-03-19 | End: 2024-03-20 | Stop reason: HOSPADM

## 2024-03-19 RX ORDER — ALBUTEROL SULFATE 90 UG/1
4 AEROSOL, METERED RESPIRATORY (INHALATION) PRN
OUTPATIENT
Start: 2024-03-19

## 2024-03-19 RX ORDER — SODIUM CHLORIDE 0.9 % (FLUSH) 0.9 %
5-40 SYRINGE (ML) INJECTION PRN
Status: CANCELLED | OUTPATIENT
Start: 2024-03-19

## 2024-03-19 RX ORDER — ONDANSETRON 2 MG/ML
8 INJECTION INTRAMUSCULAR; INTRAVENOUS
OUTPATIENT
Start: 2024-03-19

## 2024-03-19 RX ORDER — SODIUM CHLORIDE 9 MG/ML
25 INJECTION, SOLUTION INTRAVENOUS PRN
Status: DISCONTINUED | OUTPATIENT
Start: 2024-03-19 | End: 2024-03-20 | Stop reason: HOSPADM

## 2024-03-19 RX ORDER — SODIUM CHLORIDE 9 MG/ML
INJECTION, SOLUTION INTRAVENOUS CONTINUOUS
OUTPATIENT
Start: 2024-03-19

## 2024-03-19 RX ORDER — ACETAMINOPHEN 325 MG/1
650 TABLET ORAL
OUTPATIENT
Start: 2024-03-19

## 2024-03-19 RX ORDER — DIPHENHYDRAMINE HYDROCHLORIDE 50 MG/ML
50 INJECTION INTRAMUSCULAR; INTRAVENOUS
OUTPATIENT
Start: 2024-03-19

## 2024-03-19 RX ORDER — SODIUM CHLORIDE 9 MG/ML
25 INJECTION, SOLUTION INTRAVENOUS PRN
Status: CANCELLED | OUTPATIENT
Start: 2024-03-19

## 2024-03-19 RX ORDER — EPINEPHRINE 1 MG/ML
0.3 INJECTION, SOLUTION INTRAMUSCULAR; SUBCUTANEOUS PRN
OUTPATIENT
Start: 2024-03-19

## 2024-03-19 NOTE — PROGRESS NOTES
Type and screen drawn for blood transfusion scheduled for 3/20/2024.  Pt verbalized understanding.  Discharged ambulatory to his home.    Leslie Dykes RN

## 2024-03-20 ENCOUNTER — HOSPITAL ENCOUNTER (OUTPATIENT)
Dept: ONCOLOGY | Age: 74
Setting detail: INFUSION SERIES
Discharge: HOME | End: 2024-03-20
Payer: MEDICARE

## 2024-03-20 VITALS
SYSTOLIC BLOOD PRESSURE: 113 MMHG | DIASTOLIC BLOOD PRESSURE: 64 MMHG | TEMPERATURE: 97.9 F | HEART RATE: 68 BPM | OXYGEN SATURATION: 100 % | RESPIRATION RATE: 18 BRPM

## 2024-03-20 DIAGNOSIS — D64.9 ANEMIA, UNSPECIFIED TYPE: Primary | ICD-10-CM

## 2024-03-20 LAB
BLOOD BANK DISPENSE STATUS: NORMAL
BLOOD BANK PRODUCT CODE: NORMAL
BPU ID: NORMAL
DESCRIPTION BLOOD BANK: NORMAL

## 2024-03-20 PROCEDURE — 36430 TRANSFUSION BLD/BLD COMPNT: CPT

## 2024-03-20 RX ORDER — SODIUM CHLORIDE 9 MG/ML
20 INJECTION, SOLUTION INTRAVENOUS CONTINUOUS
OUTPATIENT
Start: 2024-03-20

## 2024-03-20 RX ORDER — EPINEPHRINE 1 MG/ML
0.3 INJECTION, SOLUTION INTRAMUSCULAR; SUBCUTANEOUS PRN
OUTPATIENT
Start: 2024-03-20

## 2024-03-20 RX ORDER — SODIUM CHLORIDE 9 MG/ML
INJECTION, SOLUTION INTRAVENOUS CONTINUOUS
OUTPATIENT
Start: 2024-03-20

## 2024-03-20 RX ORDER — ACETAMINOPHEN 325 MG/1
650 TABLET ORAL
OUTPATIENT
Start: 2024-03-20

## 2024-03-20 RX ORDER — ONDANSETRON 2 MG/ML
8 INJECTION INTRAMUSCULAR; INTRAVENOUS
OUTPATIENT
Start: 2024-03-20

## 2024-03-20 RX ORDER — ALBUTEROL SULFATE 90 UG/1
4 AEROSOL, METERED RESPIRATORY (INHALATION) PRN
OUTPATIENT
Start: 2024-03-20

## 2024-03-20 RX ORDER — DIPHENHYDRAMINE HYDROCHLORIDE 50 MG/ML
50 INJECTION INTRAMUSCULAR; INTRAVENOUS
OUTPATIENT
Start: 2024-03-20

## 2024-03-22 ENCOUNTER — HOSPITAL ENCOUNTER (OUTPATIENT)
Dept: ONCOLOGY | Age: 74
Setting detail: INFUSION SERIES
Discharge: HOME | End: 2024-03-22

## 2024-03-26 ENCOUNTER — HOSPITAL ENCOUNTER (OUTPATIENT)
Dept: ONCOLOGY | Age: 74
Setting detail: INFUSION SERIES
Discharge: HOME | End: 2024-03-26
Payer: MEDICARE

## 2024-03-26 VITALS
SYSTOLIC BLOOD PRESSURE: 114 MMHG | RESPIRATION RATE: 16 BRPM | TEMPERATURE: 98.1 F | OXYGEN SATURATION: 99 % | DIASTOLIC BLOOD PRESSURE: 69 MMHG | HEART RATE: 70 BPM

## 2024-03-26 DIAGNOSIS — D64.9 ANEMIA, UNSPECIFIED TYPE: Primary | ICD-10-CM

## 2024-03-26 PROCEDURE — 36430 TRANSFUSION BLD/BLD COMPNT: CPT

## 2024-03-26 PROCEDURE — P9016 RBC LEUKOCYTES REDUCED: HCPCS

## 2024-03-26 PROCEDURE — 86901 BLOOD TYPING SEROLOGIC RH(D): CPT

## 2024-03-26 PROCEDURE — 86900 BLOOD TYPING SEROLOGIC ABO: CPT

## 2024-03-26 PROCEDURE — 86850 RBC ANTIBODY SCREEN: CPT

## 2024-03-26 PROCEDURE — 86923 COMPATIBILITY TEST ELECTRIC: CPT

## 2024-03-26 RX ORDER — EPINEPHRINE 1 MG/ML
0.3 INJECTION, SOLUTION INTRAMUSCULAR; SUBCUTANEOUS PRN
OUTPATIENT
Start: 2024-03-26

## 2024-03-26 RX ORDER — SODIUM CHLORIDE 9 MG/ML
20 INJECTION, SOLUTION INTRAVENOUS CONTINUOUS
OUTPATIENT
Start: 2024-03-26

## 2024-03-26 RX ORDER — ALBUTEROL SULFATE 90 UG/1
4 AEROSOL, METERED RESPIRATORY (INHALATION) PRN
OUTPATIENT
Start: 2024-03-26

## 2024-03-26 RX ORDER — SODIUM CHLORIDE 0.9 % (FLUSH) 0.9 %
5-40 SYRINGE (ML) INJECTION PRN
OUTPATIENT
Start: 2024-03-26

## 2024-03-26 RX ORDER — SODIUM CHLORIDE 0.9 % (FLUSH) 0.9 %
5-40 SYRINGE (ML) INJECTION PRN
Status: DISCONTINUED | OUTPATIENT
Start: 2024-03-26 | End: 2024-03-27 | Stop reason: HOSPADM

## 2024-03-26 RX ORDER — ACETAMINOPHEN 325 MG/1
650 TABLET ORAL
OUTPATIENT
Start: 2024-03-26

## 2024-03-26 RX ORDER — SODIUM CHLORIDE 9 MG/ML
INJECTION, SOLUTION INTRAVENOUS CONTINUOUS
OUTPATIENT
Start: 2024-03-26

## 2024-03-26 RX ORDER — ONDANSETRON 2 MG/ML
8 INJECTION INTRAMUSCULAR; INTRAVENOUS
OUTPATIENT
Start: 2024-03-26

## 2024-03-26 RX ORDER — DIPHENHYDRAMINE HYDROCHLORIDE 50 MG/ML
50 INJECTION INTRAMUSCULAR; INTRAVENOUS
OUTPATIENT
Start: 2024-03-26

## 2024-03-26 NOTE — PROGRESS NOTES
Patient's hemoglobin this AM:  7.5  Patient's platelet count this AM: 130  Pt seen at [facility] today for  Packed red blood cell transfusion  for above lab values per order. Informed consent verified. No Pre-medications ordered with no previous transfusion reaction history. Transfused per policy. Pt tolerated transfusion well and without incident.  Pt verbalizes understanding of discharge instructions.  Discharged to his home.    Leslie Dykes RN

## 2024-03-26 NOTE — FLOWSHEET NOTE
03/26/24 1508   AVS Reviewed   AVS & discharge instructions reviewed with patient and/or representative? Yes   Reviewed instructions with Patient   Level of Understanding Questions answered;Teach back completed;Verbalized understanding     Leslie Dykes RN

## 2024-04-03 DIAGNOSIS — M1A.0720 CHRONIC GOUT OF LEFT FOOT, UNSPECIFIED CAUSE: ICD-10-CM

## 2024-04-03 DIAGNOSIS — M79.89 PAIN AND SWELLING OF LOWER EXTREMITY, UNSPECIFIED LATERALITY: ICD-10-CM

## 2024-04-03 DIAGNOSIS — M79.606 PAIN AND SWELLING OF LOWER EXTREMITY, UNSPECIFIED LATERALITY: ICD-10-CM

## 2024-04-03 DIAGNOSIS — M1A.0710 CHRONIC GOUT OF RIGHT FOOT, UNSPECIFIED CAUSE: ICD-10-CM

## 2024-04-03 RX ORDER — ALLOPURINOL 300 MG/1
300 TABLET ORAL DAILY
Qty: 90 TABLET | Refills: 1 | Status: SHIPPED | OUTPATIENT
Start: 2024-04-03

## 2024-04-03 RX ORDER — SPIRONOLACTONE 25 MG/1
25 TABLET ORAL DAILY
Qty: 90 TABLET | Refills: 1 | Status: SHIPPED | OUTPATIENT
Start: 2024-04-03

## 2024-04-11 ENCOUNTER — TELEPHONE (OUTPATIENT)
Dept: CARDIOLOGY CLINIC | Age: 74
End: 2024-04-11

## 2024-04-11 NOTE — TELEPHONE ENCOUNTER
A follow up call was placed to the patient because he missed his appointment today 04/11/24 with Dr. Garibay. The patient was rescheduled by our office from 04/22/24  to 04/11/24 and a voicemail was left regarding this change and the patient never received the voicemail because our office number displays as spam on his phone. The patient would like to be rescheduled sooner than later because he was unaware of the change. I have no openings at this time for Dr. Garibay, please advise. Thanks.    The patient can be reached at [phone] with any further questions.

## 2024-04-15 NOTE — PROGRESS NOTES
episode lasting 12.2 hr and max rate of 140, 11 runs of AT lasting up to 19 beats, <1% PAC, <1% PVC, no reported symptoms    2. Echo 12/8/23:    Summary   Suboptimal image quality. Definity contrast administered.   Left ventricular cavity size is normal. Normal left ventricular wall   thickness.   Overall left ventricular systolic function appears normal.   Ejection fraction is visually estimated to be 60%.   No regional wall motion abnormalities are noted.   indeterminate diastolic function.   he ascending aorta is borderline dilated measuring at 4.0cm.    The MCOT, echocardiogram, stress test, and coronary angiography/PCI were reviewed by myself and used for my plan of care.     - The patient is counseled to follow a low salt diet to assure blood pressure remains controlled for cardiovascular risk factor modification.   - The patient is counseled to avoid excess caffeine, and energy drinks as this may exacerbated ectopy and arrhythmia.   - The patient is counseled to get regular exercise 3-5 times per week to control cardiovascular risk factors.   - The patient is counseled to lose weigt to control cardiovascular risk factors.  -The patient is counseled about the health hazards of smoking including cardiovascular side effects and its impact on morbidity and mortality.      Thank you for allowing me to participate in the care of Arpit Vinson. All questions and concerns were addressed to the patient/family. Alternatives to my treatment were discussed.     I, Oanh Parsons RN, am scribing for and in the presence of Dr. Pavan Miller. 04/16/24 10:25 AM  KELLEN Martinez Uma Lakshmanadoss MD, personally performed the services prescribed in this documentation as scribed by Ms. Oanh Parsons RN in my presence and it is both accurate and complete.     Rossana Miller MD  Cardiac Electrophysiology  [facility]

## 2024-04-16 ENCOUNTER — OFFICE VISIT (OUTPATIENT)
Dept: CARDIOLOGY CLINIC | Age: 74
End: 2024-04-16
Payer: MEDICARE

## 2024-04-16 VITALS
WEIGHT: 232 LBS | DIASTOLIC BLOOD PRESSURE: 74 MMHG | SYSTOLIC BLOOD PRESSURE: 100 MMHG | HEART RATE: 78 BPM | BODY MASS INDEX: 29.79 KG/M2

## 2024-04-16 DIAGNOSIS — I48.0 PAROXYSMAL ATRIAL FIBRILLATION (HCC): Primary | ICD-10-CM

## 2024-04-16 DIAGNOSIS — E78.2 MIXED HYPERLIPIDEMIA: ICD-10-CM

## 2024-04-16 DIAGNOSIS — I10 PRIMARY HYPERTENSION: ICD-10-CM

## 2024-04-16 PROCEDURE — 1036F TOBACCO NON-USER: CPT | Performed by: INTERNAL MEDICINE

## 2024-04-16 PROCEDURE — 3017F COLORECTAL CA SCREEN DOC REV: CPT | Performed by: INTERNAL MEDICINE

## 2024-04-16 PROCEDURE — 1123F ACP DISCUSS/DSCN MKR DOCD: CPT | Performed by: INTERNAL MEDICINE

## 2024-04-16 PROCEDURE — 93000 ELECTROCARDIOGRAM COMPLETE: CPT | Performed by: INTERNAL MEDICINE

## 2024-04-16 PROCEDURE — 3074F SYST BP LT 130 MM HG: CPT | Performed by: INTERNAL MEDICINE

## 2024-04-16 PROCEDURE — G8427 DOCREV CUR MEDS BY ELIG CLIN: HCPCS | Performed by: INTERNAL MEDICINE

## 2024-04-16 PROCEDURE — 99204 OFFICE O/P NEW MOD 45 MIN: CPT | Performed by: INTERNAL MEDICINE

## 2024-04-16 PROCEDURE — 3078F DIAST BP <80 MM HG: CPT | Performed by: INTERNAL MEDICINE

## 2024-04-16 PROCEDURE — G8417 CALC BMI ABV UP PARAM F/U: HCPCS | Performed by: INTERNAL MEDICINE

## 2024-05-30 ENCOUNTER — HOSPITAL ENCOUNTER (OUTPATIENT)
Age: 74
Setting detail: OUTPATIENT SURGERY
Discharge: HOME | End: 2024-05-30
Attending: INTERNAL MEDICINE | Admitting: INTERNAL MEDICINE
Payer: MEDICARE

## 2024-05-30 ENCOUNTER — ANESTHESIA EVENT (OUTPATIENT)
Dept: ENDOSCOPY | Age: 74
End: 2024-05-30
Payer: MEDICARE

## 2024-05-30 ENCOUNTER — ANESTHESIA (OUTPATIENT)
Dept: ENDOSCOPY | Age: 74
End: 2024-05-30
Payer: MEDICARE

## 2024-05-30 VITALS
WEIGHT: 230 LBS | OXYGEN SATURATION: 100 % | SYSTOLIC BLOOD PRESSURE: 113 MMHG | TEMPERATURE: 97 F | HEIGHT: 74 IN | DIASTOLIC BLOOD PRESSURE: 80 MMHG | RESPIRATION RATE: 16 BRPM | BODY MASS INDEX: 29.52 KG/M2 | HEART RATE: 56 BPM

## 2024-05-30 DIAGNOSIS — Z86.010 HISTORY OF COLON POLYPS: ICD-10-CM

## 2024-05-30 PROCEDURE — 6360000002 HC RX W HCPCS: Performed by: NURSE ANESTHETIST, CERTIFIED REGISTERED

## 2024-05-30 PROCEDURE — 3700000001 HC ADD 15 MINUTES (ANESTHESIA): Performed by: INTERNAL MEDICINE

## 2024-05-30 PROCEDURE — 3700000000 HC ANESTHESIA ATTENDED CARE: Performed by: INTERNAL MEDICINE

## 2024-05-30 PROCEDURE — 7100000010 HC PHASE II RECOVERY - FIRST 15 MIN: Performed by: INTERNAL MEDICINE

## 2024-05-30 PROCEDURE — 2709999900 HC NON-CHARGEABLE SUPPLY: Performed by: INTERNAL MEDICINE

## 2024-05-30 PROCEDURE — 2580000003 HC RX 258: Performed by: ANESTHESIOLOGY

## 2024-05-30 PROCEDURE — 3609010600 HC COLONOSCOPY POLYPECTOMY SNARE/COLD BIOPSY: Performed by: INTERNAL MEDICINE

## 2024-05-30 PROCEDURE — 88305 TISSUE EXAM BY PATHOLOGIST: CPT

## 2024-05-30 PROCEDURE — 7100000011 HC PHASE II RECOVERY - ADDTL 15 MIN: Performed by: INTERNAL MEDICINE

## 2024-05-30 RX ORDER — LIDOCAINE HYDROCHLORIDE 20 MG/ML
INJECTION, SOLUTION INTRAVENOUS PRN
Status: DISCONTINUED | OUTPATIENT
Start: 2024-05-30 | End: 2024-05-30 | Stop reason: SDUPTHER

## 2024-05-30 RX ORDER — PROPOFOL 10 MG/ML
INJECTION, EMULSION INTRAVENOUS PRN
Status: DISCONTINUED | OUTPATIENT
Start: 2024-05-30 | End: 2024-05-30 | Stop reason: SDUPTHER

## 2024-05-30 RX ORDER — SODIUM CHLORIDE, SODIUM LACTATE, POTASSIUM CHLORIDE, CALCIUM CHLORIDE 600; 310; 30; 20 MG/100ML; MG/100ML; MG/100ML; MG/100ML
INJECTION, SOLUTION INTRAVENOUS CONTINUOUS
Status: DISCONTINUED | OUTPATIENT
Start: 2024-05-30 | End: 2024-05-30 | Stop reason: HOSPADM

## 2024-05-30 RX ADMIN — PROPOFOL 40 MG: 10 INJECTION, EMULSION INTRAVENOUS at 12:01

## 2024-05-30 RX ADMIN — SODIUM CHLORIDE, SODIUM LACTATE, POTASSIUM CHLORIDE, AND CALCIUM CHLORIDE: .6; .31; .03; .02 INJECTION, SOLUTION INTRAVENOUS at 11:40

## 2024-05-30 RX ADMIN — PROPOFOL 20 MG: 10 INJECTION, EMULSION INTRAVENOUS at 12:03

## 2024-05-30 RX ADMIN — PROPOFOL 40 MG: 10 INJECTION, EMULSION INTRAVENOUS at 11:59

## 2024-05-30 RX ADMIN — PROPOFOL 100 MG: 10 INJECTION, EMULSION INTRAVENOUS at 11:52

## 2024-05-30 RX ADMIN — LIDOCAINE HYDROCHLORIDE 50 MG: 20 INJECTION, SOLUTION INTRAVENOUS at 11:52

## 2024-05-30 ASSESSMENT — LIFESTYLE VARIABLES: SMOKING_STATUS: 1

## 2024-05-30 ASSESSMENT — PAIN SCALES - GENERAL: PAINLEVEL_OUTOF10: 0

## 2024-05-30 NOTE — DISCHARGE INSTRUCTIONS
Endoscopy Discharge Instructions      Call the physician that did your procedure for any questions or concerns.                           DR. MICHELLE                      [phone]               You may be drowsy or lightheaded after receiving sedation.  DO NOT operate  a vehicle (automobile, bicycle, motorcycle, machinery, or power tools), no  alcoholic beverages, and do not make any important decisions today.                 Plan on bed rest or quiet relaxation today.  Resume normal activities in the morning.           Eat a light first meal, avoiding spicy and fatty foods, then resume normal diet unless you are told otherwise by your physician.    If the intravenous medication site is painful, apply warm compresses on the site until the soreness is relieved and elevate the arm above the heart. Call your physician if no improvement  in 2-3 days.       POSSIBLE SYMPTOMS TO WATCH:     1. fever (greater than 100) 5. increased abdominal bloating   2. severe pain   6. excessive bleeding   3. nausea and vomiting  7. chest pain   4. chills    8. shortness of breath    Remove dressing tomorrow, then ok to shower. No solid food for 4 hours. No bath or swimming until completely healed.  Notify your physician if these problems occur     Expected as normal and remedies:  Sore throat: use over the counter throat lozenges or gargle with warm salt water.  Gaseous discomfort: belching or passing flatus (gas).                    will call with biopsy results in :_____1 week___                                                  We want to thank you for choosing the [facility] as your health care provider. We hope that you received excellent care while you were here. You may receive a survey in the mail regarding your care. We would appreciate you taking a  few minutes of your time to complete this survey. Again, thank you for choosing the [facility].                 Please review these discharge

## 2024-05-30 NOTE — ANESTHESIA PRE PROCEDURE
Department of Anesthesiology  Preprocedure Note       Name:  Arpit Vinson   Age:  73 y.o.  :  1950                                          MRN:  [id number]         Date:  2024      Surgeon: Surgeon(s):  Vin Barros MD    Procedure: Procedure(s):  COLONOSCOPY    Medications prior to admission:   Prior to Admission medications    Medication Sig Start Date End Date Taking? Authorizing Provider   apixaban (ELIQUIS) 5 MG TABS tablet Take 1 tablet by mouth 2 times daily 24   Pavan Miller MD   spironolactone (ALDACTONE) 25 MG tablet TAKE 1 TABLET BY MOUTH DAILY  Patient not taking: Reported on 2024 4/3/24   Sissy Evangelista MD   allopurinol (ZYLOPRIM) 300 MG tablet TAKE 1 TABLET BY MOUTH DAILY  Patient not taking: Reported on 2024 4/3/24   Sissy Evangelista MD   furosemide (LASIX) 20 MG tablet TAKE 1 TABLET BY MOUTH DAILY AS NEEDED FOR LEG SWELLING 3/12/24   Sissy Evangelista MD   oxyCODONE (ROXICODONE) 5 MG/5ML solution Take by mouth every 4 hours as needed for Pain.  Patient not taking: Reported on 2024    Kraig Bernard MD   atenolol (TENORMIN) 50 MG tablet Take 0.5 tablets by mouth daily 24   Kaylin Adams MD   lansoprazole 3 MG/ML SUSP Take 5 mLs by mouth 2 times daily (before meals) 24  Kaylin Adams MD   atorvastatin (LIPITOR) 40 MG tablet TAKE 1 TABLET BY MOUTH DAILY 24   Sissy Evangelista MD   diclofenac sodium (VOLTAREN) 1 % GEL Apply topically 2 times daily    Kraig Bernard MD   Ginkgo Biloba 40 MG TABS Take by mouth    Kraig Bernard MD   Milk Thistle 500 MG CAPS Take by mouth    Kraig Bernard MD       Current medications:    Current Facility-Administered Medications   Medication Dose Route Frequency Provider Last Rate Last Admin    lactated ringers IV soln infusion   IntraVENous Continuous Ashwin Merchant MD           Allergies:    No Known Allergies      Problem List:    Patient Active Problem

## 2024-05-30 NOTE — OP NOTE
[facility] [place]  PEG Tube Removal Note              Patient: Arpit Vinson  : 1950     Date:  2024    Surgeon:  Vin Barros MD, MD    Procedure: Bedside PEG tube removal       Indications:  Indwelling PEG tube, not currently being used.    Procedure:   An informed consent was obtained from the patient after explanation of indications, benefits, possible risks and complications of the procedure.  The procedure was done at bedside, placed in supine position.    The PEG site was inspected and it looked healthy. No tenderness noted.  The tube was pulled out with one quick pull. It came out easily with no significant bleeding. The area was cleaned with alcohol and standard dressing was applied.     The patient tolerated the procedure well.    Estimated Blood Loss: None      Impression:    Successful PEG tube removal    Recommendations:    NPO for 6 hours.  Keep the area dry until tomorrow.    Vin Barros MD, MD   [facility] [place]  2024

## 2024-05-30 NOTE — PROGRESS NOTES
Ambulatory Surgery/Procedure Discharge Note    Vitals:    05/30/24 1240   BP: 113/80   Pulse: 56   Resp: 16   Temp: 97 °F (36.1 °C)   SpO2: 100%       No intake/output data recorded.    Restroom use offered before discharge.  Yes    Pain assessment:  none Denies pain or nausea, tolerating water well. Abdominal dressing clean dry and intact, no drainage.     Dr Banuelos in to talk to pt and wife.      Patient discharged to home/self care. Patient discharged via wheel chair by transporter to waiting family/S.O.       5/30/2024 12:54 PM

## 2024-05-30 NOTE — ANESTHESIA POSTPROCEDURE EVALUATION
Department of Anesthesiology  Postprocedure Note    Patient: Arpit Vinson  MRN: [id number]  YOB: 1950  Date of evaluation: 5/30/2024    Procedure Summary       Date: 05/30/24 Room / Location: [place] [id number] / [facility]    Anesthesia Start: 1147 Anesthesia Stop: 1211    Procedure: COLONOSCOPY POLYPECTOMY SNARE BIOPSY PERCUTANEOUS ENDOSCOPIC GASTROSTOMY TUBE REMOVAL Diagnosis:       History of colon polyps      (History of colon polyps [Z86.010])    Surgeons: Vin Barros MD Responsible Provider: Anna Arriola DO    Anesthesia Type: MAC ASA Status: 4            Anesthesia Type: No value filed.    Jose Phase I: Jose Score: 10    Jose Phase II: Jose Score: 10    Anesthesia Post Evaluation    Patient location during evaluation: PACU  Patient participation: complete - patient participated  Level of consciousness: awake and alert  Airway patency: patent  Nausea & Vomiting: no nausea and no vomiting  Cardiovascular status: blood pressure returned to baseline  Respiratory status: acceptable  Hydration status: euvolemic  Pain management: adequate    No notable events documented.

## 2024-05-30 NOTE — H&P
History and Physical / Pre-Sedation Assessment      PMHx:   Past Medical History:   Diagnosis Date    Arthritis     Cancer (HCC)     Dental disease     Dizziness     Hepatitis C     Treated    History of meniscal tear     Right knee     Hyperlipidemia     Hypertension     Impaired fasting glucose     Tinnitus     TMJ dysfunction     Tonsillar mass     right       Medications:   Prior to Admission medications    Medication Sig Start Date End Date Taking? Authorizing Provider   apixaban (ELIQUIS) 5 MG TABS tablet Take 1 tablet by mouth 2 times daily 4/16/24   Pavan Miller MD   spironolactone (ALDACTONE) 25 MG tablet TAKE 1 TABLET BY MOUTH DAILY 4/3/24   Sissy Evangelista MD   allopurinol (ZYLOPRIM) 300 MG tablet TAKE 1 TABLET BY MOUTH DAILY  Patient not taking: Reported on 4/16/2024 4/3/24   Sissy Evangelista MD   furosemide (LASIX) 20 MG tablet TAKE 1 TABLET BY MOUTH DAILY AS NEEDED FOR LEG SWELLING  Patient not taking: Reported on 5/30/2024 3/12/24   Sissy Evangelista MD   atenolol (TENORMIN) 50 MG tablet Take 0.5 tablets by mouth daily 1/26/24   Kaylin Adams MD   lansoprazole 3 MG/ML SUSP Take 5 mLs by mouth 2 times daily (before meals) 1/26/24 4/25/24  Kaylin Adams MD   atorvastatin (LIPITOR) 40 MG tablet TAKE 1 TABLET BY MOUTH DAILY 1/24/24   Sissy Evangelista MD   diclofenac sodium (VOLTAREN) 1 % GEL Apply topically 2 times daily    Kraig Bernard MD   Ginkgo Biloba 40 MG TABS Take by mouth    Kraig Bernard MD   Milk Thistle 500 MG CAPS Take by mouth    Kraig Bernard MD       Allergies: No Known Allergies    PSHx:   Past Surgical History:   Procedure Laterality Date    CATARACT EXTRACTION Bilateral     COLONOSCOPY  Aug., 2014 ( 2017 )    Dr. Tatum - tubular adenomas ( 3 )    COLONOSCOPY  *Nov.26, 2019 ( 2024 )    Dr. Banuelos - tubular adenoma    GASTROSTOMY TUBE PLACEMENT N/A 12/07/2023    PERCUTANEOUS ENDOSCOPIC GASTROSTOMY TUBE INSERTION performed by Maldonado Weaver MD at [facility]

## 2024-05-30 NOTE — OP NOTE
[facility] [place]  Colonoscopy Note            Patient: Arpit Vinson  : 1950     Procedure: Colonoscopy with polypectomy    Date:  2024    Surgeon:  Vin Barros MD, MD    Anesthesia:  MAC    Indications: History of colon polyps    Procedure:   An informed consent was obtained from the patient after explanation of indications, benefits, possible risks and complications of the procedure.  The patient was then taken to the endoscopy suite, placed in the left lateral decubitus position, and the above IV anesthesia was administered.    A digital rectal examination was performed and revealed negative without mass, lesions or tenderness.      The Olympus CFQ-180-AL video colonoscope was placed in the patient's rectum under digital direction and advanced to the cecum. The cecum was identified by characteristic anatomy and ballottment.  The prep was good.      The scope was then withdrawn back through the cecum, ascending, transverse, descending and sigmoid colons.  The scope was then withdrawn into the rectum and retroflexed.    The scope was straightened, the colon was decompressed and the scope was withdrawn from the patient.  The patient tolerated the procedure well and was taken to the PACU in good condition.    Findings:    Cecum: 3 mm sessile polyp removed using a cold snare  Ascending Colon: 2 mm sessile polyp removed using a cold snare  Transverse Colon: No abnormalities  Descending Colon: 3 sessile polyps ranging from 3 mm to 4 mm removed using a cold snare  Sigmoid Colon: No abnormalities  Rectum: No abnormalities    Estimated Blood Loss: None      Impression:    Scattered colon polyps as described above    Recommendations:    Pathology results will be posted to the patient portal in about a week.  Repeat Colonoscopy in 5 years.    Vin Barros MD, MD   [facility] [place]  2024

## 2024-07-08 ENCOUNTER — TELEPHONE (OUTPATIENT)
Dept: SURGERY | Age: 74
End: 2024-07-08

## 2024-07-08 NOTE — TELEPHONE ENCOUNTER
Patient called stating he needs to schedule a procedure with Dr. Weaver to get a port removed    Please call patient [phone]

## 2024-07-09 NOTE — TELEPHONE ENCOUNTER
Informed patient that we need a order from the oncologist stating you can have the port removed. Please have them faxed that to [phone], then you can be scheduled to have it removed.

## 2024-07-16 ENCOUNTER — OFFICE VISIT (OUTPATIENT)
Dept: ENT CLINIC | Age: 74
End: 2024-07-16
Payer: MEDICARE

## 2024-07-16 VITALS
HEART RATE: 65 BPM | DIASTOLIC BLOOD PRESSURE: 71 MMHG | WEIGHT: 220.4 LBS | SYSTOLIC BLOOD PRESSURE: 110 MMHG | TEMPERATURE: 97.1 F | BODY MASS INDEX: 28.28 KG/M2 | HEIGHT: 74 IN

## 2024-07-16 DIAGNOSIS — Z85.89 ENCOUNTER FOR FOLLOW-UP SURVEILLANCE OF HEAD AND NECK CANCER: ICD-10-CM

## 2024-07-16 DIAGNOSIS — C09.9 CARCINOMA OF TONSIL (HCC): Primary | ICD-10-CM

## 2024-07-16 DIAGNOSIS — Z08 ENCOUNTER FOR FOLLOW-UP SURVEILLANCE OF HEAD AND NECK CANCER: ICD-10-CM

## 2024-07-16 PROCEDURE — 3074F SYST BP LT 130 MM HG: CPT | Performed by: OTOLARYNGOLOGY

## 2024-07-16 PROCEDURE — 1123F ACP DISCUSS/DSCN MKR DOCD: CPT | Performed by: OTOLARYNGOLOGY

## 2024-07-16 PROCEDURE — 3078F DIAST BP <80 MM HG: CPT | Performed by: OTOLARYNGOLOGY

## 2024-07-16 PROCEDURE — 1036F TOBACCO NON-USER: CPT | Performed by: OTOLARYNGOLOGY

## 2024-07-16 PROCEDURE — 3017F COLORECTAL CA SCREEN DOC REV: CPT | Performed by: OTOLARYNGOLOGY

## 2024-07-16 PROCEDURE — G8427 DOCREV CUR MEDS BY ELIG CLIN: HCPCS | Performed by: OTOLARYNGOLOGY

## 2024-07-16 PROCEDURE — G8417 CALC BMI ABV UP PARAM F/U: HCPCS | Performed by: OTOLARYNGOLOGY

## 2024-07-16 PROCEDURE — 99213 OFFICE O/P EST LOW 20 MIN: CPT | Performed by: OTOLARYNGOLOGY

## 2024-07-16 RX ORDER — FOLIC ACID 1 MG/1
1 TABLET ORAL DAILY
COMMUNITY

## 2024-07-16 RX ORDER — UBIDECARENONE 75 MG
50 CAPSULE ORAL DAILY
COMMUNITY

## 2024-07-16 NOTE — PROGRESS NOTES
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  Pathology showed strong p16 expression and the patient was referred for chemoradiation.  Last seen by me in March of this year where he was free of disease.  He remains asymptomatic      PAST MEDICAL HISTORY:   Social History     Tobacco Use   Smoking Status Former    Current packs/day: 0.25    Average packs/day: 0.3 packs/day for 40.0 years (10.0 ttl pk-yrs)    Types: Cigarettes   Smokeless Tobacco Never   Tobacco Comments    He is trying to quit.- 2-3 cigarettes/day                                                    Social History     Substance and Sexual Activity   Alcohol Use Yes    Comment: 2vodka/soda or wine 2-5 glasses per week. no beers                                                    Current Outpatient Medications:     vitamin B-12 (CYANOCOBALAMIN) 100 MCG tablet, Take 0.5 tablets by mouth daily, Disp: , Rfl:     folic acid (FOLVITE) 1 MG tablet, Take 1 tablet by mouth daily, Disp: , Rfl:     apixaban (ELIQUIS) 5 MG TABS tablet, Take 1 tablet by mouth 2 times daily, Disp: 60 tablet, Rfl: 5    spironolactone (ALDACTONE) 25 MG tablet, TAKE 1 TABLET BY MOUTH DAILY, Disp: 90 tablet, Rfl: 1    furosemide (LASIX) 20 MG tablet, TAKE 1 TABLET BY MOUTH DAILY AS NEEDED FOR LEG SWELLING, Disp: 30 tablet, Rfl: 2    atenolol (TENORMIN) 50 MG tablet, Take 0.5 tablets by mouth daily, Disp: 90 tablet, Rfl: 1    atorvastatin (LIPITOR) 40 MG tablet, TAKE 1 TABLET BY MOUTH DAILY, Disp: 90 tablet, Rfl: 1    Ginkgo Biloba 40 MG TABS, Take by mouth, Disp: , Rfl:     Milk Thistle 500 MG CAPS, Take by mouth, Disp: , Rfl:     allopurinol (ZYLOPRIM) 300 MG tablet, TAKE 1 TABLET BY MOUTH DAILY (Patient not taking: Reported on 4/16/2024), Disp: 90 tablet, Rfl: 1

## 2024-07-18 DIAGNOSIS — D64.9 ANEMIA, UNSPECIFIED TYPE: Primary | ICD-10-CM

## 2024-07-18 DIAGNOSIS — C77.0 MALIGNANT NEOPLASM METASTATIC TO LYMPH NODE OF NECK (HCC): ICD-10-CM

## 2024-07-24 ENCOUNTER — OFFICE VISIT (OUTPATIENT)
Dept: INTERNAL MEDICINE CLINIC | Age: 74
End: 2024-07-24

## 2024-07-24 VITALS
OXYGEN SATURATION: 98 % | DIASTOLIC BLOOD PRESSURE: 70 MMHG | TEMPERATURE: 97.6 F | HEART RATE: 66 BPM | BODY MASS INDEX: 28.11 KG/M2 | SYSTOLIC BLOOD PRESSURE: 112 MMHG | WEIGHT: 219 LBS | HEIGHT: 74 IN

## 2024-07-24 DIAGNOSIS — C09.9 MALIGNANT NEOPLASM OF TONSIL (HCC): Primary | ICD-10-CM

## 2024-07-24 DIAGNOSIS — D64.9 ANEMIA, UNSPECIFIED TYPE: ICD-10-CM

## 2024-07-24 DIAGNOSIS — C77.0 MALIGNANT NEOPLASM METASTATIC TO LYMPH NODE OF NECK (HCC): ICD-10-CM

## 2024-07-24 DIAGNOSIS — M54.50 ACUTE RIGHT-SIDED LOW BACK PAIN WITHOUT SCIATICA: ICD-10-CM

## 2024-07-24 DIAGNOSIS — Z92.3 S/P RADIATION THERAPY: ICD-10-CM

## 2024-07-24 DIAGNOSIS — R53.82 CHRONIC FATIGUE: ICD-10-CM

## 2024-07-24 LAB
BASOPHILS # BLD: 0 K/UL (ref 0–0.2)
BASOPHILS NFR BLD: 0.9 %
DEPRECATED RDW RBC AUTO: 15 % (ref 12.4–15.4)
EOSINOPHIL # BLD: 0.2 K/UL (ref 0–0.6)
EOSINOPHIL NFR BLD: 4.8 %
HCT VFR BLD AUTO: 25.4 % (ref 40.5–52.5)
HGB BLD-MCNC: 9.2 G/DL (ref 13.5–17.5)
LYMPHOCYTES # BLD: 1.1 K/UL (ref 1–5.1)
LYMPHOCYTES NFR BLD: 21.5 %
MCH RBC QN AUTO: 36.8 PG (ref 26–34)
MCHC RBC AUTO-ENTMCNC: 36.3 G/DL (ref 31–36)
MCV RBC AUTO: 101.4 FL (ref 80–100)
MONOCYTES # BLD: 0.3 K/UL (ref 0–1.3)
MONOCYTES NFR BLD: 6.4 %
NEUTROPHILS # BLD: 3.3 K/UL (ref 1.7–7.7)
NEUTROPHILS NFR BLD: 66.4 %
PLATELET # BLD AUTO: 133 K/UL (ref 135–450)
PMV BLD AUTO: 7.4 FL (ref 5–10.5)
RBC # BLD AUTO: 2.51 M/UL (ref 4.2–5.9)
WBC # BLD AUTO: 4.9 K/UL (ref 4–11)

## 2024-07-24 RX ORDER — TIZANIDINE 2 MG/1
2 TABLET ORAL NIGHTLY PRN
Qty: 10 TABLET | Refills: 0 | Status: SHIPPED | OUTPATIENT
Start: 2024-07-24

## 2024-07-24 NOTE — PROGRESS NOTES
Arpit Vinson (:  1950) is a 73 y.o. male here for evaluation of the following chief complaint(s):  Thyroid Problem (Sciatic pain (R) side) and Back Pain (Back pain,can't sleep)      Assessment & Plan   ASSESSMENT/PLAN:  1. Malignant neoplasm of tonsil (HCC)  Assessment & Plan:     Managed by Dr. Ashby, [facility]   Dx in 2023, stage I, s/p surgery, radiation, chemo       2. S/P radiation therapy  3. Chronic fatigue  Assessment & Plan:  Check thyroid function.  Lab ordered  4. Acute right-sided low back pain without sciatica  Assessment & Plan:  Discussed physical therapy.  However patient would like to hold off until pain more manageable.  Tizanidine as needed      Return in about 3 months (around 10/24/2024) for Routine follow-up.         Subjective   SUBJECTIVE/OBJECTIVE:  JENNIFER Reddy presented today with lower back pain.  He reports that over the past 2 weeks his back pain has been exacerbated especially on the right.  He did job in [place], involving a lot of bending of the spine, after that did develop back pain.  No radiation of pain he is seeing a chiropractor.  Worsen with activity.  Has not yet tried anything at home.  He was also seen today to monitor thyroid function has been requested by oncology due to history of neck surgery and radiation for tonsil cancer.  Labs ordered but was not able to get them done.  Reports some fatigue but not sure if this is related to his recent chemotherapy.     Review of Systems:   Constitutional:  Denies fever or chills   Eyes:  Denies change in visual acuity   HENT:  Denies nasal congestion or sore throat   Respiratory:  Denies cough or shortness of breath   Cardiovascular:  Denies chest pain or edema   GI:  Denies abdominal pain, nausea, vomiting, bloody stools or diarrhea   :  Denies dysuria   Musculoskeletal:  Denies back pain or joint pain   Integument:  Denies rash   Neurologic:  Denies headache, focal weakness or sensory changes   Endocrine:  Denies

## 2024-07-25 ENCOUNTER — TELEPHONE (OUTPATIENT)
Dept: SURGERY | Age: 74
End: 2024-07-25

## 2024-07-25 LAB
ALBUMIN SERPL-MCNC: 4.7 G/DL (ref 3.4–5)
ALBUMIN/GLOB SERPL: 1.7 {RATIO} (ref 1.1–2.2)
ALP SERPL-CCNC: 102 U/L (ref 40–129)
ALT SERPL-CCNC: 9 U/L (ref 10–40)
ANION GAP SERPL CALCULATED.3IONS-SCNC: 18 MMOL/L (ref 3–16)
AST SERPL-CCNC: 22 U/L (ref 15–37)
BILIRUB SERPL-MCNC: 0.6 MG/DL (ref 0–1)
BUN SERPL-MCNC: 48 MG/DL (ref 7–20)
CALCIUM SERPL-MCNC: 9.3 MG/DL (ref 8.3–10.6)
CHLORIDE SERPL-SCNC: 94 MMOL/L (ref 99–110)
CO2 SERPL-SCNC: 19 MMOL/L (ref 21–32)
CREAT SERPL-MCNC: 2 MG/DL (ref 0.8–1.3)
GFR SERPLBLD CREATININE-BSD FMLA CKD-EPI: 35 ML/MIN/{1.73_M2}
GLUCOSE SERPL-MCNC: 109 MG/DL (ref 70–99)
POTASSIUM SERPL-SCNC: 4.1 MMOL/L (ref 3.5–5.1)
PROT SERPL-MCNC: 7.4 G/DL (ref 6.4–8.2)
SODIUM SERPL-SCNC: 131 MMOL/L (ref 136–145)
T4 FREE SERPL-MCNC: 0.4 NG/DL (ref 0.9–1.8)
TSH SERPL DL<=0.005 MIU/L-ACNC: 65.89 UIU/ML (ref 0.27–4.2)

## 2024-07-25 NOTE — TELEPHONE ENCOUNTER
Patient states he is returning a call to Bernarda in reference to scheduling surgery date. He would like to try for next Tuesday 07/30/2024.     Patient has additional questions about what the restrictions will be after the procedure and where the procedure would take place. He would like to confirm this information prior to the date being finalized.     The patient can be reached at [phone].

## 2024-07-25 NOTE — ASSESSMENT & PLAN NOTE
Discussed physical therapy.  However patient would like to hold off until pain more manageable.  Tizanidine as needed

## 2024-07-25 NOTE — ASSESSMENT & PLAN NOTE
Managed by Dr. Ashby, [facility]   Dx in 11/2023, stage I, s/p surgery, radiation, chemo

## 2024-07-26 ENCOUNTER — PREP FOR PROCEDURE (OUTPATIENT)
Dept: SURGERY | Age: 74
End: 2024-07-26

## 2024-07-26 DIAGNOSIS — E78.00 PURE HYPERCHOLESTEROLEMIA: ICD-10-CM

## 2024-07-26 DIAGNOSIS — C09.9 CANCER OF TONSIL (HCC): ICD-10-CM

## 2024-07-26 DIAGNOSIS — E89.0 POSTOPERATIVE HYPOTHYROIDISM: Primary | ICD-10-CM

## 2024-07-26 DIAGNOSIS — N18.9 CHRONIC KIDNEY DISEASE, UNSPECIFIED CKD STAGE: ICD-10-CM

## 2024-07-26 RX ORDER — ATENOLOL 50 MG/1
50 TABLET ORAL DAILY
Qty: 90 TABLET | Refills: 1 | Status: SHIPPED | OUTPATIENT
Start: 2024-07-26

## 2024-07-26 RX ORDER — LEVOTHYROXINE SODIUM 0.03 MG/1
25 TABLET ORAL DAILY
Qty: 90 TABLET | Refills: 1 | Status: SHIPPED | OUTPATIENT
Start: 2024-07-26

## 2024-07-26 RX ORDER — ATORVASTATIN CALCIUM 40 MG/1
40 TABLET, FILM COATED ORAL DAILY
Qty: 90 TABLET | Refills: 1 | Status: SHIPPED | OUTPATIENT
Start: 2024-07-26

## 2024-07-26 NOTE — PROGRESS NOTES
[facility] PRE-SURGICAL TESTING INSTRUCTIONS                      PRIOR TO PROCEDURE DATE:    1. PLEASE FOLLOW ANY INSTRUCTIONS GIVEN TO YOU PER YOUR SURGEON.      2. Arrange for someone to drive you home and be with you for the first 24 hours after discharge for your safety after your procedure for which you received sedation. Ensure it is someone we can share information with regarding your discharge.     NOTE: At this time ONLY 2 ADULTS may accompany you   One person ENCOURAGED to stay at hospital entire time if outpatient surgery      3. You must contact your surgeon for instructions IF:  You are taking any blood thinners, aspirin, anti-inflammatory or vitamins.  There is a change in your physical condition such as a cold, fever, rash, cuts, sores, or any other infection, especially near your surgical site.    4. Do not drink alcohol the day before or day of your procedure.  Do not use any recreational marijuana at least 24 hours or street drugs (heroin, cocaine) at minimum 5 days prior to your procedure.     5. A Pre-Surgical History and Physical MUST be completed WITHIN 30 DAYS OR LESS prior to your procedure.by your Physician or an Urgent Care        THE DAY OF YOUR PROCEDURE:  1.  Follow instructions for ARRIVAL TIME as DIRECTED BY YOUR SURGEON.     2. Enter the MAIN entrance from [facility] and follow the signs to the free Parking Garage or  Parking (offered free of charge 7 am-5pm).      3. Enter the Main Entrance of the hospital (do not enter from the lower level of the parking garage). Upon entrance, check in with the  at the surgical information desk on your LEFT.   Bring your insurance card and photo ID to register      4. DO NOT EAT ANYTHING 8 hours prior to arrival for surgery.  You may have up to 8 ounces of water 4 hours prior to your arrival for surgery.   NOTE: ALL Gastric, Bariatric & Bowel surgery patients - you MUST follow your surgeon's instructions regarding

## 2024-07-26 NOTE — TELEPHONE ENCOUNTER
Patient scheduled for surgery on 7/30/24 Port removal     MD to do H&P the DOS       Case request sent     Prep for proc completed     Placed on calendar and outlook

## 2024-07-29 RX ORDER — SODIUM CHLORIDE 0.9 % (FLUSH) 0.9 %
5-40 SYRINGE (ML) INJECTION PRN
Status: CANCELLED | OUTPATIENT
Start: 2024-07-30

## 2024-07-29 RX ORDER — SODIUM CHLORIDE 9 MG/ML
INJECTION, SOLUTION INTRAVENOUS PRN
Status: CANCELLED | OUTPATIENT
Start: 2024-07-30

## 2024-07-29 RX ORDER — SODIUM CHLORIDE 0.9 % (FLUSH) 0.9 %
5-40 SYRINGE (ML) INJECTION EVERY 12 HOURS SCHEDULED
Status: CANCELLED | OUTPATIENT
Start: 2024-07-30

## 2024-07-30 ENCOUNTER — ANESTHESIA (OUTPATIENT)
Dept: OPERATING ROOM | Age: 74
End: 2024-07-30
Payer: MEDICARE

## 2024-07-30 ENCOUNTER — ANESTHESIA EVENT (OUTPATIENT)
Dept: OPERATING ROOM | Age: 74
End: 2024-07-30
Payer: MEDICARE

## 2024-07-30 ENCOUNTER — HOSPITAL ENCOUNTER (OUTPATIENT)
Age: 74
Setting detail: OUTPATIENT SURGERY
Discharge: HOME | End: 2024-07-30
Attending: SURGERY | Admitting: SURGERY
Payer: MEDICARE

## 2024-07-30 ENCOUNTER — TELEPHONE (OUTPATIENT)
Dept: INTERNAL MEDICINE CLINIC | Age: 74
End: 2024-07-30

## 2024-07-30 VITALS
SYSTOLIC BLOOD PRESSURE: 121 MMHG | WEIGHT: 214 LBS | DIASTOLIC BLOOD PRESSURE: 81 MMHG | RESPIRATION RATE: 11 BRPM | TEMPERATURE: 97.4 F | OXYGEN SATURATION: 100 % | HEIGHT: 74 IN | BODY MASS INDEX: 27.46 KG/M2 | HEART RATE: 61 BPM

## 2024-07-30 PROCEDURE — 2580000003 HC RX 258: Performed by: SURGERY

## 2024-07-30 PROCEDURE — 2709999900 HC NON-CHARGEABLE SUPPLY: Performed by: SURGERY

## 2024-07-30 PROCEDURE — 2580000003 HC RX 258

## 2024-07-30 PROCEDURE — A4217 STERILE WATER/SALINE, 500 ML: HCPCS | Performed by: SURGERY

## 2024-07-30 PROCEDURE — 6370000000 HC RX 637 (ALT 250 FOR IP): Performed by: ANESTHESIOLOGY

## 2024-07-30 PROCEDURE — 3700000000 HC ANESTHESIA ATTENDED CARE: Performed by: SURGERY

## 2024-07-30 PROCEDURE — 7100000010 HC PHASE II RECOVERY - FIRST 15 MIN: Performed by: SURGERY

## 2024-07-30 PROCEDURE — 2500000003 HC RX 250 WO HCPCS

## 2024-07-30 PROCEDURE — 2580000003 HC RX 258: Performed by: ANESTHESIOLOGY

## 2024-07-30 PROCEDURE — 3600000002 HC SURGERY LEVEL 2 BASE: Performed by: SURGERY

## 2024-07-30 PROCEDURE — 7100000001 HC PACU RECOVERY - ADDTL 15 MIN: Performed by: SURGERY

## 2024-07-30 PROCEDURE — 7100000011 HC PHASE II RECOVERY - ADDTL 15 MIN: Performed by: SURGERY

## 2024-07-30 PROCEDURE — 3700000001 HC ADD 15 MINUTES (ANESTHESIA): Performed by: SURGERY

## 2024-07-30 PROCEDURE — 6360000002 HC RX W HCPCS: Performed by: SURGERY

## 2024-07-30 PROCEDURE — 3600000012 HC SURGERY LEVEL 2 ADDTL 15MIN: Performed by: SURGERY

## 2024-07-30 PROCEDURE — 6360000002 HC RX W HCPCS

## 2024-07-30 PROCEDURE — 7100000000 HC PACU RECOVERY - FIRST 15 MIN: Performed by: SURGERY

## 2024-07-30 RX ORDER — OXYCODONE HYDROCHLORIDE 5 MG/1
10 TABLET ORAL PRN
Status: COMPLETED | OUTPATIENT
Start: 2024-07-30 | End: 2024-07-30

## 2024-07-30 RX ORDER — LIDOCAINE HYDROCHLORIDE 10 MG/ML
1 INJECTION, SOLUTION EPIDURAL; INFILTRATION; INTRACAUDAL; PERINEURAL
Status: DISCONTINUED | OUTPATIENT
Start: 2024-07-30 | End: 2024-07-30 | Stop reason: HOSPADM

## 2024-07-30 RX ORDER — SODIUM CHLORIDE 0.9 % (FLUSH) 0.9 %
5-40 SYRINGE (ML) INJECTION EVERY 12 HOURS SCHEDULED
Status: DISCONTINUED | OUTPATIENT
Start: 2024-07-30 | End: 2024-07-30 | Stop reason: HOSPADM

## 2024-07-30 RX ORDER — HYDROMORPHONE HYDROCHLORIDE 1 MG/ML
0.5 INJECTION, SOLUTION INTRAMUSCULAR; INTRAVENOUS; SUBCUTANEOUS EVERY 5 MIN PRN
Status: DISCONTINUED | OUTPATIENT
Start: 2024-07-30 | End: 2024-07-30 | Stop reason: HOSPADM

## 2024-07-30 RX ORDER — LABETALOL HYDROCHLORIDE 5 MG/ML
10 INJECTION, SOLUTION INTRAVENOUS
Status: DISCONTINUED | OUTPATIENT
Start: 2024-07-30 | End: 2024-07-30 | Stop reason: HOSPADM

## 2024-07-30 RX ORDER — SODIUM CHLORIDE 9 MG/ML
INJECTION, SOLUTION INTRAVENOUS PRN
Status: DISCONTINUED | OUTPATIENT
Start: 2024-07-30 | End: 2024-07-30 | Stop reason: HOSPADM

## 2024-07-30 RX ORDER — PROPOFOL 10 MG/ML
INJECTION, EMULSION INTRAVENOUS PRN
Status: DISCONTINUED | OUTPATIENT
Start: 2024-07-30 | End: 2024-07-30 | Stop reason: SDUPTHER

## 2024-07-30 RX ORDER — GLYCOPYRROLATE 0.2 MG/ML
INJECTION INTRAMUSCULAR; INTRAVENOUS PRN
Status: DISCONTINUED | OUTPATIENT
Start: 2024-07-30 | End: 2024-07-30 | Stop reason: SDUPTHER

## 2024-07-30 RX ORDER — FENTANYL CITRATE 50 UG/ML
25 INJECTION, SOLUTION INTRAMUSCULAR; INTRAVENOUS EVERY 5 MIN PRN
Status: DISCONTINUED | OUTPATIENT
Start: 2024-07-30 | End: 2024-07-30 | Stop reason: HOSPADM

## 2024-07-30 RX ORDER — BUPIVACAINE HYDROCHLORIDE 5 MG/ML
INJECTION, SOLUTION EPIDURAL; INTRACAUDAL PRN
Status: DISCONTINUED | OUTPATIENT
Start: 2024-07-30 | End: 2024-07-30 | Stop reason: HOSPADM

## 2024-07-30 RX ORDER — SODIUM CHLORIDE 0.9 % (FLUSH) 0.9 %
5-40 SYRINGE (ML) INJECTION PRN
Status: DISCONTINUED | OUTPATIENT
Start: 2024-07-30 | End: 2024-07-30 | Stop reason: HOSPADM

## 2024-07-30 RX ORDER — PROCHLORPERAZINE EDISYLATE 5 MG/ML
5 INJECTION INTRAMUSCULAR; INTRAVENOUS
Status: DISCONTINUED | OUTPATIENT
Start: 2024-07-30 | End: 2024-07-30 | Stop reason: HOSPADM

## 2024-07-30 RX ORDER — NALOXONE HYDROCHLORIDE 0.4 MG/ML
INJECTION, SOLUTION INTRAMUSCULAR; INTRAVENOUS; SUBCUTANEOUS PRN
Status: DISCONTINUED | OUTPATIENT
Start: 2024-07-30 | End: 2024-07-30 | Stop reason: HOSPADM

## 2024-07-30 RX ORDER — OXYCODONE HYDROCHLORIDE 5 MG/1
5 TABLET ORAL PRN
Status: COMPLETED | OUTPATIENT
Start: 2024-07-30 | End: 2024-07-30

## 2024-07-30 RX ORDER — MAGNESIUM HYDROXIDE 1200 MG/15ML
LIQUID ORAL CONTINUOUS PRN
Status: COMPLETED | OUTPATIENT
Start: 2024-07-30 | End: 2024-07-30

## 2024-07-30 RX ORDER — LIDOCAINE HYDROCHLORIDE 20 MG/ML
INJECTION, SOLUTION EPIDURAL; INFILTRATION; INTRACAUDAL; PERINEURAL PRN
Status: DISCONTINUED | OUTPATIENT
Start: 2024-07-30 | End: 2024-07-30 | Stop reason: SDUPTHER

## 2024-07-30 RX ORDER — SODIUM CHLORIDE, SODIUM LACTATE, POTASSIUM CHLORIDE, CALCIUM CHLORIDE 600; 310; 30; 20 MG/100ML; MG/100ML; MG/100ML; MG/100ML
INJECTION, SOLUTION INTRAVENOUS CONTINUOUS
Status: DISCONTINUED | OUTPATIENT
Start: 2024-07-30 | End: 2024-07-30 | Stop reason: HOSPADM

## 2024-07-30 RX ADMIN — WATER 2000 MG: 1 INJECTION INTRAMUSCULAR; INTRAVENOUS; SUBCUTANEOUS at 10:42

## 2024-07-30 RX ADMIN — GLYCOPYRROLATE 0.4 MG: 0.2 INJECTION INTRAMUSCULAR; INTRAVENOUS at 10:39

## 2024-07-30 RX ADMIN — PROPOFOL 150 MCG/KG/MIN: 10 INJECTION, EMULSION INTRAVENOUS at 10:38

## 2024-07-30 RX ADMIN — DEXMEDETOMIDINE HYDROCHLORIDE 4 MCG: 100 INJECTION, SOLUTION INTRAVENOUS at 10:32

## 2024-07-30 RX ADMIN — LIDOCAINE HYDROCHLORIDE 100 MG: 20 INJECTION, SOLUTION EPIDURAL; INFILTRATION; INTRACAUDAL; PERINEURAL at 10:37

## 2024-07-30 RX ADMIN — DEXMEDETOMIDINE HYDROCHLORIDE 4 MCG: 100 INJECTION, SOLUTION INTRAVENOUS at 10:49

## 2024-07-30 RX ADMIN — OXYCODONE HYDROCHLORIDE 10 MG: 5 TABLET ORAL at 11:35

## 2024-07-30 RX ADMIN — SODIUM CHLORIDE, POTASSIUM CHLORIDE, SODIUM LACTATE AND CALCIUM CHLORIDE: 600; 310; 30; 20 INJECTION, SOLUTION INTRAVENOUS at 09:28

## 2024-07-30 RX ADMIN — PROPOFOL 40 MG: 10 INJECTION, EMULSION INTRAVENOUS at 10:37

## 2024-07-30 ASSESSMENT — PAIN DESCRIPTION - LOCATION: LOCATION: CHEST

## 2024-07-30 ASSESSMENT — PAIN SCALES - GENERAL
PAINLEVEL_OUTOF10: 0
PAINLEVEL_OUTOF10: 0

## 2024-07-30 ASSESSMENT — PAIN DESCRIPTION - ORIENTATION: ORIENTATION: LEFT

## 2024-07-30 ASSESSMENT — PAIN DESCRIPTION - DESCRIPTORS: DESCRIPTORS: ACHING

## 2024-07-30 ASSESSMENT — PAIN - FUNCTIONAL ASSESSMENT
PAIN_FUNCTIONAL_ASSESSMENT: NONE - DENIES PAIN
PAIN_FUNCTIONAL_ASSESSMENT: 0-10

## 2024-07-30 NOTE — ANESTHESIA PRE PROCEDURE
by Ward Gale MD at [facility]       Social History:    Social History     Tobacco Use   • Smoking status: Former     Current packs/day: 0.25     Average packs/day: 0.3 packs/day for 40.0 years (10.0 ttl pk-yrs)     Types: Cigarettes     Passive exposure: Never   • Smokeless tobacco: Never   Substance Use Topics   • Alcohol use: Yes     Comment: pt states \"unable to say\" how many alcoholic drinks in 1 week                                Counseling given: Not Answered      Vital Signs (Current):   Vitals:    07/26/24 1609 07/30/24 0845   BP:  119/80   Pulse:  59   Resp:  14   Temp:  97.5 °F (36.4 °C)   TempSrc:  Temporal   SpO2:  100%   Weight: 99.8 kg (220 lb) 97.1 kg (214 lb)   Height: 1.88 m (6' 2\") 1.88 m (6' 2\")                                              BP Readings from Last 3 Encounters:   07/30/24 119/80   07/24/24 112/70   07/16/24 110/71       NPO Status:                                                                                 BMI:   Wt Readings from Last 3 Encounters:   07/30/24 97.1 kg (214 lb)   07/24/24 99.3 kg (219 lb)   07/16/24 100 kg (220 lb 6.4 oz)     Body mass index is 27.48 kg/m².    CBC:   Lab Results   Component Value Date/Time    WBC 4.9 07/24/2024 03:58 PM    RBC 2.51 07/24/2024 03:58 PM    HGB 9.2 07/24/2024 03:58 PM    HCT 25.4 07/24/2024 03:58 PM    .4 07/24/2024 03:58 PM    RDW 15.0 07/24/2024 03:58 PM     07/24/2024 03:58 PM       CMP:   Lab Results   Component Value Date/Time     07/24/2024 03:58 PM    K 4.1 07/24/2024 03:58 PM    K 4.3 02/08/2024 05:18 PM    CL 94 07/24/2024 03:58 PM    CO2 19 07/24/2024 03:58 PM    BUN 48 07/24/2024 03:58 PM    CREATININE 2.0 07/24/2024 03:58 PM    GFRAA >60 09/01/2022 03:03 PM    GFRAA >60 11/08/2011 04:49 PM    AGRATIO 1.7 07/24/2024 03:58 PM    LABGLOM 35 07/24/2024 03:58 PM    LABGLOM 53 02/12/2024 08:15 AM    GLUCOSE 109 07/24/2024 03:58 PM    CALCIUM 9.3 07/24/2024 03:58 PM    BILITOT 0.6 07/24/2024

## 2024-07-30 NOTE — H&P
Update History & Physical    The patient's History and Physical of July 24, Dr. Evangelista was reviewed with the patient and I examined the patient. There was no change. The surgical site was confirmed by the patient and me.       Plan: The risks, benefits, expected outcome, and alternative to the recommended procedure have been discussed with the patient. Patient understands and wants to proceed with the procedure.     Electronically signed by Chris Santoro DO on 7/30/2024 at 10:15 AM

## 2024-07-30 NOTE — DISCHARGE INSTRUCTIONS
Discharge Instructions:     Diet:   You may resume a regular diet.     Wound Care:   Skin glue was used to cover your incision(s). It will fall off on its own in about 10 days. You may shower, but do not scrub the incision sites directly or soak (tub, pool, etc.).     Activity:   No heavy lifting greater than a milk jug until follow up.     Pain management:   Unless informed of any restrictions by your primary care physician, please use your preferred over-the-counter pain reliever as your primary pain medication.      Return Precautions:   Call/ Return to ED for increased redness, worsening pain, drainage from wound, fevers, or any other concerns about your incision or post op course.    Anticoagulation:  Patient may restart his Eliquis in 2 days (8/01/24).     Call your oncologist for your next follow up. You may follow up with Dr. Weaver as needed. Please call [phone] to schedule your appointment.        [facility] AMBULATORY PROCEDURE DISCHARGE INSTRUCTIONS    There are potential side effects of anesthesia or sedation you may experience for the first 24 hours.  These side effects include:    Confusion or Memory loss, Dizziness, or Delayed Reaction Times   [x]A responsible person should be with you for the next 24 hours.  Do not operate any vehicles (automobiles, bicycles, motorcycles) or power tools or machinery for 24 hours.  Do not sign any legal documents or make any legal decisions for 24 hours. Do not drink alcohol for 24 hours or while taking narcotic pain medication.      Nausea    [x]Start with light diet and progress to your normal diet as you feel like eating. However, if you experience nausea or repeated episodes of vomiting which persist beyond 12-24 hours, notify your physician.  Once nausea has passed, remember to keep drinking fluids.    Difficulty Passing Urine  [x]Drink extra amounts of fluid today.  Notify your physician if you have not urinated within 8 hours after your procedure

## 2024-07-30 NOTE — PROGRESS NOTES
From OR to PACU.   Post op PROCEDURES  PORT REMOVAL  Maldonado Weaver MD  Report received from CRNA at bedside.   Patient awake- no complaints of pain or nausea.     Lest chest with incision- dermabond intact.   No bruising, or swelling noted. Ice pack applied.   Good radial pulses present. Denies any numbness in LUE.

## 2024-07-30 NOTE — ANESTHESIA POSTPROCEDURE EVALUATION
Department of Anesthesiology  Postprocedure Note    Patient: Arpit Vinson  MRN: [id number]  YOB: 1950  Date of evaluation: 7/30/2024    Procedure Summary       Date: 07/30/24 Room / Location: [place] [address] / [facility]    Anesthesia Start: 1034 Anesthesia Stop: 1109    Procedure: PORT REMOVAL (Chest) Diagnosis:       Cancer of tonsil (HCC)      (Cancer of tonsil (HCC) [C09.9])    Surgeons: Maldonado Weaver MD Responsible Provider: Ward Leung DO    Anesthesia Type: general ASA Status: 3            Anesthesia Type: No value filed.    Jose Phase I: Jose Score: 10    Jose Phase II: Jose Score: 10    Anesthesia Post Evaluation    Patient location during evaluation: PACU  Patient participation: complete - patient participated  Level of consciousness: awake and awake and alert  Pain score: 0  Airway patency: patent  Nausea & Vomiting: no nausea and no vomiting  Cardiovascular status: hemodynamically stable  Respiratory status: acceptable  Hydration status: euvolemic  Pain management: adequate and satisfactory to patient        No notable events documented.

## 2024-07-30 NOTE — PROGRESS NOTES
PACU Transfer to [facility]    Vitals:    07/30/24 1140   BP:    Pulse: 61   Resp:    Temp:    SpO2:        No intake or output data in the 24 hours ending 07/30/24 1141    Pain assessment:  none VS stable. Pain level tolerable. Patient to [facility] bed#5 for discharge home.   Pain Level: 0    Patient transferred to care of [facility] RN.    7/30/2024 11:41 AM

## 2024-07-30 NOTE — TELEPHONE ENCOUNTER
Pt called back to speak with Lashaun. He states yes to receiving the message and yes to picking up the medication but may call back later to speak with Lashaun to ask her questions pertaining to the message she left him.    Pt did not want to leave his questions in a message and may just call back later he stated.       [phone]

## 2024-08-01 NOTE — OP NOTE
[address] [address]                            OPERATIVE REPORT      PATIENT NAME: MICHAELLE SINGER          : 1950  MED REC NO: [id number]                      ROOM: OR  ACCOUNT NO: [id number]                       ADMIT DATE: 2024  PROVIDER: Maldonado Weaver MD      DATE OF PROCEDURE:  2024    SURGEON:  Maldonado Weaver MD    PREOPERATIVE DIAGNOSIS:  Status post port placement for right tonsillar carcinoma.    POSTOPERATIVE DIAGNOSIS:  Status post port placement for right tonsillar carcinoma.    PROCEDURE PERFORMED:  Left subclavian single-lumen Port-A-Cath removal.    ANESTHESIA:  Local MAC.    ESTIMATED BLOOD LOSS:  Minimal.    COMPLICATIONS:  None.    SPECIMEN:  Gross evaluation of port.    INDICATION FOR THE PROCEDURE:  The patient is a 73-year-old male with a previous history of a right tonsillar carcinoma, who has undergone his treatment and is no longer utilizing his port.  As a result of this, a decision was made to proceed with removal.  Risks and benefits of the procedure were discussed with the patient.  Consent was obtained.    DETAILS OF THE OPERATION:  The patient was brought to the operating room and placed in supine position.  After adequate sedation, the patient was prepped and draped in a sterile fashion.  Previous incision of the port was anesthetized with 0.5% Marcaine.  Following adequate local anesthetic, an incision was made using a 15 blade scalpel.  Electrocautery was then used to deepen the incision and to identify the port.  The port pocket was then opened using electrocautery to allow exposure of the port.  Two previously placed sutures were then removed.  Port was then liberated from its pocket.  A U-stitch was placed around the catheter, and the port and catheter were then removed without difficulty.  Suture was secured.  Good hemostasis was noted.  The port pocket was then

## 2024-08-02 RX ORDER — TIZANIDINE 2 MG/1
TABLET ORAL
Qty: 10 TABLET | Refills: 0 | Status: SHIPPED | OUTPATIENT
Start: 2024-08-02

## 2024-08-13 RX ORDER — TIZANIDINE 2 MG/1
TABLET ORAL
Qty: 10 TABLET | Refills: 0 | Status: SHIPPED | OUTPATIENT
Start: 2024-08-13

## 2024-08-29 DIAGNOSIS — E89.0 POSTOPERATIVE HYPOTHYROIDISM: ICD-10-CM

## 2024-08-29 DIAGNOSIS — E78.00 PURE HYPERCHOLESTEROLEMIA: ICD-10-CM

## 2024-08-29 DIAGNOSIS — N18.9 CHRONIC KIDNEY DISEASE, UNSPECIFIED CKD STAGE: ICD-10-CM

## 2024-08-29 LAB
ALBUMIN SERPL-MCNC: 4.7 G/DL (ref 3.4–5)
ANION GAP SERPL CALCULATED.3IONS-SCNC: 13 MMOL/L (ref 3–16)
BUN SERPL-MCNC: 25 MG/DL (ref 7–20)
CALCIUM SERPL-MCNC: 9.3 MG/DL (ref 8.3–10.6)
CHLORIDE SERPL-SCNC: 96 MMOL/L (ref 99–110)
CO2 SERPL-SCNC: 23 MMOL/L (ref 21–32)
CREAT SERPL-MCNC: 1.5 MG/DL (ref 0.8–1.3)
GFR SERPLBLD CREATININE-BSD FMLA CKD-EPI: 49 ML/MIN/{1.73_M2}
GLUCOSE SERPL-MCNC: 113 MG/DL (ref 70–99)
PHOSPHATE SERPL-MCNC: 2.9 MG/DL (ref 2.5–4.9)
POTASSIUM SERPL-SCNC: 4.3 MMOL/L (ref 3.5–5.1)
SODIUM SERPL-SCNC: 132 MMOL/L (ref 136–145)
T4 FREE SERPL-MCNC: 0.8 NG/DL (ref 0.9–1.8)
TSH SERPL DL<=0.005 MIU/L-ACNC: 37 UIU/ML (ref 0.27–4.2)

## 2024-08-29 RX ORDER — TIZANIDINE 2 MG/1
2 TABLET ORAL NIGHTLY
Qty: 20 TABLET | Refills: 0 | Status: SHIPPED | OUTPATIENT
Start: 2024-08-29

## 2024-08-29 RX ORDER — ATORVASTATIN CALCIUM 40 MG/1
40 TABLET, FILM COATED ORAL DAILY
Qty: 90 TABLET | Refills: 0 | Status: SHIPPED | OUTPATIENT
Start: 2024-08-29

## 2024-08-29 NOTE — TELEPHONE ENCOUNTER
REFILL    atorvastatin (LIPITOR) 40 MG tablet     tiZANidine (ZANAFLEX) 2 MG tablet       [facility] #[id number] [facility] [address] - P [phone] - F [phone]

## 2024-09-12 NOTE — ANESTHESIA PRE PROCEDURE
\"POCCL\", \"POCBUN\", \"POCHEMO\", \"POCHCT\" in the last 72 hours.    Coags:   Lab Results   Component Value Date/Time    PROTIME 15.2 12/08/2023 04:37 AM    INR 1.20 12/08/2023 04:37 AM    APTT 34.6 10/07/2021 10:08 AM       HCG (If Applicable): No results found for: \"PREGTESTUR\", \"PREGSERUM\", \"HCG\", \"HCGQUANT\"     ABGs: No results found for: \"PHART\", \"PO2ART\", \"YAR4FCT\", \"MGC8NJN\", \"BEART\", \"H5RGHDNC\"     Type & Screen (If Applicable):  No results found for: \"LABABO\", \"LABRH\"    Drug/Infectious Status (If Applicable):  No results found for: \"HIV\", \"HEPCAB\"    COVID-19 Screening (If Applicable):   Lab Results   Component Value Date/Time    COVID19 NOT DETECTED 01/23/2024 02:45 PM           Anesthesia Evaluation  Patient summary reviewed and Nursing notes reviewed   no history of anesthetic complications:   Airway: Mallampati: II  TM distance: >3 FB   Neck ROM: full  Mouth opening: > = 3 FB   Dental:    (+) upper dentures and poor dentition      Pulmonary:normal exam    (+)           current smoker                           Cardiovascular:    (+) hypertension:, CAD:      ECG reviewed      Echocardiogram reviewed               ROS comment: ECHO 12/23    Suboptimal image quality. Definity contrast administered. Left ventricular cavity size is normal. Normal left ventricular wall thickness. Overall left ventricular systolic function appears normal. Ejection fraction is visually estimated to be 60%. No regional wall motion abnormalities are noted. indeterminate diastolic function. he ascending aorta is borderline dilated measuring at 4.0cm.        Neuro/Psych:                ROS comment: Alcohol abuse GI/Hepatic/Renal:   (+) hepatitis (ETOH): C, liver disease:         ROS comment: Hepatic cirrhosis.   Endo/Other: Negative Endo/Other ROS   (+) blood dyscrasia: anemia:., malignancy/cancer (Tonsilar/Head and neck).                 Abdominal:             Vascular: negative vascular ROS.         Other Findings:          show

## 2024-09-16 RX ORDER — TIZANIDINE 2 MG/1
2 TABLET ORAL NIGHTLY
Qty: 30 TABLET | Refills: 0 | Status: SHIPPED | OUTPATIENT
Start: 2024-09-16

## 2024-09-25 DIAGNOSIS — N18.30 STAGE 3 CHRONIC KIDNEY DISEASE, UNSPECIFIED WHETHER STAGE 3A OR 3B CKD (HCC): Primary | ICD-10-CM

## 2024-10-08 ENCOUNTER — TELEPHONE (OUTPATIENT)
Dept: CASE MANAGEMENT | Age: 74
End: 2024-10-08

## 2024-10-08 DIAGNOSIS — M79.89 PAIN AND SWELLING OF LOWER EXTREMITY, UNSPECIFIED LATERALITY: ICD-10-CM

## 2024-10-08 DIAGNOSIS — Z87.891 HISTORY OF TOBACCO USE: Primary | ICD-10-CM

## 2024-10-08 DIAGNOSIS — M79.606 PAIN AND SWELLING OF LOWER EXTREMITY, UNSPECIFIED LATERALITY: ICD-10-CM

## 2024-10-08 RX ORDER — SPIRONOLACTONE 25 MG/1
25 TABLET ORAL DAILY
Qty: 90 TABLET | Refills: 1 | Status: SHIPPED | OUTPATIENT
Start: 2024-10-08

## 2024-10-08 RX ORDER — ATENOLOL 50 MG/1
50 TABLET ORAL DAILY
Qty: 90 TABLET | Refills: 1 | Status: SHIPPED | OUTPATIENT
Start: 2024-10-08

## 2024-10-08 NOTE — TELEPHONE ENCOUNTER
Dr. Evangelista,     Patient due for annual CT Lung screening. For your convenience, I have pended the order for the scan.  Please sign if you agree with annual screening for this pt.  I will help contact the pt to schedule.    Reminder letter mailed to pt.     Thanks,  Carly GARCIAN, RN   Lung Nodule Navigator  [facility]  [phone]

## 2024-10-08 NOTE — TELEPHONE ENCOUNTER
Pt needs medication refill below    [phone]  Pls call and advise      spironolactone (ALDACTONE) 25 MG tablet [[id number]]       atenolol (TENORMIN) 50 MG tablet [[id number]]     [facility] #[id number] [place] [address] - P [phone] - F [phone]

## 2024-10-14 RX ORDER — TIZANIDINE 2 MG/1
2 TABLET ORAL NIGHTLY
Qty: 30 TABLET | Refills: 0 | Status: SHIPPED | OUTPATIENT
Start: 2024-10-14

## 2024-11-13 ENCOUNTER — TELEPHONE (OUTPATIENT)
Dept: CT IMAGING | Age: 74
End: 2024-11-13

## 2024-11-13 NOTE — TELEPHONE ENCOUNTER
Patient due for annual CT Lung Screening. Reminder letter mailed.  Call [phone] to schedule.

## 2024-11-14 RX ORDER — TIZANIDINE 2 MG/1
2 TABLET ORAL NIGHTLY
Qty: 30 TABLET | Refills: 0 | Status: SHIPPED | OUTPATIENT
Start: 2024-11-14

## 2024-11-19 ENCOUNTER — HOSPITAL ENCOUNTER (OUTPATIENT)
Dept: ULTRASOUND IMAGING | Age: 74
Discharge: HOME | End: 2024-11-19
Attending: INTERNAL MEDICINE
Payer: MEDICARE

## 2024-11-19 PROCEDURE — 76770 US EXAM ABDO BACK WALL COMP: CPT

## 2024-11-25 DIAGNOSIS — E78.00 PURE HYPERCHOLESTEROLEMIA: ICD-10-CM

## 2024-11-25 RX ORDER — ATORVASTATIN CALCIUM 40 MG/1
40 TABLET, FILM COATED ORAL DAILY
Qty: 90 TABLET | Refills: 1 | Status: SHIPPED | OUTPATIENT
Start: 2024-11-25

## 2024-12-03 ENCOUNTER — OFFICE VISIT (OUTPATIENT)
Dept: INTERNAL MEDICINE CLINIC | Age: 74
End: 2024-12-03

## 2024-12-03 ENCOUNTER — HOSPITAL ENCOUNTER (OUTPATIENT)
Dept: GENERAL RADIOLOGY | Age: 74
Discharge: HOME | End: 2024-12-03
Payer: MEDICARE

## 2024-12-03 VITALS
WEIGHT: 217 LBS | HEART RATE: 74 BPM | OXYGEN SATURATION: 99 % | BODY MASS INDEX: 27.85 KG/M2 | HEIGHT: 74 IN | TEMPERATURE: 98.2 F | DIASTOLIC BLOOD PRESSURE: 84 MMHG | SYSTOLIC BLOOD PRESSURE: 140 MMHG

## 2024-12-03 DIAGNOSIS — Z00.00 MEDICARE ANNUAL WELLNESS VISIT, SUBSEQUENT: Primary | ICD-10-CM

## 2024-12-03 DIAGNOSIS — M54.50 ACUTE RIGHT-SIDED LOW BACK PAIN WITHOUT SCIATICA: ICD-10-CM

## 2024-12-03 DIAGNOSIS — L98.9 SKIN LESION: ICD-10-CM

## 2024-12-03 PROCEDURE — 72100 X-RAY EXAM L-S SPINE 2/3 VWS: CPT

## 2024-12-03 ASSESSMENT — PATIENT HEALTH QUESTIONNAIRE - PHQ9
2. FEELING DOWN, DEPRESSED OR HOPELESS: NOT AT ALL
SUM OF ALL RESPONSES TO PHQ QUESTIONS 1-9: 0
1. LITTLE INTEREST OR PLEASURE IN DOING THINGS: NOT AT ALL
SUM OF ALL RESPONSES TO PHQ9 QUESTIONS 1 & 2: 0

## 2024-12-03 ASSESSMENT — LIFESTYLE VARIABLES: HOW MANY STANDARD DRINKS CONTAINING ALCOHOL DO YOU HAVE ON A TYPICAL DAY: 3 OR 4

## 2024-12-03 NOTE — ASSESSMENT & PLAN NOTE
Within normal limits for age- retired, no ADL issues,no depression ,no cognitive impairment  Immunizations - due for COVID 19 booster, Prevnar 20, flu, Shingrix. Patient will get them at his local pharmacy.   Colonoscopy up to date, last on in 5/2024   Eye exam and dental up to date  Exercises as tolerated    No living will  Findings and recommendations discussed with Pt

## 2024-12-03 NOTE — PROGRESS NOTES
Medicare Annual Wellness Visit    Arpit Vinson is here for Medicare AWV (Other issues (pain))      Assessment & Plan   Medicare annual wellness visit, subsequent  Assessment & Plan:  Within normal limits for age- retired, no ADL issues,no depression ,no cognitive impairment  Immunizations - due for COVID 19 booster, Prevnar 20, flu, Shingrix. Patient will get them at his local pharmacy.   Colonoscopy up to date, last on in 5/2024   Eye exam and dental up to date  Exercises as tolerated    No living will  Findings and recommendations discussed with Pt    Acute right-sided low back pain without sciatica  Assessment & Plan:  Chronic with intermittent flares.   Discussed physical therapy in the past but pt see chiropractor.  Continue to have sx and flare up.   Went to urgent care 3 weeks ago.       Orders:  -     XR LUMBAR SPINE (2-3 VIEWS); Future  Skin lesion  -     Non [facility] - External Referral To Dermatology    Recommendations for Preventive Services Due: see orders and patient instructions/AVS.  Recommended screening schedule for the next 5-10 years is provided to the patient in written form: see Patient Instructions/AVS.     Return in about 6 months (around 6/3/2025) for Routine follow-up.     Subjective   The following acute and/or chronic problems were also addressed today:  See A&P   He is doing well.   Endorses pain today   See a chiropractor for his sciatica. Intermittent flares. However recently worsen. When he wakes up in the morning, very stiff at the buttock and hips. Takes sometime for him to loose up in the morning. Take Acetaminophen intermittently.   Went to urgent care and was given Norco, Zanaflex, and steroid. Which seem to provide him with good relief.   But now seems like it comes back. He sees a chiropractor but still sx despite manipulation.   He saw [facility] before. Had XR lumbar spine then.     Patient's complete Health Risk Assessment and screening values have been reviewed and

## 2024-12-03 NOTE — PATIENT INSTRUCTIONS

## 2024-12-03 NOTE — ASSESSMENT & PLAN NOTE
Chronic with intermittent flares.   Discussed physical therapy in the past but pt see chiropractor.  Continue to have sx and flare up.   Went to urgent care 3 weeks ago.

## 2024-12-05 ENCOUNTER — TELEPHONE (OUTPATIENT)
Dept: INTERNAL MEDICINE CLINIC | Age: 74
End: 2024-12-05

## 2024-12-05 DIAGNOSIS — M53.9 MULTILEVEL DEGENERATIVE DISC DISEASE: Primary | ICD-10-CM

## 2024-12-05 NOTE — TELEPHONE ENCOUNTER
Patient is asking for pain medication and forgot to ask when he was here recently. It's related to his back that he had the xray on. He knows not to take ibuprofen or tylenol and wants to know what to take if over the counter to a prescription.    Please call him to advise at: [phone]

## 2024-12-06 RX ORDER — METHOCARBAMOL 500 MG/1
500 TABLET, FILM COATED ORAL 3 TIMES DAILY
Qty: 30 TABLET | Refills: 0 | Status: SHIPPED | OUTPATIENT
Start: 2024-12-06 | End: 2024-12-16

## 2024-12-06 NOTE — TELEPHONE ENCOUNTER
I sent Robaxin to his pharmacy. If no improvement or not able to get in with Aaron then maybe can refer him to see ortho

## 2024-12-11 ENCOUNTER — HOSPITAL ENCOUNTER (OUTPATIENT)
Dept: PHYSICAL THERAPY | Age: 74
Setting detail: THERAPIES SERIES
Discharge: HOME | End: 2024-12-11
Payer: MEDICARE

## 2024-12-11 PROCEDURE — 97110 THERAPEUTIC EXERCISES: CPT

## 2024-12-11 PROCEDURE — 97161 PT EVAL LOW COMPLEX 20 MIN: CPT

## 2024-12-11 NOTE — THERAPY EVALUATION
therapy services:  The patient has functional impairments and/or activity limitations and would benefit from continued outpatient therapy services to address the deficits outlined in the patients goals    Return to Play: NA    Prognosis for POC: [x] Good [] Fair  [] Poor    Patient requires continued skilled intervention: [x] Yes  [] No      CHARGE CAPTURE     PT CHARGE GRID   CPT Code (TIMED) minutes # CPT Code (UNTIMED) #     Therex (43179)     EVAL:LOW (02156 - Typically 20 minutes face-to-face) 1    Neuromusc. Re-ed (51710)    Re-Eval (93356)     Manual (19781)    Estim Unattended (63569)     Ther. Act (82003)    Mech. Traction (65934)     Gait (95931)    Dry Needle 1-2 muscle (04396)     Aquatic Therex (81368)    Dry Needle 3+ muscle (53115)     Iontophoresis (32766)    VASO (84153)     Ultrasound (79757)    Group Therapy (01749)     Estim Attended (95665)    Canalith Repositioning (67579)     Physical Performance Test (54857)    Custom orthotic ()     Other:    Other:    Total Timed Code Tx Minutes 40 3  1     Total Treatment Minutes 55        Charge Justification:  (11436) THERAPEUTIC EXERCISE - Provided verbal/tactile cueing for activities related to strengthening, flexibility, endurance, ROM performed to prevent loss of range of motion, maintain or improve muscular strength or increase flexibility, following either an injury or surgery.   (79956) HOME EXERCISE PROGRAM - Reviewed/Progressed HEP activities related to strengthening, flexibility, endurance, ROM performed to prevent loss of range of motion, maintain or improve muscular strength or increase flexibility, following either an injury or surgery.    GOALS     Patient stated goal: To be able to paint his backdrops with less pain in his back.  [] Progressing: [] Met: [] Not Met: [] Adjusted    Therapist goals for Patient:   Short Term Goals: To be achieved in: 2 weeks  1. Independent in HEP and progression per patient tolerance, in order to prevent

## 2024-12-15 DIAGNOSIS — M54.50 ACUTE RIGHT-SIDED LOW BACK PAIN WITHOUT SCIATICA: Primary | ICD-10-CM

## 2024-12-16 RX ORDER — TIZANIDINE 2 MG/1
2 TABLET ORAL NIGHTLY
Qty: 30 TABLET | Refills: 0 | Status: SHIPPED | OUTPATIENT
Start: 2024-12-16

## 2024-12-16 NOTE — TELEPHONE ENCOUNTER
Last appointment: 12/3/2024  Next appointment: 6/3/2025  Last refill: 1 month ago (11/14/2024) by Sissy Evangelista MD   Requested Prescriptions     Pending Prescriptions Disp Refills    tiZANidine (ZANAFLEX) 2 MG tablet [Pharmacy Med Name: TIZANIDINE 2MG TABLETS] 30 tablet 0     Sig: TAKE 1 TABLET BY MOUTH EVERY NIGHT

## 2025-01-02 PROBLEM — Z00.00 MEDICARE ANNUAL WELLNESS VISIT, SUBSEQUENT: Status: RESOLVED | Noted: 2023-10-05 | Resolved: 2025-01-02

## 2025-01-08 ENCOUNTER — TELEPHONE (OUTPATIENT)
Dept: CASE MANAGEMENT | Age: 75
End: 2025-01-08

## 2025-01-08 NOTE — TELEPHONE ENCOUNTER
Patient due for annual CT Lung Screening. Reminder letter mailed.  Call [phone] to schedule.

## 2025-01-13 DIAGNOSIS — M54.50 ACUTE RIGHT-SIDED LOW BACK PAIN WITHOUT SCIATICA: ICD-10-CM

## 2025-01-13 RX ORDER — LEVOTHYROXINE SODIUM 25 UG/1
25 TABLET ORAL DAILY
Qty: 90 TABLET | Refills: 1 | Status: SHIPPED | OUTPATIENT
Start: 2025-01-13

## 2025-01-13 RX ORDER — TIZANIDINE 2 MG/1
2 TABLET ORAL NIGHTLY
Qty: 30 TABLET | Refills: 0 | Status: SHIPPED | OUTPATIENT
Start: 2025-01-13

## 2025-01-14 ENCOUNTER — OFFICE VISIT (OUTPATIENT)
Dept: ENT CLINIC | Age: 75
End: 2025-01-14
Payer: MEDICARE

## 2025-01-14 VITALS
BODY MASS INDEX: 27.85 KG/M2 | HEART RATE: 66 BPM | WEIGHT: 217 LBS | TEMPERATURE: 97.1 F | DIASTOLIC BLOOD PRESSURE: 72 MMHG | SYSTOLIC BLOOD PRESSURE: 124 MMHG | HEIGHT: 74 IN

## 2025-01-14 DIAGNOSIS — Z85.89 ENCOUNTER FOR FOLLOW-UP SURVEILLANCE OF HEAD AND NECK CANCER: ICD-10-CM

## 2025-01-14 DIAGNOSIS — C09.9 CARCINOMA OF TONSIL (HCC): Primary | ICD-10-CM

## 2025-01-14 DIAGNOSIS — Z08 ENCOUNTER FOR FOLLOW-UP SURVEILLANCE OF HEAD AND NECK CANCER: ICD-10-CM

## 2025-01-14 PROCEDURE — 1160F RVW MEDS BY RX/DR IN RCRD: CPT | Performed by: OTOLARYNGOLOGY

## 2025-01-14 PROCEDURE — 1123F ACP DISCUSS/DSCN MKR DOCD: CPT | Performed by: OTOLARYNGOLOGY

## 2025-01-14 PROCEDURE — 1036F TOBACCO NON-USER: CPT | Performed by: OTOLARYNGOLOGY

## 2025-01-14 PROCEDURE — 99213 OFFICE O/P EST LOW 20 MIN: CPT | Performed by: OTOLARYNGOLOGY

## 2025-01-14 PROCEDURE — 3078F DIAST BP <80 MM HG: CPT | Performed by: OTOLARYNGOLOGY

## 2025-01-14 PROCEDURE — G8417 CALC BMI ABV UP PARAM F/U: HCPCS | Performed by: OTOLARYNGOLOGY

## 2025-01-14 PROCEDURE — 3074F SYST BP LT 130 MM HG: CPT | Performed by: OTOLARYNGOLOGY

## 2025-01-14 PROCEDURE — 3017F COLORECTAL CA SCREEN DOC REV: CPT | Performed by: OTOLARYNGOLOGY

## 2025-01-14 PROCEDURE — G8427 DOCREV CUR MEDS BY ELIG CLIN: HCPCS | Performed by: OTOLARYNGOLOGY

## 2025-01-14 PROCEDURE — 1159F MED LIST DOCD IN RCRD: CPT | Performed by: OTOLARYNGOLOGY

## 2025-01-14 NOTE — PROGRESS NOTES
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  Pathology showed a strong p16 expression and the patient was treated with chemoradiation and has been free of disease since treatment.  Most recently seen by me in July of last year.  Continues to smoke 1/4 pack a day. No throat pain.  No odynophagia.  No airway concerns.    PAST MEDICAL HISTORY:   Social History     Tobacco Use   Smoking Status Former    Current packs/day: 0.25    Average packs/day: 1 pack/day for 41.1 years (40.3 ttl pk-yrs)    Types: Cigarettes    Start date: 12/7/2023    Passive exposure: Never   Smokeless Tobacco Never                                                    Social History     Substance and Sexual Activity   Alcohol Use Yes    Comment: pt states \"unable to say\" how many alcoholic drinks in 1 week                                                    Current Outpatient Medications:     levothyroxine (SYNTHROID) 25 MCG tablet, TAKE 1 TABLET BY MOUTH DAILY, Disp: 90 tablet, Rfl: 1    tiZANidine (ZANAFLEX) 2 MG tablet, TAKE 1 TABLET BY MOUTH EVERY NIGHT, Disp: 30 tablet, Rfl: 0    atorvastatin (LIPITOR) 40 MG tablet, TAKE 1 TABLET BY MOUTH DAILY, Disp: 90 tablet, Rfl: 1    atenolol (TENORMIN) 50 MG tablet, Take 1 tablet by mouth daily, Disp: 90 tablet, Rfl: 1    vitamin B-12 (CYANOCOBALAMIN) 100 MCG tablet, Take 0.5 tablets by mouth daily, Disp: , Rfl:     folic acid (FOLVITE) 1 MG tablet, Take 1 tablet by mouth daily, Disp: , Rfl:     apixaban (ELIQUIS) 5 MG TABS tablet, Take 1 tablet by mouth 2 times daily, Disp: 60 tablet, Rfl: 5    Milk Thistle 500 MG CAPS, Take by mouth, Disp: , Rfl:                                                  Past Medical History:   Diagnosis Date    Arthritis     Atrial fibrillation (HCC)     Cancer (HCC)     tonsil    Dental disease     pt unsure of this dx.

## 2025-02-12 DIAGNOSIS — M54.50 ACUTE RIGHT-SIDED LOW BACK PAIN WITHOUT SCIATICA: ICD-10-CM

## 2025-02-12 RX ORDER — TIZANIDINE 2 MG/1
2 TABLET ORAL NIGHTLY
Qty: 30 TABLET | Refills: 1 | Status: SHIPPED | OUTPATIENT
Start: 2025-02-12

## 2025-02-14 ENCOUNTER — TELEPHONE (OUTPATIENT)
Dept: ENT CLINIC | Age: 75
End: 2025-02-14

## 2025-02-20 ENCOUNTER — OFFICE VISIT (OUTPATIENT)
Dept: ENT CLINIC | Age: 75
End: 2025-02-20
Payer: MEDICARE

## 2025-02-20 VITALS
HEIGHT: 74 IN | TEMPERATURE: 97.7 F | HEART RATE: 69 BPM | SYSTOLIC BLOOD PRESSURE: 149 MMHG | WEIGHT: 220.8 LBS | DIASTOLIC BLOOD PRESSURE: 84 MMHG | BODY MASS INDEX: 28.34 KG/M2

## 2025-02-20 DIAGNOSIS — C09.9 CARCINOMA OF TONSIL (HCC): Primary | ICD-10-CM

## 2025-02-20 DIAGNOSIS — R22.1 MASS OF RIGHT SIDE OF NECK: ICD-10-CM

## 2025-02-20 DIAGNOSIS — R13.10 DYSPHAGIA, UNSPECIFIED TYPE: ICD-10-CM

## 2025-02-20 PROCEDURE — 1123F ACP DISCUSS/DSCN MKR DOCD: CPT | Performed by: OTOLARYNGOLOGY

## 2025-02-20 PROCEDURE — G8428 CUR MEDS NOT DOCUMENT: HCPCS | Performed by: OTOLARYNGOLOGY

## 2025-02-20 PROCEDURE — 3017F COLORECTAL CA SCREEN DOC REV: CPT | Performed by: OTOLARYNGOLOGY

## 2025-02-20 PROCEDURE — 3077F SYST BP >= 140 MM HG: CPT | Performed by: OTOLARYNGOLOGY

## 2025-02-20 PROCEDURE — 1036F TOBACCO NON-USER: CPT | Performed by: OTOLARYNGOLOGY

## 2025-02-20 PROCEDURE — 3079F DIAST BP 80-89 MM HG: CPT | Performed by: OTOLARYNGOLOGY

## 2025-02-20 PROCEDURE — 1126F AMNT PAIN NOTED NONE PRSNT: CPT | Performed by: OTOLARYNGOLOGY

## 2025-02-20 PROCEDURE — G8417 CALC BMI ABV UP PARAM F/U: HCPCS | Performed by: OTOLARYNGOLOGY

## 2025-02-20 PROCEDURE — 99213 OFFICE O/P EST LOW 20 MIN: CPT | Performed by: OTOLARYNGOLOGY

## 2025-02-20 ASSESSMENT — ENCOUNTER SYMPTOMS
VOICE CHANGE: 0
SINUS PAIN: 0
SINUS PRESSURE: 0
CHOKING: 0
EYE REDNESS: 0
EYE PAIN: 0
FACIAL SWELLING: 0
SORE THROAT: 0
TROUBLE SWALLOWING: 1
NAUSEA: 0
RHINORRHEA: 0
COUGH: 0
SHORTNESS OF BREATH: 0
DIARRHEA: 0
EYE ITCHING: 0

## 2025-02-20 NOTE — PROGRESS NOTES
Subjective:      Patient ID: Arpit Vinson is a 74 y.o. male.    HPI  Chief Complaint   Patient presents with    choking episodes       History of Present Illness  Head/Neck    Arpit is a(n) 74 y.o. male who presents with a history of tonsil cancer. Completed therapy in 3-2024. Chemo-radiation. SCCa/HPV+. Doing well but has had two episodes of choking on solid foods and felt like a spasm in throat. Relieved by muscle relaxant. Wanted checked.   Pain: No  Otalgia: No  Odynophagia: No  Dysphagia: Yes  Voice Changes: No  Lumps in neck: No  Modifying Factors: S/P tonsil and neck radiation  Associates Signs and Symptoms: none    Patient Active Problem List   Diagnosis    Primary hypertension    Pure hypercholesterolemia    Viral hepatitis C    Tobacco dependence syndrome    Class 1 obesity due to excess calories with serious comorbidity and body mass index (BMI) of 30.0 to 30.9 in adult    Benign prostatic hyperplasia (BPH) with urinary urgency    Bruising    Atherosclerosis of native coronary artery of native heart    Alcoholic cirrhosis of liver without ascites (HCC)    Pain and swelling of lower extremity    Chronic pain of both knees    Left hand pain    Chronic gout of left foot    Weight gain    Hyperglycemia    Anemia, unspecified    Gum symptoms    Sprain of temporomandibular joint or ligament    Lymphadenopathy    Tonsillar mass    Neck mass    Alcohol abuse, daily use    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

## 2025-03-03 ENCOUNTER — OFFICE VISIT (OUTPATIENT)
Dept: FAMILY MEDICINE CLINIC | Age: 75
End: 2025-03-03

## 2025-03-03 VITALS
OXYGEN SATURATION: 99 % | HEIGHT: 74 IN | TEMPERATURE: 98 F | SYSTOLIC BLOOD PRESSURE: 122 MMHG | HEART RATE: 72 BPM | WEIGHT: 220 LBS | BODY MASS INDEX: 28.23 KG/M2 | DIASTOLIC BLOOD PRESSURE: 80 MMHG

## 2025-03-03 DIAGNOSIS — M25.512 CHRONIC LEFT SHOULDER PAIN: Primary | ICD-10-CM

## 2025-03-03 DIAGNOSIS — I48.0 PAROXYSMAL ATRIAL FIBRILLATION (HCC): ICD-10-CM

## 2025-03-03 DIAGNOSIS — J11.1 INFLUENZA-LIKE ILLNESS: ICD-10-CM

## 2025-03-03 DIAGNOSIS — G89.29 CHRONIC LEFT SHOULDER PAIN: Primary | ICD-10-CM

## 2025-03-03 LAB
INFLUENZA A ANTIBODY: NORMAL
INFLUENZA B ANTIBODY: NORMAL

## 2025-03-03 SDOH — ECONOMIC STABILITY: FOOD INSECURITY: WITHIN THE PAST 12 MONTHS, THE FOOD YOU BOUGHT JUST DIDN'T LAST AND YOU DIDN'T HAVE MONEY TO GET MORE.: NEVER TRUE

## 2025-03-03 SDOH — ECONOMIC STABILITY: FOOD INSECURITY: WITHIN THE PAST 12 MONTHS, YOU WORRIED THAT YOUR FOOD WOULD RUN OUT BEFORE YOU GOT MONEY TO BUY MORE.: NEVER TRUE

## 2025-03-03 ASSESSMENT — PATIENT HEALTH QUESTIONNAIRE - PHQ9
SUM OF ALL RESPONSES TO PHQ QUESTIONS 1-9: 0
SUM OF ALL RESPONSES TO PHQ QUESTIONS 1-9: 0
2. FEELING DOWN, DEPRESSED OR HOPELESS: NOT AT ALL
SUM OF ALL RESPONSES TO PHQ QUESTIONS 1-9: 0
1. LITTLE INTEREST OR PLEASURE IN DOING THINGS: NOT AT ALL
SUM OF ALL RESPONSES TO PHQ QUESTIONS 1-9: 0

## 2025-03-03 NOTE — PROGRESS NOTES
Pharynx: Posterior oropharyngeal erythema present.   Eyes:      Pupils: Pupils are equal, round, and reactive to light.   Cardiovascular:      Rate and Rhythm: Normal rate and regular rhythm.      Heart sounds: Normal heart sounds.   Pulmonary:      Effort: Pulmonary effort is normal.      Breath sounds: Normal breath sounds.   Musculoskeletal:         General: Normal range of motion.      Cervical back: Normal range of motion.   Skin:     General: Skin is warm and dry.   Neurological:      Mental Status: He is alert and oriented to person, place, and time.       Physical Exam      The patient (or guardian, if applicable) and other individuals in attendance with the patient were advised that Artificial Intelligence will be utilized during this visit to record, process the conversation to generate a clinical note, and support improvement of the AI technology. The patient (or guardian, if applicable) and other individuals in attendance at the appointment consented to the use of AI, including the recording.          An electronic signature was used to authenticate this note.    --ROSALINA MADERA, MARLON - CNP

## 2025-04-08 RX ORDER — ATENOLOL 50 MG/1
50 TABLET ORAL DAILY
Qty: 90 TABLET | Refills: 1 | Status: SHIPPED | OUTPATIENT
Start: 2025-04-08

## 2025-04-10 DIAGNOSIS — M54.50 ACUTE RIGHT-SIDED LOW BACK PAIN WITHOUT SCIATICA: ICD-10-CM

## 2025-04-10 RX ORDER — TIZANIDINE 2 MG/1
2 TABLET ORAL NIGHTLY
Qty: 30 TABLET | Refills: 2 | Status: SHIPPED | OUTPATIENT
Start: 2025-04-10

## 2025-05-01 ENCOUNTER — OFFICE VISIT (OUTPATIENT)
Dept: ENT CLINIC | Age: 75
End: 2025-05-01
Payer: MEDICARE

## 2025-05-01 VITALS
HEIGHT: 74 IN | BODY MASS INDEX: 27.21 KG/M2 | HEART RATE: 65 BPM | SYSTOLIC BLOOD PRESSURE: 136 MMHG | TEMPERATURE: 98.2 F | DIASTOLIC BLOOD PRESSURE: 91 MMHG | WEIGHT: 212 LBS

## 2025-05-01 DIAGNOSIS — C09.9 CARCINOMA OF TONSIL (HCC): ICD-10-CM

## 2025-05-01 DIAGNOSIS — R13.10 DYSPHAGIA, UNSPECIFIED TYPE: Primary | ICD-10-CM

## 2025-05-01 PROCEDURE — 3080F DIAST BP >= 90 MM HG: CPT | Performed by: OTOLARYNGOLOGY

## 2025-05-01 PROCEDURE — 3017F COLORECTAL CA SCREEN DOC REV: CPT | Performed by: OTOLARYNGOLOGY

## 2025-05-01 PROCEDURE — 3075F SYST BP GE 130 - 139MM HG: CPT | Performed by: OTOLARYNGOLOGY

## 2025-05-01 PROCEDURE — 99213 OFFICE O/P EST LOW 20 MIN: CPT | Performed by: OTOLARYNGOLOGY

## 2025-05-01 PROCEDURE — 1159F MED LIST DOCD IN RCRD: CPT | Performed by: OTOLARYNGOLOGY

## 2025-05-01 PROCEDURE — 1126F AMNT PAIN NOTED NONE PRSNT: CPT | Performed by: OTOLARYNGOLOGY

## 2025-05-01 PROCEDURE — 1123F ACP DISCUSS/DSCN MKR DOCD: CPT | Performed by: OTOLARYNGOLOGY

## 2025-05-01 PROCEDURE — G8417 CALC BMI ABV UP PARAM F/U: HCPCS | Performed by: OTOLARYNGOLOGY

## 2025-05-01 PROCEDURE — G8427 DOCREV CUR MEDS BY ELIG CLIN: HCPCS | Performed by: OTOLARYNGOLOGY

## 2025-05-01 PROCEDURE — 1036F TOBACCO NON-USER: CPT | Performed by: OTOLARYNGOLOGY

## 2025-05-01 RX ORDER — ASCORBIC ACID 1000 MG
TABLET ORAL
COMMUNITY

## 2025-05-01 ASSESSMENT — ENCOUNTER SYMPTOMS
SORE THROAT: 0
TROUBLE SWALLOWING: 1
EYE REDNESS: 0
DIARRHEA: 0
EYE ITCHING: 0
COUGH: 0
SINUS PAIN: 0
FACIAL SWELLING: 0
SINUS PRESSURE: 0
VOICE CHANGE: 0
RHINORRHEA: 0
CHOKING: 0
NAUSEA: 0
SHORTNESS OF BREATH: 0
EYE PAIN: 0

## 2025-05-01 NOTE — PROGRESS NOTES
Subjective:      Patient ID: Arpit Vinson is a 74 y.o. male.    HPI  Chief Complaint   Patient presents with    swallowing problems       History of Present Illness  Head/Neck    Arpit is a(n) 74 y.o. male who presents with a history of tonsil SCCa, HPV+, s/p chemo-radiation. Saw in February. ALF exam. Was having choking episodes when eating. Relieved by muscle relaxants. Did not see SLP as recommended. Did not work with SLP during therapy.     Patient Active Problem List   Diagnosis    Primary hypertension    Pure hypercholesterolemia    Viral hepatitis C    Tobacco dependence syndrome    Class 1 obesity due to excess calories with serious comorbidity and body mass index (BMI) of 30.0 to 30.9 in adult    Benign prostatic hyperplasia (BPH) with urinary urgency    Bruising    Atherosclerosis of native coronary artery of native heart    Alcoholic cirrhosis of liver without ascites (HCC)    Pain and swelling of lower extremity    Chronic pain of both knees    Left hand pain    Chronic gout of left foot    Weight gain    Hyperglycemia    Anemia, unspecified    Gum symptoms    Sprain of temporomandibular joint or ligament    Lymphadenopathy    Tonsillar mass    Neck mass    Alcohol abuse, daily use    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

## 2025-05-06 ENCOUNTER — HOSPITAL ENCOUNTER (OUTPATIENT)
Dept: CT IMAGING | Age: 75
Discharge: HOME | End: 2025-05-06
Attending: RADIOLOGY
Payer: MEDICARE

## 2025-05-06 DIAGNOSIS — C09.9 MALIGNANT NEOPLASM OF TONSIL, UNSPECIFIED (HCC): ICD-10-CM

## 2025-05-06 PROCEDURE — 70491 CT SOFT TISSUE NECK W/DYE: CPT

## 2025-05-06 PROCEDURE — 6360000004 HC RX CONTRAST MEDICATION: Performed by: RADIOLOGY

## 2025-05-06 RX ORDER — IOPAMIDOL 755 MG/ML
75 INJECTION, SOLUTION INTRAVASCULAR
Status: COMPLETED | OUTPATIENT
Start: 2025-05-06 | End: 2025-05-06

## 2025-05-06 RX ADMIN — IOPAMIDOL 75 ML: 755 INJECTION, SOLUTION INTRAVENOUS at 14:05

## 2025-06-10 ENCOUNTER — HOSPITAL ENCOUNTER (OUTPATIENT)
Dept: CT IMAGING | Age: 75
Discharge: HOME | End: 2025-06-10
Attending: RADIOLOGY
Payer: MEDICARE

## 2025-06-10 DIAGNOSIS — C09.9 TONSIL CANCER (HCC): ICD-10-CM

## 2025-06-10 PROCEDURE — 71250 CT THORAX DX C-: CPT

## 2025-06-11 ENCOUNTER — APPOINTMENT (OUTPATIENT)
Dept: CT IMAGING | Age: 75
End: 2025-06-11
Payer: MEDICARE

## 2025-06-11 ENCOUNTER — HOSPITAL ENCOUNTER (EMERGENCY)
Age: 75
Discharge: HOME | End: 2025-06-11
Attending: EMERGENCY MEDICINE
Payer: MEDICARE

## 2025-06-11 ENCOUNTER — APPOINTMENT (OUTPATIENT)
Dept: GENERAL RADIOLOGY | Age: 75
End: 2025-06-11
Attending: EMERGENCY MEDICINE
Payer: MEDICARE

## 2025-06-11 ENCOUNTER — CLINICAL DOCUMENTATION (OUTPATIENT)
Dept: SPEECH THERAPY | Age: 75
End: 2025-06-11

## 2025-06-11 VITALS
HEART RATE: 65 BPM | RESPIRATION RATE: 19 BRPM | DIASTOLIC BLOOD PRESSURE: 93 MMHG | BODY MASS INDEX: 27.22 KG/M2 | OXYGEN SATURATION: 100 % | TEMPERATURE: 97.9 F | SYSTOLIC BLOOD PRESSURE: 129 MMHG | HEIGHT: 74 IN

## 2025-06-11 DIAGNOSIS — S01.81XA FACIAL LACERATION, INITIAL ENCOUNTER: ICD-10-CM

## 2025-06-11 DIAGNOSIS — I95.1 ORTHOSTATIC SYNCOPE: Primary | ICD-10-CM

## 2025-06-11 LAB
ALBUMIN SERPL-MCNC: 4.6 G/DL (ref 3.4–5)
ALBUMIN/GLOB SERPL: 1.8 {RATIO} (ref 1.1–2.2)
ALP SERPL-CCNC: 73 U/L (ref 40–129)
ALT SERPL-CCNC: 14 U/L (ref 10–40)
ANION GAP SERPL CALCULATED.3IONS-SCNC: 14 MMOL/L (ref 3–16)
AST SERPL-CCNC: 26 U/L (ref 15–37)
BASOPHILS # BLD: 0 K/UL (ref 0–0.2)
BASOPHILS NFR BLD: 0.5 %
BILIRUB SERPL-MCNC: 0.6 MG/DL (ref 0–1)
BUN SERPL-MCNC: 25 MG/DL (ref 7–20)
CALCIUM SERPL-MCNC: 9.5 MG/DL (ref 8.3–10.6)
CHLORIDE SERPL-SCNC: 98 MMOL/L (ref 99–110)
CO2 SERPL-SCNC: 21 MMOL/L (ref 21–32)
CREAT SERPL-MCNC: 1.3 MG/DL (ref 0.8–1.3)
DEPRECATED RDW RBC AUTO: 13.8 % (ref 12.4–15.4)
EOSINOPHIL # BLD: 0.2 K/UL (ref 0–0.6)
EOSINOPHIL NFR BLD: 4.5 %
GFR SERPLBLD CREATININE-BSD FMLA CKD-EPI: 58 ML/MIN/{1.73_M2}
GLUCOSE SERPL-MCNC: 89 MG/DL (ref 70–99)
HCT VFR BLD AUTO: 30.4 % (ref 40.5–52.5)
HGB BLD-MCNC: 10.7 G/DL (ref 13.5–17.5)
LYMPHOCYTES # BLD: 0.7 K/UL (ref 1–5.1)
LYMPHOCYTES NFR BLD: 18.7 %
MCH RBC QN AUTO: 34 PG (ref 26–34)
MCHC RBC AUTO-ENTMCNC: 35.3 G/DL (ref 31–36)
MCV RBC AUTO: 96.3 FL (ref 80–100)
MONOCYTES # BLD: 0.3 K/UL (ref 0–1.3)
MONOCYTES NFR BLD: 8.5 %
NEUTROPHILS # BLD: 2.5 K/UL (ref 1.7–7.7)
NEUTROPHILS NFR BLD: 67.8 %
PLATELET # BLD AUTO: 115 K/UL (ref 135–450)
PMV BLD AUTO: 6.3 FL (ref 5–10.5)
POTASSIUM SERPL-SCNC: 4.1 MMOL/L (ref 3.5–5.1)
PROT SERPL-MCNC: 7.2 G/DL (ref 6.4–8.2)
RBC # BLD AUTO: 3.16 M/UL (ref 4.2–5.9)
SODIUM SERPL-SCNC: 133 MMOL/L (ref 136–145)
WBC # BLD AUTO: 3.7 K/UL (ref 4–11)

## 2025-06-11 PROCEDURE — 80053 COMPREHEN METABOLIC PANEL: CPT

## 2025-06-11 PROCEDURE — 12014 RPR F/E/E/N/L/M 5.1-7.5 CM: CPT

## 2025-06-11 PROCEDURE — 6370000000 HC RX 637 (ALT 250 FOR IP): Performed by: EMERGENCY MEDICINE

## 2025-06-11 PROCEDURE — 93005 ELECTROCARDIOGRAM TRACING: CPT | Performed by: EMERGENCY MEDICINE

## 2025-06-11 PROCEDURE — 70450 CT HEAD/BRAIN W/O DYE: CPT

## 2025-06-11 PROCEDURE — 99285 EMERGENCY DEPT VISIT HI MDM: CPT

## 2025-06-11 PROCEDURE — 85025 COMPLETE CBC W/AUTO DIFF WBC: CPT

## 2025-06-11 PROCEDURE — 71045 X-RAY EXAM CHEST 1 VIEW: CPT

## 2025-06-11 RX ORDER — IOPAMIDOL 755 MG/ML
75 INJECTION, SOLUTION INTRAVASCULAR
Status: DISCONTINUED | OUTPATIENT
Start: 2025-06-11 | End: 2025-06-11 | Stop reason: HOSPADM

## 2025-06-11 RX ORDER — LIDOCAINE HYDROCHLORIDE AND EPINEPHRINE 10; 10 MG/ML; UG/ML
20 INJECTION, SOLUTION INFILTRATION; PERINEURAL ONCE
Status: DISCONTINUED | OUTPATIENT
Start: 2025-06-11 | End: 2025-06-11 | Stop reason: HOSPADM

## 2025-06-11 RX ADMIN — Medication 3 ML: at 12:25

## 2025-06-11 ASSESSMENT — PAIN DESCRIPTION - DESCRIPTORS: DESCRIPTORS: ACHING

## 2025-06-11 ASSESSMENT — PAIN - FUNCTIONAL ASSESSMENT: PAIN_FUNCTIONAL_ASSESSMENT: 0-10

## 2025-06-11 ASSESSMENT — PAIN DESCRIPTION - FREQUENCY: FREQUENCY: CONTINUOUS

## 2025-06-11 ASSESSMENT — PAIN DESCRIPTION - PAIN TYPE: TYPE: ACUTE PAIN

## 2025-06-11 ASSESSMENT — PAIN SCALES - GENERAL: PAINLEVEL_OUTOF10: 4

## 2025-06-11 ASSESSMENT — PAIN DESCRIPTION - LOCATION: LOCATION: HEAD

## 2025-06-11 NOTE — ED NOTES
Orthostatic Vitals    Laying:  -Blood Pressure: 150/91  -Pulse: 60    Sitting:  -Blood Pressure: 151/95  Pulse: 63    Standing  -Blood Pressure: 129/93  -Pulse: 67     Pardeep Krishnan RN  06/11/25 1228

## 2025-06-11 NOTE — ED PROVIDER NOTES
THE [facility]  EMERGENCY DEPARTMENT ENCOUNTER          ATTENDING PHYSICIAN NOTE       Date of evaluation: 6/11/2025    Chief Complaint     Fall      History of Present Illness     Arpit Vinson is a 74 y.o. male who presents after a fall.    He sustained a cut on his head after falling at 4:00 AM. The fall occurred when he stood up quickly after using the bathroom, resulting in dizziness and a blackout. He lost consciousness, fell, and hit his head, waking up upon impact and then passing out again for a couple of minutes. He attempted to clean the wound himself before seeking care at an urgent care center, which was unable to treat his head trauma.    He has a history of cancer treatment, which he completed approximately six months ago. He has experienced syncope previously during chemotherapy. He is currently in a round of follow-up appointments, although his primary care physician recently had to cancel an appointment.    No neck pain but a slight headache following the fall. He is unsure of the exact date of his last tetanus shot but states he has received them regularly. He was recently  about a month ago.    ASSESSMENT / PLAN  (MEDICAL DECISION MAKING)     INITIAL VITALS: BP: (!) 147/93, Temp: 97.9 °F (36.6 °C), Pulse: 61, Respirations: 18, SpO2: 100 %      74-year-old male who presents with an episode of orthostatic syncope last night.  Does have a 7 cm facial laceration.  Repaired at bedside.  Suture removal discussed.  EKG does show sinus bradycardia with first-degree AV block but he is not symptomatic at this time.  Orthostatics negative.  Labs only show a mild leukopenia with lymphopenia.  He is afebrile and without any particular infectious symptoms.  Will have him follow-up with PCP for this.  Renal panel unchanged from prior.  CT head unremarkable.  X-ray of his chest did show a possible opacity that I suspect is atelectasis.  Thoroughly discussed this with the patient and low  [facility] Progress Note     Hemodialysis treatment ordered today per Dr. Agee x 2 hours.   Treatment initiated at 1523, ended at 1723.      Pt had episodes of low BP during HD, pt asymptomatic. 100 mL NS bolus given and UF off. BP improved. Nephrologist updated.  See paper flow sheet for details.      Net UF 0 mL.      Post tx, CVC flushed with saline then locked with heparin 1000 units/mL per designated amount in each wing then clamped and capped. Aspirate heparin prior to next CVC use.     Report given to Primary RN.         (36.6 °C), Pulse: 61, Respirations: 18, SpO2: 100 %   GENERAL: Well-developed, well-nourished, no acute distress.  HEENT: Jagged superficial laceration about 7 cm of the forehead crossing in to the nasal bridge as pictured below which is hemostatic, no scleral icterus, mucous membranes moist, neck supple.  No tenderness to palpation of the cervical spine and normal range of motion.  CARDIOVASCULAR: Regular rate and rhythm, no murmur.  PULMONARY: Lungs clear to auscultation bilaterally with no wheezes, rhonchi, or rales.  GASTROINTESTINAL: Abdomen soft, nontender, and nondistended.  MUSCULOSKELETAL: No gross deformities or tenderness to palpation and no edema.  NEUROLOGIC: Awake, alert, and oriented x 4, no facial droop, no dysarthria, moves all 4 extremities spontaneously with symmetric strength.  SKIN: No rashes, skin tear on left elbow.               Jan Robledo MD  06/11/25 0822

## 2025-06-11 NOTE — PROGRESS NOTES
[facility] ENT SLP  Late Cancellation/No-show Note  Name: Arpit Vinson  YOB: 1950  MRN: [id number]  Cancelled visits to date:   No-shows to date:     OP attendance policy states 2 consecutive no-shows or 3 missed sessions within the last 60 days may result in discharge from therapy. Cancellation less than 12 hours before the appointment time is considered a no-show. Additionally, evaluation or therapy may be unable to be provided if patient arrives late by 15 minutes or more.     For today's appointment patient:  [x]  Late Cancelled  []  Rescheduled appointment  []  No-show     Reason given by patient:  []  Patient ill  [x]  Conflicting appointment  []  No transportation                          []  Conflict with work  []  No reason given  []  Other:                Comments:        Thank you,    Norma Becker MA (Katie), CCC-SLP; [id number]  Speech-Language Pathologist

## 2025-06-11 NOTE — DISCHARGE INSTRUCTIONS
You should follow up with your primary care provider regarding episode of passing out.  Use caution with positional changes and be careful that you do not fall again.  Have your sutures removed in 1 week.  Return to the emergency department for new or worsening symptoms

## 2025-06-11 NOTE — ED TRIAGE NOTES
Pt describes a syncopal episode around 0400 this morning while getting up from the bathroom to go back to bed. Pt reports falling forward and coming to on the floor of bathroom with blood on the floor from laceration to face. Pt co HA pain. Pt reports hx of syncopal episodes with getting up too fast. Pt reports only wanting to be evaluated for facial laceration if needed to be stitched or not \"I don't think anything else needs attention\".

## 2025-06-12 ENCOUNTER — CARE COORDINATION (OUTPATIENT)
Dept: CARE COORDINATION | Age: 75
End: 2025-06-12

## 2025-06-12 LAB
EKG ATRIAL RATE: 59 BPM
EKG DIAGNOSIS: NORMAL
EKG P AXIS: 66 DEGREES
EKG P-R INTERVAL: 214 MS
EKG Q-T INTERVAL: 458 MS
EKG QRS DURATION: 96 MS
EKG QTC CALCULATION (BAZETT): 453 MS
EKG R AXIS: 51 DEGREES
EKG T AXIS: 61 DEGREES
EKG VENTRICULAR RATE: 59 BPM

## 2025-06-12 NOTE — CARE COORDINATION
Ambulatory Care Coordination Note     6/12/2025 12:35 PM     Patient outreach attempt by this ACM today to offer care management services. ACM was unable to reach the patient by telephone today;   left voice message requesting a return phone call to this ACM.     ACM: Tierra Breaux RN     Care Summary Note:     PCP/Specialist follow up:   Future Appointments         Provider Specialty Dept Phone    7/1/2025 2:30 PM Sissy Evangelista MD Internal Medicine [phone]    7/16/2025 9:00 AM Chris Patel MD Otolaryngology [phone]    10/7/2025 11:45 AM Deepak Daley MD Nephrology [phone]            Follow Up:   Plan for next AC outreach in approximately 1-2 days  to complete:  - outreach attempt to offer care management services.

## 2025-06-16 ENCOUNTER — CARE COORDINATION (OUTPATIENT)
Dept: CARE COORDINATION | Age: 75
End: 2025-06-16

## 2025-06-16 NOTE — CARE COORDINATION
Ambulatory Care Coordination Note     6/16/2025 12:45 PM     Patient outreach attempt by this ACM today to offer care management services. ACM was unable to reach the patient by telephone today;   left voice message requesting a return phone call to this ACM. Attempt x 2      ACM: Tierra Breaux, RN     Care Summary Note:     PCP/Specialist follow up:   Future Appointments         Provider Specialty Dept Phone    6/19/2025 2:30 PM LULY VOGEL Internal Medicine [phone]    7/1/2025 2:30 PM Sissy Evangelista MD Internal Medicine [phone]    7/16/2025 9:00 AM Chris Patel MD Otolaryngology [phone]    10/7/2025 11:45 AM Deepak Daley MD Nephrology [phone]            Follow Up:   Plan for next ACM outreach in approximately 1-2 days  to complete:  - outreach attempt to offer care management services.

## 2025-06-16 NOTE — CARE COORDINATION
Ambulatory Care Coordination Note     2025 2:19 PM     Patient Current Location:  Home: [address] [place] [address] [place]  [facility] [address]     This patient was received as a referral from [facility] .    ACM contacted the patient by telephone. Verified name and  with patient as identifiers. Provided introduction to self, and explanation of the ACM role.   Patient accepted care management services at this time.          ACM: Tierra Breaux RN     Challenges to be reviewed by the provider   Additional needs identified to be addressed with provider Yes  Pt wants to know if he can possibly combine his appt on  with his 6 month follow up scheduled on .  I told him Dr. Evangelista's schedule is usually pretty busy but I could ask.  He wants to know if it would be okay to push back his appt on  if not as he has an eye dr appt that same day that he's had to reschedule multiple times.  Can someone please reach out to him regarding his appt?  Thanks!               Method of communication with provider: chart routing.    Utilization: Initial Call - Discharge Date: 25   Discharge Facility: [facility]  Reason for ED Visit: Fall  Visit Diagnosis: orthostatic syncope/facial laceration    Number of ED visits in the last 6 months: 1      Do you have any ongoing symptoms? No  Did you call your PCP prior to going to the ED? Sent to the ED from an Urgent Care center.     Review of Discharge Instructions:   [x] AVS discharge instructions  [x] Right Care, Right Place, Right Time document  [x] Medication changes  [x] Follow up appointments         Care Summary Note: ACM completed ED follow up outreach with pt.  Reports he is doing okay.  States he was up in the middle of the night using the bathroom which he typically does and he doesn't know if was just half asleep and tripped or if his sciatica pain made him fall.  States he hit his head and lost consciousness and woke up in a pool of blood. Pt

## 2025-06-18 NOTE — TELEPHONE ENCOUNTER
I called the patient and spoke to him directly to try and assist him in rescheduling his appointment.    However, the information given does not seem to be what the patient is asking.    He wanted to move his July 1st appointment to tomorrow with  , I explained to him that this would not be possible as her schedule is full.    The patient is very condescending in his tone during this conversation and even ask \" do you have communication issues\".    After explaining that seeing  for his July 1st appointment tomorrow on June 19th unfortunately would not be possible because her schedule is full.    The patient just sat on the phone and then hung up.

## 2025-06-18 NOTE — TELEPHONE ENCOUNTER
I see. Sorry if I did not read your message carefully. I can see him tomorrow for a 6 month follow up visit. Most likely he does not need to come back in July unless any significant complains/issues. He can cancel the appointment on 7/1 for now. Will see him tomorrow.

## 2025-06-18 NOTE — TELEPHONE ENCOUNTER
I understood it to be the same request as you.    I offered to move his July 1st appointment so he did not have to move his eye dr appointment.    That is when he ask if I had a communication problem.    So he did not want to move his July appointment back, I assume.    Sorry we both were not able to find any resolution to his request.     Let me know if there is anything else I can help you with for him.

## 2025-06-27 DIAGNOSIS — M54.50 ACUTE RIGHT-SIDED LOW BACK PAIN WITHOUT SCIATICA: ICD-10-CM

## 2025-06-30 NOTE — TELEPHONE ENCOUNTER
Last appointment: 12/3/2024  Next appointment: 7/1/2025        Last refill: (4/10/2025) by Sissy Evangelista MD

## 2025-07-01 ENCOUNTER — OFFICE VISIT (OUTPATIENT)
Dept: INTERNAL MEDICINE CLINIC | Age: 75
End: 2025-07-01

## 2025-07-01 VITALS
SYSTOLIC BLOOD PRESSURE: 138 MMHG | DIASTOLIC BLOOD PRESSURE: 84 MMHG | OXYGEN SATURATION: 98 % | BODY MASS INDEX: 26.71 KG/M2 | WEIGHT: 208 LBS | HEART RATE: 67 BPM

## 2025-07-01 DIAGNOSIS — C77.0 MALIGNANT NEOPLASM METASTATIC TO LYMPH NODE OF NECK (HCC): ICD-10-CM

## 2025-07-01 DIAGNOSIS — R13.10 DYSPHAGIA, UNSPECIFIED TYPE: ICD-10-CM

## 2025-07-01 DIAGNOSIS — G89.29 CHRONIC RIGHT-SIDED LOW BACK PAIN WITHOUT SCIATICA: Primary | ICD-10-CM

## 2025-07-01 DIAGNOSIS — K74.69 OTHER CIRRHOSIS OF LIVER (HCC): ICD-10-CM

## 2025-07-01 DIAGNOSIS — M54.50 CHRONIC RIGHT-SIDED LOW BACK PAIN WITHOUT SCIATICA: Primary | ICD-10-CM

## 2025-07-01 DIAGNOSIS — K94.20 COMPLICATION OF GASTROSTOMY TUBE (HCC): ICD-10-CM

## 2025-07-01 PROBLEM — D70.9 NEUTROPENIC SEPSIS (HCC): Status: RESOLVED | Noted: 2024-01-23 | Resolved: 2025-07-01

## 2025-07-01 PROBLEM — A41.9 NEUTROPENIC SEPSIS (HCC): Status: RESOLVED | Noted: 2024-01-23 | Resolved: 2025-07-01

## 2025-07-01 PROBLEM — M79.642 LEFT HAND PAIN: Status: RESOLVED | Noted: 2022-08-23 | Resolved: 2025-07-01

## 2025-07-01 RX ORDER — TIZANIDINE 2 MG/1
2 TABLET ORAL NIGHTLY
Qty: 30 TABLET | Refills: 2 | Status: SHIPPED | OUTPATIENT
Start: 2025-07-01

## 2025-07-01 NOTE — PROGRESS NOTES
Arpit Vinson (:  1950) is a 74 y.o. male here for evaluation of the following chief complaint(s):  6 Month Follow-Up, Back Pain, and Difficulty swallowing       Assessment & Plan     Assessment & Plan  Chronic right-sided low back pain without sciatica  Chronic with intermittent flares.   Has been seeing a chiropractor improving initially but has been acting   He saw [facility] in the past  Discussed physical therapy in the past - he reports did PT at his chiropractor without much improvement (actually sometime even worse)    - Will get MRI lumbar spine   - Referred to Dr. 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 Ashby, [facility]   Dx in 2023, s/p surgery, is getting radiation, chemo  Now complains of dysphagia - pending eval with ENT  No needed will also consider GI       History of Present Illness  The patient presents for evaluation of sciatica, dysphagia, and cirrhosis.    He is seeking treatment for his sciatica, which has been causing him significant discomfort. The pain is constant, although it occasionally subsides slightly. His chiropractor has referred him to three specialists. He recalls a previous visit to the [facility] several years ago for the same issue, where he was diagnosed with problems in a couple of vertebrae. Surgery was suggested as a potential solution, but he was advised to try chiropractic treatment first. After several sessions with Dr. West Kinsey at [facility], his symptoms improved, allowing him to return to work. However, the pain recurred last year during a job in [place] that involved frequent bending over. Since then, he has continued chiropractic treatment. He experiences severe leg pain on some days, which can cause his leg to give

## 2025-07-01 NOTE — ASSESSMENT & PLAN NOTE
Does have history of chronic hep c   Saw GI in the past - will have him follow up with them again soon need continue monitor   Compensated

## 2025-07-01 NOTE — ASSESSMENT & PLAN NOTE
Chronic with intermittent flares.   Has been seeing a chiropractor improving initially but has been acting   He saw [facility] in the past  Discussed physical therapy in the past - he reports did PT at his chiropractor without much improvement (actually sometime even worse)    - Will get MRI lumbar spine   - Referred to Dr. Sahu

## 2025-07-02 ENCOUNTER — CARE COORDINATION (OUTPATIENT)
Dept: CARE COORDINATION | Age: 75
End: 2025-07-02

## 2025-07-02 NOTE — CARE COORDINATION
Ambulatory Care Coordination Note     7/2/2025 11:49 AM     Patient outreach attempt by this ACM today to perform care management follow up . ACM was unable to reach the patient by telephone today;   left voice message requesting a return phone call to this ACM.     ACM: Tierra Breaux RN     Care Summary Note:     PCP/Specialist follow up:   Future Appointments         Provider Specialty Dept Phone    7/3/2025 2:30 PM (Arrive by 2:00 PM) [facility] RM 1 Radiology [phone]    7/16/2025 9:00 AM Chris Patel MD Otolaryngology [phone]    10/7/2025 11:45 AM Deepak Daley MD Nephrology [phone]            Follow Up:   Plan for next AC outreach in approximately 1 week to complete:  - disease specific assessments  - medication review  - advance care planning  - goal progression  - education .

## 2025-07-03 ENCOUNTER — HOSPITAL ENCOUNTER (OUTPATIENT)
Dept: GENERAL RADIOLOGY | Age: 75
Discharge: HOME | End: 2025-07-03
Attending: OTOLARYNGOLOGY
Payer: MEDICARE

## 2025-07-03 DIAGNOSIS — R13.10 DYSPHAGIA, UNSPECIFIED TYPE: ICD-10-CM

## 2025-07-03 DIAGNOSIS — C09.9 CARCINOMA OF TONSIL (HCC): ICD-10-CM

## 2025-07-03 PROCEDURE — 2500000003 HC RX 250 WO HCPCS: Performed by: OTOLARYNGOLOGY

## 2025-07-03 PROCEDURE — 74220 X-RAY XM ESOPHAGUS 1CNTRST: CPT

## 2025-07-03 RX ADMIN — BARIUM SULFATE 340 G: 980 POWDER, FOR SUSPENSION ORAL at 14:23

## 2025-07-03 RX ADMIN — BARIUM SULFATE 1 TABLET: 700 TABLET ORAL at 14:23

## 2025-07-03 RX ADMIN — BARIUM SULFATE 176 G: 960 POWDER, FOR SUSPENSION ORAL at 14:23

## 2025-07-07 ENCOUNTER — RESULTS FOLLOW-UP (OUTPATIENT)
Dept: OTOLARYNGOLOGY | Age: 75
End: 2025-07-07

## 2025-07-09 ENCOUNTER — CARE COORDINATION (OUTPATIENT)
Dept: CARE COORDINATION | Age: 75
End: 2025-07-09

## 2025-07-09 NOTE — CARE COORDINATION
Ambulatory Care Coordination Note     7/9/2025 9:26 AM     Patient outreach attempt by this ACM today to perform care management follow up . ACM was unable to reach the patient by telephone today;    did not   attempt x 2      ACM: Tierra Breaux, RN     Care Summary Note:     PCP/Specialist follow up:   Future Appointments         Provider Specialty Dept Phone    7/16/2025 9:00 AM Chris Patel MD Otolaryngology [phone]    10/7/2025 11:45 AM Deepak Daley MD Nephrology [phone]            Follow Up:   Plan for next ACM outreach in approximately 1 week to complete:  - CC Protocol assessments  - disease specific assessments  - SDOH assessments  - medication review  - advance care planning  - goal progression  - education .

## 2025-07-15 RX ORDER — LEVOTHYROXINE SODIUM 25 UG/1
25 TABLET ORAL DAILY
Qty: 90 TABLET | Refills: 1 | Status: SHIPPED | OUTPATIENT
Start: 2025-07-15

## 2025-07-16 ENCOUNTER — CARE COORDINATION (OUTPATIENT)
Dept: CARE COORDINATION | Age: 75
End: 2025-07-16

## 2025-07-16 ENCOUNTER — OFFICE VISIT (OUTPATIENT)
Dept: ENT CLINIC | Age: 75
End: 2025-07-16
Payer: MEDICARE

## 2025-07-16 VITALS
BODY MASS INDEX: 27.22 KG/M2 | WEIGHT: 212 LBS | HEART RATE: 71 BPM | SYSTOLIC BLOOD PRESSURE: 114 MMHG | DIASTOLIC BLOOD PRESSURE: 70 MMHG

## 2025-07-16 DIAGNOSIS — R13.19 ESOPHAGEAL DYSPHAGIA: ICD-10-CM

## 2025-07-16 DIAGNOSIS — K22.4 ESOPHAGEAL DYSMOTILITY: ICD-10-CM

## 2025-07-16 DIAGNOSIS — C09.9 CARCINOMA OF TONSIL (HCC): Primary | ICD-10-CM

## 2025-07-16 DIAGNOSIS — K22.2 ESOPHAGEAL STENOSIS: ICD-10-CM

## 2025-07-16 PROCEDURE — G8427 DOCREV CUR MEDS BY ELIG CLIN: HCPCS | Performed by: OTOLARYNGOLOGY

## 2025-07-16 PROCEDURE — 99214 OFFICE O/P EST MOD 30 MIN: CPT | Performed by: OTOLARYNGOLOGY

## 2025-07-16 PROCEDURE — G8417 CALC BMI ABV UP PARAM F/U: HCPCS | Performed by: OTOLARYNGOLOGY

## 2025-07-16 PROCEDURE — 3074F SYST BP LT 130 MM HG: CPT | Performed by: OTOLARYNGOLOGY

## 2025-07-16 PROCEDURE — 3078F DIAST BP <80 MM HG: CPT | Performed by: OTOLARYNGOLOGY

## 2025-07-16 PROCEDURE — 3017F COLORECTAL CA SCREEN DOC REV: CPT | Performed by: OTOLARYNGOLOGY

## 2025-07-16 PROCEDURE — 1036F TOBACCO NON-USER: CPT | Performed by: OTOLARYNGOLOGY

## 2025-07-16 PROCEDURE — 1159F MED LIST DOCD IN RCRD: CPT | Performed by: OTOLARYNGOLOGY

## 2025-07-16 PROCEDURE — 1123F ACP DISCUSS/DSCN MKR DOCD: CPT | Performed by: OTOLARYNGOLOGY

## 2025-07-16 NOTE — CARE COORDINATION
Ambulatory Care Coordination Note     7/16/2025 11:16 AM     patient outreach attempt by this ACM today to perform care management follow up . ACM was unable to reach the patient by telephone today;   left voice message requesting a return phone call to this ACM. Attempt x 3     Patient closed (unable to reach patient) from the High Risk Care Management program on 7/16/2025.No further Ambulatory Care Manager follow up scheduled.

## 2025-07-16 NOTE — PROGRESS NOTES
Subjective:      Patient ID: Arpit Vinson is a 74 y.o. male.    HPI  Chief Complaint   Patient presents with    swallowing problems       History of Present Illness  Head/Neck    Arpit is a(n) 74 y.o. male who presents with a history of tonsil SCCa, HPV+, s/p chemo-radiation. Saw in February. ALF exam. Was having choking episodes when eating. Relieved by muscle relaxants.   Reviewed and interpreted esophagram. Does not have a GI doctor.     Patient Active Problem List   Diagnosis    Primary hypertension    Pure hypercholesterolemia    Viral hepatitis C    Tobacco dependence syndrome    Class 1 obesity due to excess calories with serious comorbidity and body mass index (BMI) of 30.0 to 30.9 in adult    Benign prostatic hyperplasia (BPH) with urinary urgency    Bruising    Atherosclerosis of native coronary artery of native heart    Alcoholic cirrhosis of liver without ascites (HCC)    Pain and swelling of lower extremity    Chronic pain of both knees    Left hand pain    Chronic gout of left foot    Weight gain    Hyperglycemia    Anemia, unspecified    Gum symptoms    Sprain of temporomandibular joint or ligament    Lymphadenopathy    Tonsillar mass    Neck mass    Alcohol abuse, daily use    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

## 2025-07-23 ENCOUNTER — TRANSCRIBE ORDERS (OUTPATIENT)
Dept: ADMINISTRATIVE | Age: 75
End: 2025-07-23

## 2025-07-23 DIAGNOSIS — M54.50 LOW BACK PAIN, UNSPECIFIED BACK PAIN LATERALITY, UNSPECIFIED CHRONICITY, UNSPECIFIED WHETHER SCIATICA PRESENT: ICD-10-CM

## 2025-07-23 DIAGNOSIS — M54.16 LUMBAR RADICULOPATHY: Primary | ICD-10-CM

## 2025-07-29 ENCOUNTER — HOSPITAL ENCOUNTER (OUTPATIENT)
Dept: MRI IMAGING | Age: 75
Discharge: HOME | End: 2025-07-29
Payer: MEDICARE

## 2025-07-29 DIAGNOSIS — M54.16 LUMBAR RADICULOPATHY: ICD-10-CM

## 2025-07-29 DIAGNOSIS — M54.50 LOW BACK PAIN, UNSPECIFIED BACK PAIN LATERALITY, UNSPECIFIED CHRONICITY, UNSPECIFIED WHETHER SCIATICA PRESENT: ICD-10-CM

## 2025-07-29 PROCEDURE — 72148 MRI LUMBAR SPINE W/O DYE: CPT

## 2025-09-02 DIAGNOSIS — E78.00 PURE HYPERCHOLESTEROLEMIA: ICD-10-CM

## 2025-09-02 RX ORDER — ATORVASTATIN CALCIUM 40 MG/1
40 TABLET, FILM COATED ORAL DAILY
Qty: 90 TABLET | Refills: 1 | Status: SHIPPED | OUTPATIENT
Start: 2025-09-02

## (undated) DEVICE — TRAP SPEC RETRV CLR PLAS POLYP IN LN SUCT QUIK CTCH

## (undated) DEVICE — SOLUTION IV 1000ML 0.9% SOD CHL

## (undated) DEVICE — GAUZE,SPONGE,4"X4",16PLY,XRAY,STRL,LF: Brand: MEDLINE

## (undated) DEVICE — 4-PORT MANIFOLD: Brand: NEPTUNE 2

## (undated) DEVICE — APPLICATOR PREP 26ML 0.7% IOD POVACRYLEX 74% ISO ALC ST

## (undated) DEVICE — SALEM SUMP DUAL LUMEN STOMACH TUBE MULTI-FUNCTIONAL PORT WITH ENFIT CONNECTION: Brand: SALEM SUMP

## (undated) DEVICE — GARMENT,MEDLINE,DVT,INT,CALF,MED, GEN2: Brand: MEDLINE

## (undated) DEVICE — SUTURE PROL SZ 2-0 L36IN NONABSORBABLE BLU SH L26MM 1/2 CIR 8523H

## (undated) DEVICE — DRAPE,LAP,CHOLE,W/TROUGHS,STERILE: Brand: MEDLINE

## (undated) DEVICE — NEEDLE,22GX1.5",REG,BEVEL: Brand: MEDLINE

## (undated) DEVICE — BLADE ES ELASTOMERIC COAT INSUL DURABLE BEND UPTO 90DEG

## (undated) DEVICE — COVER,LIGHT HANDLE,FLX,1/PK: Brand: MEDLINE INDUSTRIES, INC.

## (undated) DEVICE — SPONGE TONSIL 7/8IN MED DOUBLE STRUNG

## (undated) DEVICE — CATHETER,URETHRAL,VINYL,MALE,16",10 FR: Brand: MEDLINE

## (undated) DEVICE — NEPTUNE E-SEP SMOKE EVACUATION PENCIL, COATED, 70MM BLADE, PUSH BUTTON SWITCH: Brand: NEPTUNE E-SEP

## (undated) DEVICE — SUTURE MCRYL + SZ 4-0 L27IN ABSRB UD L19MM PS-2 3/8 CIR MCP426H

## (undated) DEVICE — CLEANER,CAUTERY TIP,2X2",STERILE: Brand: MEDLINE

## (undated) DEVICE — TOWEL,STOP FLAG GOLD N-W: Brand: MEDLINE

## (undated) DEVICE — PENCIL ES CRD L10FT HND SWCHING ROCK SWCH W/ EDGE COAT BLDE

## (undated) DEVICE — SUTURE VCRL + SZ 0 L27IN ABSRB WHT CT-2 1/2 CIR TAPERPOINT VCP270H

## (undated) DEVICE — YANKAUER,BULB TIP,W/O VENT,RIGID,STERILE: Brand: MEDLINE

## (undated) DEVICE — SYRINGE MED 10ML LUERLOCK TIP W/O SFTY DISP

## (undated) DEVICE — GENERAL: Brand: MEDLINE INDUSTRIES, INC.

## (undated) DEVICE — SUTURE VCRL + SZ 3-0 L27IN ABSRB UD L26MM SH 1/2 CIR VCP416H

## (undated) DEVICE — SHEET,T,THYROID,STERILE: Brand: MEDLINE

## (undated) DEVICE — GLOVE SURG SZ 7 L12IN FNGR THK75MIL WHT LTX POLYMER BEAD

## (undated) DEVICE — SUTURE PERMAHAND SZ 3-0 L18IN NONABSORBABLE BLK L26MM SH C013D

## (undated) DEVICE — PORT INSERTION: Brand: MEDLINE INDUSTRIES, INC.

## (undated) DEVICE — FORCEPS BX L240CM JAW DIA2.8MM L CAP W/ NDL MIC MESH TOOTH

## (undated) DEVICE — TOWEL,OR,DSP,ST,BLUE,STD,4/PK,20PK/CS: Brand: MEDLINE

## (undated) DEVICE — COAGULATOR SUCT 10FR L6IN HND FT SWCH VALLEYLAB

## (undated) DEVICE — SUTURE PDS + SZ 0 L27IN ABSRB VLT L26MM CT 2 1 2 CIR PDP334H

## (undated) DEVICE — SHEET,DRAPE,53X77,STERILE: Brand: MEDLINE

## (undated) DEVICE — SUTURE VICRYL + SZ 3-0 L27IN ABSRB UD L26MM SH 1/2 CIR VCP416H

## (undated) DEVICE — TUBING, SUCTION, 3/16" X 12', STRAIGHT: Brand: MEDLINE

## (undated) DEVICE — SNARE COLD DIAMOND 10MM THIN

## (undated) DEVICE — SUTURE ETHLN SZ 2-0 L18IN NONABSORBABLE BLK L26MM FS 3/8 664G

## (undated) DEVICE — LIQUIBAND RAPID ADHESIVE 36/CS 0.8ML: Brand: MEDLINE

## (undated) DEVICE — SOLUTION SURG PREP 26 CC PURPREP

## (undated) DEVICE — TRAP FLUID

## (undated) DEVICE — GLOVE,SURG,SENSICARE,ALOE,LF,PF,7: Brand: MEDLINE

## (undated) DEVICE — SOLUTION IV IRRIG 500ML 0.9% SODIUM CHL 2F7123

## (undated) DEVICE — ELECTRODE PT RET AD L9FT HI MOIST COND ADH HYDRGEL CORDED

## (undated) DEVICE — GLOVE SURG SZ 7 L12IN FNGR THK79MIL GRN LTX FREE

## (undated) DEVICE — Device